# Patient Record
Sex: FEMALE | Race: WHITE | NOT HISPANIC OR LATINO | Employment: OTHER | URBAN - METROPOLITAN AREA
[De-identification: names, ages, dates, MRNs, and addresses within clinical notes are randomized per-mention and may not be internally consistent; named-entity substitution may affect disease eponyms.]

---

## 2017-06-20 ENCOUNTER — HOSPITAL ENCOUNTER (OUTPATIENT)
Dept: MRI IMAGING | Facility: HOSPITAL | Age: 82
Discharge: HOME | End: 2017-06-20
Attending: NEUROLOGICAL SURGERY
Payer: COMMERCIAL

## 2017-06-20 DIAGNOSIS — D49.7 NEOPLASM OF UNSPECIFIED BEHAVIOR OF ENDOCRINE GLANDS AND OTHER PARTS OF NERVOUS SYSTEM: ICD-10-CM

## 2017-06-20 DIAGNOSIS — D32.1 BENIGN NEOPLASM OF SPINAL MENINGES (HCC): ICD-10-CM

## 2017-06-20 PROCEDURE — 72141 MRI NECK SPINE W/O DYE: CPT

## 2017-06-28 ENCOUNTER — TRANSCRIBE ORDERS (OUTPATIENT)
Dept: ADMINISTRATIVE | Facility: HOSPITAL | Age: 82
End: 2017-06-28

## 2017-06-28 ENCOUNTER — ALLSCRIPTS OFFICE VISIT (OUTPATIENT)
Dept: OTHER | Facility: OTHER | Age: 82
End: 2017-06-28

## 2017-06-28 DIAGNOSIS — E24.8 HYPERCORTISOLISM DUE TO ADRENAL NEOPLASM (HCC): Primary | ICD-10-CM

## 2017-06-28 DIAGNOSIS — D32.1 BENIGN NEOPLASM OF SPINAL MENINGES (HCC): ICD-10-CM

## 2017-06-28 DIAGNOSIS — D49.7 HYPERCORTISOLISM DUE TO ADRENAL NEOPLASM (HCC): Primary | ICD-10-CM

## 2017-11-27 ENCOUNTER — GENERIC CONVERSION - ENCOUNTER (OUTPATIENT)
Dept: OTHER | Facility: OTHER | Age: 82
End: 2017-11-27

## 2017-11-27 DIAGNOSIS — Z12.31 ENCOUNTER FOR SCREENING MAMMOGRAM FOR MALIGNANT NEOPLASM OF BREAST: ICD-10-CM

## 2018-01-12 NOTE — PROGRESS NOTES
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)    Plan    · (1) BUN; Status:Active; Requested VVV:[id number];    Perform:[facility]; IST:[id number]; Ordered; For:Intradural extramedullary spinal tumor, Spinal meningioma; Ordered By:Billy Patel;   · (1) CREATININE; Status:Active; Requested YUM:[id number];    Perform:[facility]; JAKE:[id number]; Ordered; For:Intradural extramedullary spinal tumor, Spinal meningioma; Ordered By:Billy Patel;   · * MRI CERVICAL SPINE W WO CONTRAST; Status:Voided;    Perform:[facility]; Order Comments:include down to T5, s/p meningioma resection; in 1 year; Due:28Jun2018; Ordered; For:Intradural extramedullary spinal tumor, Spinal meningioma; Ordered By:Billy Patel;   · Follow-up visit in 1 year Evaluation and Treatment  Follow-up  - at [facility] or as [facility] at  [facility]  Status: Hold For - Scheduling  Requested for: [id number]   Ordered; For: Intradural extramedullary spinal tumor, Spinal meningioma; Ordered By: Vani Denson Performed:  Due: [id number]   · * MRI CERVICAL SPINE WO CONTRAST; Status:Need Information - Financial  Authorization; Requested UVD:[id number];    Perform:[facility]; Order Comments:in 1 year; f/u for meningioma, include down to T5  Patient prefers non-contrast study ; Due:28Jun2018; Ordered; For:Intradural extramedullary spinal tumor, Spinal meningioma; Ordered By:Billy Patel;    Discussion/Summary    Patient doing very well 2 years s/p resection of T1 IDEM WHO 1 meningioma and decompression C5-7 for stenosis, fixation fusion C4-T3  EQ5D5L today 1 000, VAS 98  Had some swallowing difficulty postop, was worked up with CT Chest (negative for PE), V/Q scan (negative for PE), and barium swallow (reflux and profound esophageal dysmotility)  Was started on protonix, and her swallowing issues resolved  Following with her PCP, Dr Paulina Quiros, to manage this issue       Last year had just been diagnosed with left lower extremity DVT  This was after 4 day train trip  Was prescribed his Xeralto by Dr Lucian Silvestre, her PCP  Has since discontinued this medication  As above, neurologically doing very well  6 week and 6 month and 1 year postop Xrays show stability  MRI of CSpine at 1 year and 2 years (both done without contrast) does not show any recurrence (the risk of which I feel is very low)  I have suggested a repeat study be done in one year, which I have ordered  the patient requests this be done without contrast  I feel this is reasonable, if there is any thickening, effacement of the thecal sac, suggestion of recurrence, this should be visible on a noncontrast study, and the patient can be referred for a full contrast study as needed  She weaned out of the collar successfully  She is taking no medications for pain  She will call with any questions or concerns in the meantime  History of Present Illness  The patient is being seen for a routine clinic follow-up of a tumor of the spinal column  The tumor is located in the intradural-extramedullary space, at T 1  The tumor is a meningioma, WHO I  Past evaluation has included spine MRI  Past treatment has included laminectomy and resection  (7/9/15 [facility] assist DR: LAURA C4-T3 decompression/fixation/fusion for both stenosis and T1 WHO 1 meningioma) Symptoms:  occasional tightness in the neck, but no neck pain, no back pain, no upper extremity pain, no lower extremity pain, no localized weakness, no urinary incontinence, no bowel incontinence and no gait disturbance    The patient presents with complaints of sensory changes (" tight" feeling in her toes )  The patient presents with complaints of no urinary retention (does have urgency; no incomplete voiding, no nocturia, no hesitancy) The patient is not currently being treated for this problem        Review of Systems    Constitutional: No fever, no chills, feels well, no tiredness, no recent weight gain or weight loss  Eyes: Hx: right eye blind, but no eye pain, no dryness of the eyes, eyes not red, no purulent discharge from the eyes and no itching of the eyes   The patient presents with complaints of constant episodes of left eye visual disturbances, described as blurry vision  ENT: no complaints of earache, no loss of hearing, no nose bleeds, no nasal discharge, no sore throat, no hoarseness  Cardiovascular: No complaints of slow heart rate, no fast heart rate, no chest pain, no palpitations, no leg claudication, no lower extremity edema  Respiratory: No complaints of shortness of breath, no wheezing, no cough, no SOB on exertion, no orthopnea, no PND  Gastrointestinal: fair appetite, elastic tightness feeling in abdomen resolved, but No complaints of abdominal pain, no constipation, no nausea or vomiting, no diarrhea, no bloody stools  Genitourinary: Urinary urgency resolved, but No complaints of dysuria, no incontinence, no pelvic pain, no dysmenorrhea, no vaginal discharge or bleeding  Musculoskeletal: hx of LLE DVT, but No complaints of arthralgias, no myalgias, no joint swelling or stiffness, no limb pain or swelling  Integumentary: incision healing well, but No complaints of skin rash or lesions, no itching, no skin wounds, no breast pain or lump  Neurological: "tightness feeling in the toes", but No complaints of headache, no confusion, no convulsions, no numbness, no dizziness or fainting, no tingling, no limb weakness, no difficulty walking  Psychiatric: sleep disturbances and depression, but not suicidal, no anxiety, no personality change and no emotional problems  Endocrine: thyroidectomy multi nodular goiter, but No complaints of proptosis, no hot flashes, no muscle weakness, no deepening of the voice, no feelings of weakness     Hematologic/Lymphatic: anemia s/p surger, but No complaints of swollen glands, no swollen glands in the neck, does not bleed easily, does not bruise easily  Active Problems    1  Encounter for screening mammogram for breast cancer (V76 12) (Z12 31)   2  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)   3  Spinal cord tumor (239 7) (D49 7)   4  Spinal meningioma (225 4) (D32 1)    Past Medical History    1  History of Abnormal MRI, thoracic spine (793 7) (R93 7)   2  History of Coordination problem (781 3) (R27 9)   3  History of Gait difficulty (781 2) (R26 9)   4  History of anemia (V12 3) (Z86 2)   5  History of cataract (V12 49) (Z86 69)   6  History of endometriosis (V13 29) (Z87 42)   7  History of hearing loss (V12 49) (Z86 69)   8  History of thyroid disease (V12 29) (Z86 39)   9  History of Lower extremity numbness (782 0) (R20 0)   10  History of Poor balance (781 99) (R26 89)   11  History of Postoperative visit (V58 49) (Z48 89)   12  History of Wears glasses (V49 89) (Z97 3)    Surgical History    1  History of Appendectomy   2  History of Back Surgery   3  History of Breast Surgery Puncture Aspiration Of Cyst   4  History of Enteroscopic Polypectomy   5  History of Gallbladder Surgery   6  History of Hernia Repair   7  History of Leg Repair    Family History  Mother    1  Family history of   Father    2  Family history of   Maternal Grandmother    3  Family history of   Paternal Grandmother    3  Family history of   Maternal Grandfather    5  Family history of   Paternal Grandfather    10  Family history of     Social History    · Denied: History of Alcohol use   · Currently sexually active   · Denied: History of Drug use   · Four children   · Never a smoker   · No known STD risk factors   · Retired   ·     Current Meds   1  Protonix 40 MG Oral Tablet Delayed Release; TAKE 1 TABLET DAILY; Therapy: (Dennis Plasencia) to Recorded   2  Vitamin B-12 SOLN; injections occasional;   Therapy: (Recorded:2016) to Recorded    Allergies    1  Betadine MISC   2  Penicillins    Vitals  Vital Signs    Recorded: 28Jun2017 01:10PM   Temperature 98 1 F, Oral   Heart Rate 80   Respiration 16   Systolic 124, RUE, Sitting   Diastolic 66, RUE, Sitting   Height 5 ft 1 in   Weight 135 lb    BMI Calculated 25 51   BSA Calculated 1 6   Pain Scale 0     Physical Exam     Constitutional Patient appears healthy and well developed  No signs of acute distress present  Head and Face Normal on inspection  Neck posterior cervical incision well healed, minimal atrophy  Respiratory Respiratory effort: Normal    Neurologic - Mental Status: Alert and Oriented x3  Mood and affect: Affect is normal   Attention is WNL  Santos Merle Speech is articulated and fluent  Grossly nonfocal     Cranial Nerve Exam:  7th cranial nerve: Face symmetrical at grimace and at rest   Motor System General Motor Strength: No pronator drift and no parietal drift  Motor Strength:  strength was normal in both upper extremities  strength was normal in both lower extremities  Strength examination:  Reflexes: Biceps reflexes are 2+ bilaterally  Triceps reflexes are 2+ bilaterally  Achilles reflexes are 2+ bilaterally  Babinski's reflex is 2+ down going bilaterally  Ankle clonus is absent bilaterally  Diaz sign was not present:   Coordination: Involuntary movements: None   Gait and Station: Pillow with a normal gait  no aids  Results/Data  * MRI CERVICAL SPINE WO CONTRAST 20Jun2017 12:28PM Asif Pugh Order Number: [id number]   Performing Comments: s/p T1 meningioma resection; perform in one year  Patient prefers/requests noncontrast study  over down to T5    - Patient Instructions: To schedule this appointment, please contact Central Scheduling at [phone]  SCHEDULED [address] SIERRA 6/20/17 @@ 1:00PM  PLEASE ARRIVE 15 MINUTES EARLY  SG10     Test Name Result Flag Reference   MRI CERVICAL SPINE WO CONTRAST (Report)     This is a summary report   The complete report is available in the patient's medical record  If you cannot access the medical record, please contact the sending organization for a detailed fax or copy  MRI CERVICAL SPINE WITHOUT CONTRAST     INDICATION: D32 1: Benign neoplasm of spinal meninges   D49 7: Neoplasm of unspecified behavior of endocrine glands and other parts of nervous system   D49 7: Neoplasm of unspecified behavior of endocrine glands and other parts of nervous system  History taken directly from the electronic ordering system  COMPARISON: MRI performed on 6/24/2015, 6/23/2016  TECHNIQUE: Sagittal T1, sagittal T2, sagittal inversion recovery, axial T2, axial 2D merge        IMAGE QUALITY: Diagnostic     FINDINGS:     ALIGNMENT: Reversal of the normal cervical lordosis  Changes of dorsal decompression extending from C6 to T2 noted  Changes of posterior fusion noted extending from C4 to the level of T3  There is stable soft tissue thickening within the operative bed but unchanged from prior examination  No evidence    for an intra-dural mass is similar characteristics of a meningioma  MARROW SIGNAL: Marrow signal is within normal limits without signs of an infiltrative process  No signs of acute fracture or significant marrow edema pattern  Vertebral body heights are maintained  CERVICAL AND VISUALIZED THORACIC CORD: Normal signal within the visualized cord  PREVERTEBRAL AND PARASPINAL SOFT TISSUES: Enlarged left thyroid gland in keeping with multinodular goiter  VISUALIZED POSTERIOR FOSSA: The visualized posterior fossa demonstrates no abnormal signal      CERVICAL DISC SPACES:        C2-C3: No significant spinal canal stenosis  No right and no left neural foraminal stenosis  C3-C4: Disc osteophyte complex, uncovertebral hypertrophy, and facet arthropathy  Mild spinal canal stenosis  No cord deformity  Mild right and moderate left neural foraminal stenosis  C4-C5: Disc osteophyte complex, uncovertebral hypertrophy, and facet arthropathy   No significant spinal canal stenosis  Mild right and no significant left neural foraminal stenosis  C5-C6: Changes dorsal decompression  Disc osteophyte complex, uncovertebral hypertrophy, and facet arthropathy  Mild to moderate right and moderate left neural foraminal stenosis  C6-C7: Changes dorsal decompression  Bilateral facet arthropathy  Mild right and no significant left neural foraminal stenosis  C7-T1: Changes of dorsal decompression to No significant spinal canal stenosis  No right and no left neural foraminal stenosis  UPPER THORACIC DISC SPACES: No evidence for osseous canal or foraminal stenosis       IMPRESSION:   Stable MRI when compared to 6/23/2016  Sequela of remote decompression and posterior fusion  No clear evidence for recurrence  No cord signal abnormality  Stable spondylosis when compared to 6/23/2016         Workstation performed: [id number]     Signed by:   Quincy Bautista MD   6/21/17     Signatures   Electronically signed by : Reina Zimmer MD PhD; Jun 28 2017  1:46PM EST                       (Author)

## 2018-01-13 VITALS
SYSTOLIC BLOOD PRESSURE: 140 MMHG | WEIGHT: 135 LBS | BODY MASS INDEX: 25.49 KG/M2 | DIASTOLIC BLOOD PRESSURE: 66 MMHG | TEMPERATURE: 98.1 F | HEIGHT: 61 IN | HEART RATE: 80 BPM | RESPIRATION RATE: 16 BRPM

## 2018-01-16 NOTE — MISCELLANEOUS
Message   Recorded as Task   Date: 01/08/2016 04:28 PM, Created By: Jessee Valadez   Task Name: Miscellaneous   Assigned To: Ame Monson   Regarding Patient: Mikayla Agosto, Status: Active   CommentDarnelle Riddhi - 08 Jan 2016 4:28 PM     TASK CREATED  Cinthya Gambino is 6 mo s/p C4-T3 fixation fusion  Last seen 12/30 for f/u  Are there any contraindications to her having a massage  Terry Veras June - 11 Jan 2016 9:36 AM     TASK REPLIED TO: Previously Assigned To Cristobal Patel  presumably of cervical area? I would hold off on that until at least 6 weeks postop   Costenbader,Rondel - 12 Jan 2016 11:55 AM     TASK EDITED  Pt had surgery on 7/9/2015  Pt is past 6 weeks postop  Pt is 6 months postop and was informed she may have massage was advised to start out gently    Pt stated an understanding        Signatures   Electronically signed by : Ariana Wheeler, ; Jan 12 2016 11:55AM EST                       (Author)

## 2018-01-22 VITALS
WEIGHT: 134 LBS | BODY MASS INDEX: 25.3 KG/M2 | DIASTOLIC BLOOD PRESSURE: 64 MMHG | SYSTOLIC BLOOD PRESSURE: 104 MMHG | HEIGHT: 61 IN

## 2018-01-26 ENCOUNTER — TELEPHONE (OUTPATIENT)
Dept: NEUROSURGERY | Facility: CLINIC | Age: 83
End: 2018-01-26

## 2018-01-26 NOTE — TELEPHONE ENCOUNTER
As we discussed, no need for patient to come in early unless she has new neck pain, arm or leg weakness  Otherwise can follow-up as planned with studies as planned    Thanks

## 2018-01-26 NOTE — TELEPHONE ENCOUNTER
Called patient back  Patient reported she had a recent fall where she passed out in the shower  +LOC  911 called and patient sent to ED at [facility]  CT scan negative  Patient received fluids for dehydration and then sent home  ED doctor recommended for patient to call neurosurgery to provide update  Patient also developed shingles  About two and a half years ago, she is s/p T1 IDEM WHO grade I meningioma and decompression C5-7 for stenosis, fixation and fusion C4-T3  Patient is seen at [facility]  Her next appointment is 6/27/18 for a f/u with MRI C-spine w/o contrast      Should I move her appointment up with the MRI at [facility]? Patient does not report any pain or back issues  Thank you

## 2018-01-30 NOTE — TELEPHONE ENCOUNTER
Called the patient back to inquire if she has any new neck pain or arm/leg weakness  Patient denies any new neck pain or arm/leg weakness  Instructed patient to follow-up as originally planned  Patient was satisfied with the call

## 2018-06-22 ENCOUNTER — HOSPITAL ENCOUNTER (OUTPATIENT)
Dept: MRI IMAGING | Facility: HOSPITAL | Age: 83
Discharge: HOME | End: 2018-06-22
Attending: NEUROLOGICAL SURGERY
Payer: COMMERCIAL

## 2018-06-22 DIAGNOSIS — E24.8 HYPERCORTISOLISM DUE TO ADRENAL NEOPLASM (HCC): ICD-10-CM

## 2018-06-22 DIAGNOSIS — D49.7 HYPERCORTISOLISM DUE TO ADRENAL NEOPLASM (HCC): ICD-10-CM

## 2018-06-22 DIAGNOSIS — D32.1 BENIGN NEOPLASM OF SPINAL MENINGES (HCC): ICD-10-CM

## 2018-06-22 PROCEDURE — 72141 MRI NECK SPINE W/O DYE: CPT

## 2018-06-27 ENCOUNTER — OFFICE VISIT (OUTPATIENT)
Dept: NEUROSURGERY | Facility: CLINIC | Age: 83
End: 2018-06-27
Payer: COMMERCIAL

## 2018-06-27 VITALS
RESPIRATION RATE: 16 BRPM | HEART RATE: 69 BPM | DIASTOLIC BLOOD PRESSURE: 66 MMHG | WEIGHT: 122.2 LBS | TEMPERATURE: 97.9 F | SYSTOLIC BLOOD PRESSURE: 139 MMHG | HEIGHT: 61 IN | BODY MASS INDEX: 23.07 KG/M2

## 2018-06-27 DIAGNOSIS — D32.1 SPINAL MENINGIOMA (HCC): Primary | ICD-10-CM

## 2018-06-27 PROCEDURE — 99214 OFFICE O/P EST MOD 30 MIN: CPT | Performed by: NEUROLOGICAL SURGERY

## 2018-06-27 RX ORDER — PANTOPRAZOLE SODIUM 40 MG/1
40 TABLET, DELAYED RELEASE ORAL EVERY MORNING
COMMUNITY
End: 2020-06-24

## 2018-06-27 NOTE — PROGRESS NOTES
Neurosurgery Office Note  Armando Ocampo 81 y o  female MRN: [id number]      Assessment/Plan      Diagnoses and all orders for this visit:    Spinal meningioma [facility])  -     MRI cervical spine without contrast; Future    Other orders  -     pantoprazole (PROTONIX) 40 mg tablet; Take 40 mg by mouth daily          Discussion: This 81year old woman is now 0 3 years status post her C7-T2 laminectomies for resection of her spinal meningioma  Lesion was who grade 1  Required instrumentation from C4-T3, and had decompressions C5-C7 for stenosis  Is doing very well  Quite active  Metrics:  EQ5D5L 1 000, VA S 95, ECOG 0,  , ambulates without aids  Neurologically quite remarkable  Still Able travel to see her son in [place]  In fact, has plans to travel to [place] soon  Follow-up MRI scan of the cervical spine shows no recurrence, and good residual decompression  Alignment remains good  Incision well healed, with no skin thinning, protrusion of bone or hardware etc     She did have recent health issues, returning from a trip to [place], contracted the flu, and then shingles, and then a UTI, landing her in the hospital 3 times and relatively short spell  She has recovered for these well  I have suggested continued surveillance of her spinal meningioma  I suggest repeat MRI scan in 12 months, adapting NCCN guidelines cranial meningiomas  She is in agreement  I have ordered study  I will see her back in 1 year  CHIEF COMPLAINT    Chief Complaint   Patient presents with    Follow-up     1 year f/u with new MRI C-spine       HISTORY    History of Present Illness     81y o  year old female     HPI    See Discussion    REVIEW OF SYSTEMS    Review of Systems   Constitutional: Negative  HENT: Negative  Eyes:        Right eye blindness   Respiratory: Negative  Cardiovascular: Negative  Gastrointestinal: Positive for constipation (not new)  Endocrine: Negative      Genitourinary: Negative  Musculoskeletal: Positive for neck stiffness  Skin: Negative  Allergic/Immunologic: Negative  Neurological: Negative  Hematological: Negative  Psychiatric/Behavioral: Positive for sleep disturbance  Becoming more forgetful at times         Meds/Allergies     Current Outpatient Prescriptions   Medication Sig Dispense Refill    pantoprazole (PROTONIX) 40 mg tablet Take 40 mg by mouth daily       No current facility-administered medications for this visit  Allergies   Allergen Reactions    Penicillins        PAST HISTORY    Past Medical History:   Diagnosis Date    Intradural extramedullary spinal tumor     Spinal meningioma ([address] )        Past Surgical History:   Procedure Laterality Date    APPENDECTOMY      BACK SURGERY      Posterior cervical thoracic surgery with fusion    BREAST SURGERY      GALLBLADDER SURGERY      HERNIA REPAIR      POSTERIOR CERVICAL LAMINECTOMY      THYROID SURGERY      VARICOSE VEIN SURGERY         Social History   Substance Use Topics    Smoking status: Never Smoker    Smokeless tobacco: Never Used    Alcohol use Yes      Comment: occasional       Family History   Problem Relation Age of Onset    Cancer Mother     Alzheimer's disease Mother     Heart disease Father     Heart disease Brother          Above history personally reviewed  EXAM    Vitals:Blood pressure 139/66, pulse 69, temperature 97 9 °F (36 6 °C), temperature source Tympanic, resp  rate 16, height 5' 1" (1 549 m), weight 55 4 kg (122 lb 3 2 oz)  ,Body mass index is 23 09 kg/m²  Physical Exam   Constitutional: She is oriented to person, place, and time  She appears well-developed and well-nourished  HENT:   Head: Normocephalic  Eyes: No scleral icterus  Neck: Neck supple  Cardiovascular: Normal rate  Pulmonary/Chest: Effort normal    Abdominal: Soft  Neurological: She is alert and oriented to person, place, and time  She has normal strength   GCS eye subscore is 4  GCS verbal subscore is 5  GCS motor subscore is 6  Skin: Skin is warm and dry  Psychiatric: She has a normal mood and affect  Her speech is normal and behavior is normal        Neurologic Exam     Mental Status   Oriented to person, place, and time  Attention: normal    Speech: speech is normal   Level of consciousness: alert    Cranial Nerves     CN VII   Facial expression full, symmetric  Motor Exam   Muscle bulk: normal  Overall muscle tone: normal    Strength   Strength 5/5 throughout  Moves all extremities, grossly normal     Gait, Coordination, and Reflexes     Tremor   Resting tremor: absent  Intention tremor: absent  Action tremor: absent  No aids  MEDICAL DECISION MAKING    Imaging Studies:     Mri Cervical Spine Wo Contrast    Result Date: 6/25/2018  Narrative: MRI CERVICAL SPINE WITHOUT CONTRAST INDICATION: D32 1: Benign neoplasm of spinal meninges  Follow-up tumor resection  COMPARISON:  6/20/2017 TECHNIQUE:  Sagittal T1, sagittal T2, sagittal inversion recovery, axial T2, axial  2D merge IMAGE QUALITY:  Diagnostic FINDINGS: ALIGNMENT:  Stable alignment of the cervical spine  No subluxation  No compression fracture or scoliosis  Patient has undergone previous posterior decompression and fusion  Posterior decompression in the lower cervical and upper thoracic spine with posterior fusion including hardware from C4 into the upper thoracic spine with articulating screws in the cervical spine and pedicle screws in the thoracic spine  Bilateral spinal rods  MARROW SIGNAL:  Normal marrow signal is identified within the visualized bony structures  No discrete marrow lesion  CERVICAL AND VISUALIZED THORACIC CORD:  Normal signal within the visualized cord  PREVERTEBRAL AND PARASPINAL SOFT TISSUES:  The left lobe of the thyroid gland is diffusely heterogeneous and enlarged, similar to the prior examination    The largest nodule is posteriorly located measuring greater than 2 cm similar to the prior exam   These changes result in mild mass effect upon the trachea  VISUALIZED POSTERIOR FOSSA:  The visualized posterior fossa demonstrates no abnormal signal  CERVICAL DISC SPACES: C2-C3:  Normal  C3-C4:  Mild annular bulging  Mild posterior element hypertrophic change, left greater than right  Mild canal stenosis without cord compression  Only slight foraminal narrowing  C4-C5:  Loss of disc height  Slight endplate hypertrophic change  No canal stenosis or foraminal narrowing  C5-C6:  Loss of disc height  Minimal endplate hypertrophic change without canal stenosis or significant foraminal narrowing  C6-C7:  Normal disc height and signal   Adequate posterior decompression  No canal stenosis or foraminal narrowing  C7-T1:  Normal disc height and signal with adequate posterior decompression and no canal stenosis or foraminal narrowing  UPPER THORACIC DISC SPACES:  Adequate posterior decompression within the upper thoracic spine  No disc herniation, canal stenosis or foraminal narrowing  Impression: Stable examination of the cervical spine  Mild canal stenosis at the C3-4 level secondary to mild annular bulging and slight posterior element hypertrophic change  No cord compression in only slight foraminal narrowing  Within the lower cervical and upper thoracic spine there is adequate posterior decompression without canal stenosis or foraminal narrowing  No evidence of residual or recurrent mass on this noncontrast examination  Incidental thyroid nodule(s) for which nonemergent thyroid ultrasound is recommended  Workstation performed: [id number]       I have personally reviewed pertinent reports     and I have personally reviewed pertinent films in PACS

## 2019-04-30 ENCOUNTER — OFFICE VISIT (OUTPATIENT)
Dept: OBGYN CLINIC | Facility: CLINIC | Age: 84
End: 2019-04-30
Payer: COMMERCIAL

## 2019-04-30 VITALS
WEIGHT: 135 LBS | HEIGHT: 61 IN | SYSTOLIC BLOOD PRESSURE: 128 MMHG | BODY MASS INDEX: 25.49 KG/M2 | DIASTOLIC BLOOD PRESSURE: 66 MMHG

## 2019-04-30 DIAGNOSIS — R14.0 BLOATING: Primary | ICD-10-CM

## 2019-04-30 DIAGNOSIS — Z12.39 SCREENING FOR MALIGNANT NEOPLASM OF BREAST: ICD-10-CM

## 2019-04-30 PROCEDURE — 99213 OFFICE O/P EST LOW 20 MIN: CPT | Performed by: OBSTETRICS & GYNECOLOGY

## 2019-05-09 ENCOUNTER — TRANSCRIBE ORDERS (OUTPATIENT)
Dept: LAB | Facility: CLINIC | Age: 84
End: 2019-05-09

## 2019-05-09 ENCOUNTER — APPOINTMENT (OUTPATIENT)
Dept: LAB | Facility: CLINIC | Age: 84
End: 2019-05-09
Payer: COMMERCIAL

## 2019-05-09 ENCOUNTER — HOSPITAL ENCOUNTER (OUTPATIENT)
Dept: ULTRASOUND IMAGING | Facility: HOSPITAL | Age: 84
Discharge: HOME | End: 2019-05-09
Attending: OBSTETRICS & GYNECOLOGY
Payer: COMMERCIAL

## 2019-05-09 DIAGNOSIS — R14.0 BLOATING: ICD-10-CM

## 2019-05-09 DIAGNOSIS — E03.9 HYPOTHYROIDISM, ACQUIRED: ICD-10-CM

## 2019-05-09 DIAGNOSIS — N39.0 URINARY TRACT INFECTION WITHOUT HEMATURIA, SITE UNSPECIFIED: ICD-10-CM

## 2019-05-09 DIAGNOSIS — E78.00 PURE HYPERCHOLESTEROLEMIA: ICD-10-CM

## 2019-05-09 DIAGNOSIS — D64.9 ANEMIA, UNSPECIFIED TYPE: ICD-10-CM

## 2019-05-09 DIAGNOSIS — N39.0 URINARY TRACT INFECTION WITHOUT HEMATURIA, SITE UNSPECIFIED: Primary | ICD-10-CM

## 2019-05-09 LAB
ALBUMIN SERPL BCP-MCNC: 3.6 G/DL (ref 3.5–5)
ALP SERPL-CCNC: 106 U/L (ref 46–116)
ALT SERPL W P-5'-P-CCNC: 21 U/L (ref 12–78)
ANION GAP SERPL CALCULATED.3IONS-SCNC: 11 MMOL/L (ref 4–13)
AST SERPL W P-5'-P-CCNC: 25 U/L (ref 5–45)
BACTERIA UR QL AUTO: ABNORMAL /HPF
BILIRUB SERPL-MCNC: 0.7 MG/DL (ref 0.2–1)
BILIRUB UR QL STRIP: NEGATIVE
BUN SERPL-MCNC: 20 MG/DL (ref 5–25)
CALCIUM SERPL-MCNC: 8.9 MG/DL (ref 8.3–10.1)
CHLORIDE SERPL-SCNC: 103 MMOL/L (ref 100–108)
CHOLEST SERPL-MCNC: 246 MG/DL (ref 50–200)
CLARITY UR: CLEAR
CO2 SERPL-SCNC: 26 MMOL/L (ref 21–32)
COLOR UR: YELLOW
CREAT SERPL-MCNC: 0.99 MG/DL (ref 0.6–1.3)
ERYTHROCYTE [DISTWIDTH] IN BLOOD BY AUTOMATED COUNT: 13.4 % (ref 11.6–15.1)
GFR SERPL CREATININE-BSD FRML MDRD: 53 ML/MIN/1.73SQ M
GLUCOSE P FAST SERPL-MCNC: 96 MG/DL (ref 65–99)
GLUCOSE UR STRIP-MCNC: NEGATIVE MG/DL
HCT VFR BLD AUTO: 41.1 % (ref 34.8–46.1)
HDLC SERPL-MCNC: 85 MG/DL (ref 40–60)
HGB BLD-MCNC: 13.7 G/DL (ref 11.5–15.4)
HGB UR QL STRIP.AUTO: ABNORMAL
KETONES UR STRIP-MCNC: NEGATIVE MG/DL
LDLC SERPL CALC-MCNC: 147 MG/DL (ref 0–100)
LDLC SERPL DIRECT ASSAY-MCNC: 151 MG/DL (ref 0–100)
LEUKOCYTE ESTERASE UR QL STRIP: NEGATIVE
MCH RBC QN AUTO: 29.5 PG (ref 26.8–34.3)
MCHC RBC AUTO-ENTMCNC: 33.3 G/DL (ref 31.4–37.4)
MCV RBC AUTO: 89 FL (ref 82–98)
NITRITE UR QL STRIP: NEGATIVE
NON-SQ EPI CELLS URNS QL MICRO: ABNORMAL /HPF
NONHDLC SERPL-MCNC: 161 MG/DL
PH UR STRIP.AUTO: 6.5 [PH]
PLATELET # BLD AUTO: 252 THOUSANDS/UL (ref 149–390)
PMV BLD AUTO: 10.9 FL (ref 8.9–12.7)
POTASSIUM SERPL-SCNC: 4.6 MMOL/L (ref 3.5–5.3)
PROT SERPL-MCNC: 7.5 G/DL (ref 6.4–8.2)
PROT UR STRIP-MCNC: NEGATIVE MG/DL
RBC # BLD AUTO: 4.64 MILLION/UL (ref 3.81–5.12)
RBC #/AREA URNS AUTO: ABNORMAL /HPF
SODIUM SERPL-SCNC: 140 MMOL/L (ref 136–145)
SP GR UR STRIP.AUTO: 1.01 (ref 1–1.03)
TRIGL SERPL-MCNC: 68 MG/DL
TSH SERPL DL<=0.05 MIU/L-ACNC: 1.78 UIU/ML (ref 0.36–3.74)
UROBILINOGEN UR QL STRIP.AUTO: 1 E.U./DL
WBC # BLD AUTO: 5.19 THOUSAND/UL (ref 4.31–10.16)
WBC #/AREA URNS AUTO: ABNORMAL /HPF

## 2019-05-09 PROCEDURE — 76830 TRANSVAGINAL US NON-OB: CPT

## 2019-05-09 PROCEDURE — 83721 ASSAY OF BLOOD LIPOPROTEIN: CPT

## 2019-05-09 PROCEDURE — 76700 US EXAM ABDOM COMPLETE: CPT

## 2019-05-09 PROCEDURE — 80061 LIPID PANEL: CPT

## 2019-05-09 PROCEDURE — 81001 URINALYSIS AUTO W/SCOPE: CPT | Performed by: INTERNAL MEDICINE

## 2019-05-09 PROCEDURE — 80053 COMPREHEN METABOLIC PANEL: CPT

## 2019-05-09 PROCEDURE — 36415 COLL VENOUS BLD VENIPUNCTURE: CPT

## 2019-05-09 PROCEDURE — 85027 COMPLETE CBC AUTOMATED: CPT

## 2019-05-09 PROCEDURE — 84443 ASSAY THYROID STIM HORMONE: CPT

## 2019-05-09 PROCEDURE — 76856 US EXAM PELVIC COMPLETE: CPT

## 2019-06-21 ENCOUNTER — HOSPITAL ENCOUNTER (OUTPATIENT)
Dept: MRI IMAGING | Facility: HOSPITAL | Age: 84
Discharge: HOME | End: 2019-06-21
Attending: NEUROLOGICAL SURGERY
Payer: COMMERCIAL

## 2019-06-21 DIAGNOSIS — D32.1 SPINAL MENINGIOMA (HCC): ICD-10-CM

## 2019-06-21 PROCEDURE — 72141 MRI NECK SPINE W/O DYE: CPT

## 2019-06-23 ENCOUNTER — ANESTHESIA EVENT (OUTPATIENT)
Dept: PERIOP | Facility: AMBULARY SURGERY CENTER | Age: 84
End: 2019-06-23
Payer: COMMERCIAL

## 2019-06-24 ENCOUNTER — ANESTHESIA (OUTPATIENT)
Dept: PERIOP | Facility: AMBULARY SURGERY CENTER | Age: 84
End: 2019-06-24
Payer: COMMERCIAL

## 2019-06-24 ENCOUNTER — HOSPITAL ENCOUNTER (OUTPATIENT)
Facility: AMBULARY SURGERY CENTER | Age: 84
Setting detail: OUTPATIENT SURGERY
Discharge: HOME | End: 2019-06-24
Attending: OPHTHALMOLOGY | Admitting: OPHTHALMOLOGY
Payer: COMMERCIAL

## 2019-06-24 VITALS
SYSTOLIC BLOOD PRESSURE: 175 MMHG | OXYGEN SATURATION: 97 % | RESPIRATION RATE: 16 BRPM | TEMPERATURE: 98.5 F | DIASTOLIC BLOOD PRESSURE: 75 MMHG | BODY MASS INDEX: 25.3 KG/M2 | HEART RATE: 87 BPM | WEIGHT: 134 LBS | HEIGHT: 61 IN

## 2019-06-24 DIAGNOSIS — H25.12 AGE-RELATED NUCLEAR CATARACT OF LEFT EYE: Primary | ICD-10-CM

## 2019-06-24 PROCEDURE — V2632 POST CHMBR INTRAOCULAR LENS: HCPCS | Performed by: OPHTHALMOLOGY

## 2019-06-24 DEVICE — IOL SN60WF 14.5: Type: IMPLANTABLE DEVICE | Site: EYE | Status: FUNCTIONAL

## 2019-06-24 RX ORDER — TETRACAINE HYDROCHLORIDE 5 MG/ML
1 SOLUTION OPHTHALMIC ONCE
Status: COMPLETED | OUTPATIENT
Start: 2019-06-24 | End: 2019-06-24

## 2019-06-24 RX ORDER — CYCLOPENTOLATE HYDROCHLORIDE 10 MG/ML
1 SOLUTION/ DROPS OPHTHALMIC
Status: COMPLETED | OUTPATIENT
Start: 2019-06-24 | End: 2019-06-24

## 2019-06-24 RX ORDER — GATIFLOXACIN 5 MG/ML
SOLUTION/ DROPS OPHTHALMIC AS NEEDED
Status: DISCONTINUED | OUTPATIENT
Start: 2019-06-24 | End: 2019-06-24 | Stop reason: HOSPADM

## 2019-06-24 RX ORDER — LIDOCAINE HYDROCHLORIDE 10 MG/ML
INJECTION, SOLUTION EPIDURAL; INFILTRATION; INTRACAUDAL; PERINEURAL AS NEEDED
Status: DISCONTINUED | OUTPATIENT
Start: 2019-06-24 | End: 2019-06-24 | Stop reason: HOSPADM

## 2019-06-24 RX ORDER — BALANCED SALT SOLUTION 6.4; .75; .48; .3; 3.9; 1.7 MG/ML; MG/ML; MG/ML; MG/ML; MG/ML; MG/ML
SOLUTION OPHTHALMIC AS NEEDED
Status: DISCONTINUED | OUTPATIENT
Start: 2019-06-24 | End: 2019-06-24 | Stop reason: HOSPADM

## 2019-06-24 RX ORDER — LIDOCAINE HYDROCHLORIDE 20 MG/ML
1 JELLY TOPICAL
Status: COMPLETED | OUTPATIENT
Start: 2019-06-24 | End: 2019-06-24

## 2019-06-24 RX ORDER — MIDAZOLAM HYDROCHLORIDE 1 MG/ML
INJECTION INTRAMUSCULAR; INTRAVENOUS AS NEEDED
Status: DISCONTINUED | OUTPATIENT
Start: 2019-06-24 | End: 2019-06-24 | Stop reason: SURG

## 2019-06-24 RX ORDER — KETOROLAC TROMETHAMINE 5 MG/ML
1 SOLUTION OPHTHALMIC
Status: COMPLETED | OUTPATIENT
Start: 2019-06-24 | End: 2019-06-24

## 2019-06-24 RX ORDER — PHENYLEPHRINE HCL 2.5 %
1 DROPS OPHTHALMIC (EYE)
Status: COMPLETED | OUTPATIENT
Start: 2019-06-24 | End: 2019-06-24

## 2019-06-24 RX ORDER — GATIFLOXACIN 5 MG/ML
1 SOLUTION/ DROPS OPHTHALMIC 2 TIMES DAILY
Qty: 3 ML | Refills: 0
Start: 2019-06-24 | End: 2020-06-24

## 2019-06-24 RX ORDER — TETRACAINE HYDROCHLORIDE 5 MG/ML
SOLUTION OPHTHALMIC AS NEEDED
Status: DISCONTINUED | OUTPATIENT
Start: 2019-06-24 | End: 2019-06-24 | Stop reason: HOSPADM

## 2019-06-24 RX ADMIN — KETOROLAC TROMETHAMINE 1 DROP: 5 SOLUTION/ DROPS OPHTHALMIC at 09:56

## 2019-06-24 RX ADMIN — PHENYLEPHRINE HYDROCHLORIDE 1 DROP: 25 SOLUTION/ DROPS OPHTHALMIC at 09:10

## 2019-06-24 RX ADMIN — PHENYLEPHRINE HYDROCHLORIDE 1 DROP: 25 SOLUTION/ DROPS OPHTHALMIC at 09:25

## 2019-06-24 RX ADMIN — CYCLOPENTOLATE HYDROCHLORIDE 1 DROP: 10 SOLUTION OPHTHALMIC at 09:56

## 2019-06-24 RX ADMIN — CYCLOPENTOLATE HYDROCHLORIDE 1 DROP: 10 SOLUTION OPHTHALMIC at 09:25

## 2019-06-24 RX ADMIN — KETOROLAC TROMETHAMINE 1 DROP: 5 SOLUTION/ DROPS OPHTHALMIC at 09:10

## 2019-06-24 RX ADMIN — LIDOCAINE HYDROCHLORIDE 5 APPLICATION: 20 JELLY TOPICAL at 09:56

## 2019-06-24 RX ADMIN — TETRACAINE HYDROCHLORIDE 1 DROP: 5 SOLUTION OPHTHALMIC at 09:10

## 2019-06-24 RX ADMIN — PHENYLEPHRINE HYDROCHLORIDE 1 DROP: 25 SOLUTION/ DROPS OPHTHALMIC at 09:56

## 2019-06-24 RX ADMIN — LIDOCAINE HYDROCHLORIDE 5 APPLICATION: 20 JELLY TOPICAL at 09:41

## 2019-06-24 RX ADMIN — LIDOCAINE HYDROCHLORIDE 5 APPLICATION: 20 JELLY TOPICAL at 09:10

## 2019-06-24 RX ADMIN — PHENYLEPHRINE HYDROCHLORIDE 1 DROP: 25 SOLUTION/ DROPS OPHTHALMIC at 09:41

## 2019-06-24 RX ADMIN — KETOROLAC TROMETHAMINE 1 DROP: 5 SOLUTION/ DROPS OPHTHALMIC at 09:40

## 2019-06-24 RX ADMIN — MIDAZOLAM HYDROCHLORIDE 2 MG: 1 INJECTION, SOLUTION INTRAMUSCULAR; INTRAVENOUS at 10:08

## 2019-06-24 RX ADMIN — CYCLOPENTOLATE HYDROCHLORIDE 1 DROP: 10 SOLUTION OPHTHALMIC at 09:10

## 2019-06-24 RX ADMIN — LIDOCAINE HYDROCHLORIDE 5 APPLICATION: 20 JELLY TOPICAL at 09:25

## 2019-06-24 RX ADMIN — CYCLOPENTOLATE HYDROCHLORIDE 1 DROP: 10 SOLUTION OPHTHALMIC at 09:40

## 2019-06-24 RX ADMIN — KETOROLAC TROMETHAMINE 1 DROP: 5 SOLUTION/ DROPS OPHTHALMIC at 09:25

## 2019-06-26 ENCOUNTER — OFFICE VISIT (OUTPATIENT)
Dept: NEUROSURGERY | Facility: CLINIC | Age: 84
End: 2019-06-26
Payer: COMMERCIAL

## 2019-06-26 VITALS
BODY MASS INDEX: 25.37 KG/M2 | RESPIRATION RATE: 16 BRPM | TEMPERATURE: 98.6 F | HEIGHT: 61 IN | HEART RATE: 84 BPM | SYSTOLIC BLOOD PRESSURE: 133 MMHG | DIASTOLIC BLOOD PRESSURE: 95 MMHG | WEIGHT: 134.4 LBS

## 2019-06-26 DIAGNOSIS — D32.1 SPINAL MENINGIOMA (HCC): Primary | ICD-10-CM

## 2019-06-26 DIAGNOSIS — Z98.1 S/P SPINAL FUSION: ICD-10-CM

## 2019-06-26 DIAGNOSIS — IMO0002 ADJACENT SEGMENT DISEASE WITH SPINAL STENOSIS: ICD-10-CM

## 2019-06-26 PROCEDURE — 99214 OFFICE O/P EST MOD 30 MIN: CPT | Performed by: NEUROLOGICAL SURGERY

## 2019-06-26 RX ORDER — PREDNISOLONE ACETATE 10 MG/ML
1 SUSPENSION/ DROPS OPHTHALMIC 4 TIMES DAILY
COMMUNITY
Start: 2019-06-16 | End: 2020-06-24

## 2019-10-07 ENCOUNTER — HOSPITAL ENCOUNTER (OUTPATIENT)
Dept: MAMMOGRAPHY | Facility: HOSPITAL | Age: 84
Discharge: HOME | End: 2019-10-07
Attending: OBSTETRICS & GYNECOLOGY
Payer: COMMERCIAL

## 2019-10-07 VITALS — HEIGHT: 61 IN | WEIGHT: 134 LBS | BODY MASS INDEX: 25.3 KG/M2

## 2019-10-07 DIAGNOSIS — Z12.39 SCREENING FOR MALIGNANT NEOPLASM OF BREAST: ICD-10-CM

## 2019-10-07 PROCEDURE — 77067 SCR MAMMO BI INCL CAD: CPT

## 2019-11-14 ENCOUNTER — APPOINTMENT (OUTPATIENT)
Dept: LAB | Facility: CLINIC | Age: 84
End: 2019-11-14
Payer: COMMERCIAL

## 2019-11-14 ENCOUNTER — TRANSCRIBE ORDERS (OUTPATIENT)
Dept: LAB | Facility: CLINIC | Age: 84
End: 2019-11-14

## 2019-11-14 DIAGNOSIS — E78.2 MIXED HYPERLIPIDEMIA: Primary | ICD-10-CM

## 2019-11-14 DIAGNOSIS — E78.2 MIXED HYPERLIPIDEMIA: ICD-10-CM

## 2019-11-14 DIAGNOSIS — E13.10 TYPE II OR UNSPECIFIED TYPE DIABETES MELLITUS WITH KETOACIDOSIS, UNCONTROLLED(250.12) (HCC): Primary | ICD-10-CM

## 2019-11-14 DIAGNOSIS — E13.10 TYPE II OR UNSPECIFIED TYPE DIABETES MELLITUS WITH KETOACIDOSIS, UNCONTROLLED(250.12) (HCC): ICD-10-CM

## 2019-11-14 LAB
ALBUMIN SERPL BCP-MCNC: 3.6 G/DL (ref 3.5–5)
ALP SERPL-CCNC: 107 U/L (ref 46–116)
ALT SERPL W P-5'-P-CCNC: 21 U/L (ref 12–78)
ANION GAP SERPL CALCULATED.3IONS-SCNC: 10 MMOL/L (ref 4–13)
AST SERPL W P-5'-P-CCNC: 20 U/L (ref 5–45)
BILIRUB SERPL-MCNC: 0.7 MG/DL (ref 0.2–1)
BUN SERPL-MCNC: 15 MG/DL (ref 5–25)
CALCIUM SERPL-MCNC: 8.7 MG/DL (ref 8.3–10.1)
CHLORIDE SERPL-SCNC: 105 MMOL/L (ref 100–108)
CHOLEST SERPL-MCNC: 239 MG/DL (ref 50–200)
CO2 SERPL-SCNC: 27 MMOL/L (ref 21–32)
CREAT SERPL-MCNC: 0.88 MG/DL (ref 0.6–1.3)
ERYTHROCYTE [DISTWIDTH] IN BLOOD BY AUTOMATED COUNT: 13.9 % (ref 11.6–15.1)
GFR SERPL CREATININE-BSD FRML MDRD: 61 ML/MIN/1.73SQ M
GLUCOSE P FAST SERPL-MCNC: 103 MG/DL (ref 65–99)
HCT VFR BLD AUTO: 41.1 % (ref 34.8–46.1)
HDLC SERPL-MCNC: 77 MG/DL
HGB BLD-MCNC: 13.3 G/DL (ref 11.5–15.4)
LDLC SERPL CALC-MCNC: 149 MG/DL (ref 0–100)
MCH RBC QN AUTO: 29 PG (ref 26.8–34.3)
MCHC RBC AUTO-ENTMCNC: 32.4 G/DL (ref 31.4–37.4)
MCV RBC AUTO: 90 FL (ref 82–98)
NONHDLC SERPL-MCNC: 162 MG/DL
PLATELET # BLD AUTO: 243 THOUSANDS/UL (ref 149–390)
PMV BLD AUTO: 10.9 FL (ref 8.9–12.7)
POTASSIUM SERPL-SCNC: 3.8 MMOL/L (ref 3.5–5.3)
PROT SERPL-MCNC: 7.6 G/DL (ref 6.4–8.2)
RBC # BLD AUTO: 4.59 MILLION/UL (ref 3.81–5.12)
SODIUM SERPL-SCNC: 142 MMOL/L (ref 136–145)
TRIGL SERPL-MCNC: 64 MG/DL
WBC # BLD AUTO: 5.26 THOUSAND/UL (ref 4.31–10.16)

## 2019-11-14 PROCEDURE — 85027 COMPLETE CBC AUTOMATED: CPT

## 2019-11-14 PROCEDURE — 36415 COLL VENOUS BLD VENIPUNCTURE: CPT

## 2019-11-14 PROCEDURE — 80053 COMPREHEN METABOLIC PANEL: CPT

## 2019-11-14 PROCEDURE — 80061 LIPID PANEL: CPT

## 2020-06-17 ENCOUNTER — HOSPITAL ENCOUNTER (OUTPATIENT)
Dept: MRI IMAGING | Facility: HOSPITAL | Age: 85
Discharge: HOME | End: 2020-06-17
Attending: NEUROLOGICAL SURGERY
Payer: COMMERCIAL

## 2020-06-17 DIAGNOSIS — D32.1 SPINAL MENINGIOMA (HCC): ICD-10-CM

## 2020-06-17 DIAGNOSIS — Z98.1 S/P SPINAL FUSION: ICD-10-CM

## 2020-06-17 DIAGNOSIS — IMO0002 ADJACENT SEGMENT DISEASE WITH SPINAL STENOSIS: ICD-10-CM

## 2020-06-17 PROCEDURE — 72141 MRI NECK SPINE W/O DYE: CPT

## 2020-06-23 ENCOUNTER — TELEPHONE (OUTPATIENT)
Dept: NEUROSURGERY | Facility: CLINIC | Age: 85
End: 2020-06-23

## 2020-06-24 ENCOUNTER — OFFICE VISIT (OUTPATIENT)
Dept: NEUROSURGERY | Facility: CLINIC | Age: 85
End: 2020-06-24
Payer: COMMERCIAL

## 2020-06-24 VITALS
HEART RATE: 66 BPM | RESPIRATION RATE: 16 BRPM | WEIGHT: 137.8 LBS | DIASTOLIC BLOOD PRESSURE: 85 MMHG | BODY MASS INDEX: 26.01 KG/M2 | SYSTOLIC BLOOD PRESSURE: 151 MMHG | TEMPERATURE: 97.4 F | HEIGHT: 61 IN

## 2020-06-24 DIAGNOSIS — D32.1 SPINAL MENINGIOMA (HCC): Primary | ICD-10-CM

## 2020-06-24 DIAGNOSIS — Z98.1 S/P SPINAL FUSION: ICD-10-CM

## 2020-06-24 DIAGNOSIS — IMO0002 ADJACENT SEGMENT DISEASE WITH SPINAL STENOSIS: ICD-10-CM

## 2020-06-24 PROCEDURE — 99214 OFFICE O/P EST MOD 30 MIN: CPT | Performed by: NEUROLOGICAL SURGERY

## 2020-06-24 RX ORDER — PANTOPRAZOLE SODIUM 40 MG/1
1 TABLET, DELAYED RELEASE ORAL DAILY
COMMUNITY
Start: 2020-05-10

## 2021-02-09 DIAGNOSIS — Z23 ENCOUNTER FOR IMMUNIZATION: ICD-10-CM

## 2021-02-11 ENCOUNTER — IMMUNIZATIONS (OUTPATIENT)
Dept: FAMILY MEDICINE CLINIC | Facility: HOSPITAL | Age: 86
End: 2021-02-11

## 2021-02-11 DIAGNOSIS — Z23 ENCOUNTER FOR IMMUNIZATION: Primary | ICD-10-CM

## 2021-02-11 PROCEDURE — 0001A SARS-COV-2 / COVID-19 MRNA VACCINE (PFIZER-BIONTECH) 30 MCG: CPT

## 2021-02-11 PROCEDURE — 91300 SARS-COV-2 / COVID-19 MRNA VACCINE (PFIZER-BIONTECH) 30 MCG: CPT

## 2021-03-02 ENCOUNTER — IMMUNIZATIONS (OUTPATIENT)
Dept: FAMILY MEDICINE CLINIC | Facility: HOSPITAL | Age: 86
End: 2021-03-02

## 2021-03-02 DIAGNOSIS — Z23 ENCOUNTER FOR IMMUNIZATION: Primary | ICD-10-CM

## 2021-03-02 PROCEDURE — 91300 SARS-COV-2 / COVID-19 MRNA VACCINE (PFIZER-BIONTECH) 30 MCG: CPT

## 2021-03-02 PROCEDURE — 0002A SARS-COV-2 / COVID-19 MRNA VACCINE (PFIZER-BIONTECH) 30 MCG: CPT

## 2021-03-21 ENCOUNTER — APPOINTMENT (EMERGENCY)
Dept: RADIOLOGY | Facility: HOSPITAL | Age: 86
DRG: 069 | End: 2021-03-21
Payer: COMMERCIAL

## 2021-03-21 ENCOUNTER — APPOINTMENT (EMERGENCY)
Dept: CT IMAGING | Facility: HOSPITAL | Age: 86
DRG: 069 | End: 2021-03-21
Payer: COMMERCIAL

## 2021-03-21 ENCOUNTER — HOSPITAL ENCOUNTER (INPATIENT)
Facility: HOSPITAL | Age: 86
LOS: 1 days | Discharge: HOME | DRG: 069 | End: 2021-03-23
Attending: EMERGENCY MEDICINE | Admitting: INTERNAL MEDICINE
Payer: COMMERCIAL

## 2021-03-21 DIAGNOSIS — G45.9 TIA (TRANSIENT ISCHEMIC ATTACK): Primary | ICD-10-CM

## 2021-03-21 PROBLEM — H54.40 BLIND, UNILATERAL: Status: ACTIVE | Noted: 2021-03-21

## 2021-03-21 PROBLEM — E53.8 VITAMIN B12 DEFICIENCY: Status: ACTIVE | Noted: 2021-03-21

## 2021-03-21 PROBLEM — K21.9 GERD (GASTROESOPHAGEAL REFLUX DISEASE): Status: ACTIVE | Noted: 2021-03-21

## 2021-03-21 LAB
ALBUMIN SERPL BCP-MCNC: 4 G/DL (ref 3.4–4.8)
ALP SERPL-CCNC: 84 U/L (ref 35–140)
ALT SERPL W P-5'-P-CCNC: 11 U/L (ref 5–54)
ANION GAP SERPL CALCULATED.3IONS-SCNC: 6 MMOL/L (ref 4–13)
APTT PPP: 26 SECONDS (ref 23–31)
AST SERPL W P-5'-P-CCNC: 21 U/L (ref 15–41)
ATRIAL RATE: 71 BPM
BACTERIA UR QL AUTO: ABNORMAL /HPF
BILIRUB DIRECT SERPL-MCNC: 0.14 MG/DL (ref 0–0.3)
BILIRUB SERPL-MCNC: 0.66 MG/DL (ref 0.3–1.2)
BILIRUB UR QL STRIP: NEGATIVE
BUN SERPL-MCNC: 18 MG/DL (ref 6–20)
CALCIUM SERPL-MCNC: 9 MG/DL (ref 8.4–10.2)
CHLORIDE SERPL-SCNC: 104 MMOL/L (ref 96–108)
CLARITY UR: CLEAR
CO2 SERPL-SCNC: 28 MMOL/L (ref 22–33)
COLOR UR: YELLOW
CREAT SERPL-MCNC: 0.9 MG/DL (ref 0.4–1.1)
ERYTHROCYTE [DISTWIDTH] IN BLOOD BY AUTOMATED COUNT: 13.8 % (ref 11.6–15.1)
FLUAV RNA RESP QL NAA+PROBE: NEGATIVE
FLUBV RNA RESP QL NAA+PROBE: NEGATIVE
GFR SERPL CREATININE-BSD FRML MDRD: 58 ML/MIN/1.73SQ M
GLUCOSE SERPL-MCNC: 118 MG/DL (ref 65–140)
GLUCOSE UR STRIP-MCNC: NEGATIVE MG/DL
HCT VFR BLD AUTO: 39.5 % (ref 34.8–46.1)
HGB BLD-MCNC: 12.9 G/DL (ref 11.5–15.4)
HGB UR QL STRIP.AUTO: ABNORMAL
INR PPP: 0.94 (ref 0.9–1.1)
KETONES UR STRIP-MCNC: NEGATIVE MG/DL
LEUKOCYTE ESTERASE UR QL STRIP: NEGATIVE
MCH RBC QN AUTO: 28.9 PG (ref 26.8–34.3)
MCHC RBC AUTO-ENTMCNC: 32.7 G/DL (ref 31.4–37.4)
MCV RBC AUTO: 88 FL (ref 82–98)
NITRITE UR QL STRIP: NEGATIVE
NON-SQ EPI CELLS URNS QL MICRO: ABNORMAL /HPF
P AXIS: 45 DEGREES
PH UR STRIP.AUTO: 6 [PH]
PLATELET # BLD AUTO: 228 THOUSANDS/UL (ref 149–390)
PMV BLD AUTO: 11.4 FL (ref 8.9–12.7)
POTASSIUM SERPL-SCNC: 4 MMOL/L (ref 3.5–5)
PR INTERVAL: 115 MS
PROT SERPL-MCNC: 7 G/DL (ref 6.4–8.3)
PROT UR STRIP-MCNC: NEGATIVE MG/DL
PROTHROMBIN TIME: 10.7 SECONDS (ref 9.5–12.1)
QRS AXIS: 10 DEGREES
QRSD INTERVAL: 93 MS
QT INTERVAL: 402 MS
QTC INTERVAL: 437 MS
RBC # BLD AUTO: 4.47 MILLION/UL (ref 3.81–5.12)
RBC #/AREA URNS AUTO: ABNORMAL /HPF
RSV RNA RESP QL NAA+PROBE: NEGATIVE
SARS-COV-2 RNA RESP QL NAA+PROBE: NEGATIVE
SODIUM SERPL-SCNC: 138 MMOL/L (ref 133–145)
SP GR UR STRIP.AUTO: <=1.005 (ref 1–1.03)
T WAVE AXIS: 32 DEGREES
TROPONIN I SERPL-MCNC: <0.03 NG/ML (ref 0–0.07)
UROBILINOGEN UR QL STRIP.AUTO: 1 E.U./DL
VENTRICULAR RATE: 71 BPM
WBC # BLD AUTO: 4.34 THOUSAND/UL (ref 4.31–10.16)
WBC #/AREA URNS AUTO: ABNORMAL /HPF

## 2021-03-21 PROCEDURE — 80048 BASIC METABOLIC PNL TOTAL CA: CPT | Performed by: EMERGENCY MEDICINE

## 2021-03-21 PROCEDURE — 99285 EMERGENCY DEPT VISIT HI MDM: CPT | Performed by: PHYSICIAN ASSISTANT

## 2021-03-21 PROCEDURE — 99220 PR INITIAL OBSERVATION CARE/DAY 70 MINUTES: CPT | Performed by: INTERNAL MEDICINE

## 2021-03-21 PROCEDURE — 85610 PROTHROMBIN TIME: CPT | Performed by: EMERGENCY MEDICINE

## 2021-03-21 PROCEDURE — 84484 ASSAY OF TROPONIN QUANT: CPT | Performed by: EMERGENCY MEDICINE

## 2021-03-21 PROCEDURE — 85027 COMPLETE CBC AUTOMATED: CPT | Performed by: EMERGENCY MEDICINE

## 2021-03-21 PROCEDURE — 71045 X-RAY EXAM CHEST 1 VIEW: CPT

## 2021-03-21 PROCEDURE — 81001 URINALYSIS AUTO W/SCOPE: CPT | Performed by: INTERNAL MEDICINE

## 2021-03-21 PROCEDURE — 93005 ELECTROCARDIOGRAM TRACING: CPT

## 2021-03-21 PROCEDURE — NC001 PR NO CHARGE: Performed by: PSYCHIATRY & NEUROLOGY

## 2021-03-21 PROCEDURE — 85730 THROMBOPLASTIN TIME PARTIAL: CPT | Performed by: EMERGENCY MEDICINE

## 2021-03-21 PROCEDURE — 93010 ELECTROCARDIOGRAM REPORT: CPT | Performed by: INTERNAL MEDICINE

## 2021-03-21 PROCEDURE — 99285 EMERGENCY DEPT VISIT HI MDM: CPT

## 2021-03-21 PROCEDURE — 36415 COLL VENOUS BLD VENIPUNCTURE: CPT | Performed by: EMERGENCY MEDICINE

## 2021-03-21 PROCEDURE — 0241U HB NFCT DS VIR RESP RNA 4 TRGT: CPT | Performed by: INTERNAL MEDICINE

## 2021-03-21 PROCEDURE — 80076 HEPATIC FUNCTION PANEL: CPT | Performed by: INTERNAL MEDICINE

## 2021-03-21 RX ORDER — ASPIRIN 81 MG/1
81 TABLET, CHEWABLE ORAL DAILY
Status: DISCONTINUED | OUTPATIENT
Start: 2021-03-22 | End: 2021-03-23 | Stop reason: HOSPADM

## 2021-03-21 RX ORDER — ATORVASTATIN CALCIUM 40 MG/1
40 TABLET, FILM COATED ORAL EVERY EVENING
Status: DISCONTINUED | OUTPATIENT
Start: 2021-03-21 | End: 2021-03-23 | Stop reason: HOSPADM

## 2021-03-21 RX ORDER — ASPIRIN 325 MG
325 TABLET ORAL ONCE
Status: COMPLETED | OUTPATIENT
Start: 2021-03-21 | End: 2021-03-21

## 2021-03-21 RX ADMIN — ATORVASTATIN CALCIUM 40 MG: 40 TABLET, FILM COATED ORAL at 18:00

## 2021-03-21 RX ADMIN — ASPIRIN 325 MG: 325 TABLET ORAL at 13:54

## 2021-03-21 RX ADMIN — ENOXAPARIN SODIUM 40 MG: 40 INJECTION SUBCUTANEOUS at 14:38

## 2021-03-21 NOTE — H&P
[place]  67   H&P- Stephanie Rocha 1935, 81 y o  female MRN: [id number]  Unit/Bed#: ED 14 Encounter: [id number]  Primary Care Provider: Kris Soto MD   Date and time admitted to hospital: 3/21/2021 12:16 PM    * TIA (transient ischemic attack)  Assessment & Plan  Patient presents with dizziness since yesterday and left side numbness since this morning 9:00 a m     Currently the symptoms has resolved  No motor weakness noted  NIH stroke score of 0    neurology was contacted by the ER and recommended to admit here  Will do 2D echocardiogram and vascular carotid Doppler  CT of the head showed no mass effect no acute intracranial hemorrhage  Patient however refused CTA head and neck as she stated that she could not tolerate the diet  Neuro will consult Neurology  EKG shows sinus rhythm no history arrhythmia in the past, will place on cardiac monitor  Will give aspirin 81 mg daily and statin  And check lipid panel and A1c  GERD (gastroesophageal reflux disease)  Assessment & Plan  Continue PPI    Vitamin B12 deficiency  Assessment & Plan  Continue vitamin B12 injections Q monthly  S/P spinal fusion  Assessment & Plan  Continue supportive care and PT/OT  VTE Prophylaxis: Enoxaparin (Lovenox)  / sequential compression device   Code Status: full code  POLST: There is no POLST form on file for this patient (pre-hospital)  Discussion with family: yes with daughter    Anticipated Length of Stay:  Patient will be admitted on an Observation basis with an anticipated length of stay of  2 midnights  Justification for Hospital Stay: tia    Total Time for Visit, including Counseling / Coordination of Care: 45 minutes  Greater than 50% of this total time spent on direct patient counseling and coordination of care      Chief Complaint:   Numbness and dizziness    History of Present Illness:    Stephanie Rocha is a 81 y o  female who presents with history of TIA many years ago, spinal cord tumor status post surgery 6 years ago presents with complaints of dizziness and left-sided numbness  Patient stated that she was feeling had dizzy lightheaded yesterday and today morning she woke up around 9:00 a m  She felt left-sided numbness both upper and lower extremity  Patient however had no weakness and she was able to get up and go to the bathroom and however breakfast   Patient stated that the numbness persisted and she came into the ER to be checked out  Patient denies of any headache or blurry vision  She has right eye blind  Patient denies of any chest pain or shortness of breath or palpitations  Patient denies of nausea vomiting or abdominal pain  Patient denies of any bladder or bowel incontinence  Patient denies of any fall or trauma  Patient denies of fever or chills  In the ED she had a CT of the head which showed no acute  changes and she refused to get CTA head and neck and Neurology was consulted by the ED staff and recommended to be observed here  Review of Systems:    Review of Systems   Constitutional: Negative  HENT: Negative  Eyes: Negative  Respiratory: Negative  Cardiovascular: Negative  Gastrointestinal: Negative  Endocrine: Negative  Genitourinary: Negative  Musculoskeletal: Negative  Skin: Negative  Allergic/Immunologic: Negative  Neurological: Positive for dizziness, light-headedness and numbness  Hematological: Negative  Psychiatric/Behavioral: Negative          Past Medical and Surgical History:     Past Medical History:   Diagnosis Date    Breast disorder     GERD (gastroesophageal reflux disease)     Intradural extramedullary spinal tumor     Legally blind in right eye, 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 [address]     Thyroid disorder        Past Surgical History:   Procedure Laterality Date    APPENDECTOMY      BACK SURGERY      Posterior cervical thoracic surgery with fusion for resection of T1-2 meningioma (IDEM), WHO 1    BREAST BIOPSY Right     BREAST SURGERY      GALLBLADDER SURGERY      HERNIA REPAIR      POSTERIOR CERVICAL LAMINECTOMY      CT XCAPSL CTRC RMVL INSJ IO LENS PROSTH W/O ECP Left 6/24/2019    Procedure: EXTRACTION EXTRACAPSULAR CATARACT PHACO INTRAOCULAR LENS (IOL); Surgeon: Dejah Luna MD;  [facility] MAIN OR;  Service: Ophthalmology    THYROID SURGERY      VARICOSE VEIN SURGERY         Meds/Allergies:    Prior to Admission medications    Medication Sig Start Date End Date Taking? Authorizing Provider   Cyanocobalamin (B-12) 1000 MCG/ML KIT Inject as directed every 30 (thirty) days     Historical Provider, MD   pantoprazole (PROTONIX) 40 mg tablet Take 1 tablet by mouth daily 5/10/20   Historical Provider, MD     I have reviewed home medications with patient personally  Allergies: Allergies   Allergen Reactions    Povidone Iodine Hives    Penicillins Rash       Social History:     Marital Status:     Occupation: retired  Patient Pre-hospital Living Situation: independent  Patient Pre-hospital Level of Mobility: independent   Patient Pre-hospital Diet Restrictions: regular  Substance Use History:   Social History     Substance and Sexual Activity   Alcohol Use Yes    Frequency: Monthly or less    Comment: rare     Social History     Tobacco Use   Smoking Status Never Smoker   Smokeless Tobacco Never Used     Social History     Substance and Sexual Activity   Drug Use No       Family History:    non-contributory    Physical Exam:     Vitals:   Blood Pressure: (!) 177/68 (03/21/21 1217)  Pulse: 76 (03/21/21 1217)  Temperature: 97 8 °F (36 6 °C) (03/21/21 1219)  Temp Source: Oral (03/21/21 1219)  Respirations: 18 (03/21/21 1217)  Weight - Scale: 60 3 kg (133 lb) (03/21/21 1251)  SpO2: 99 % (03/21/21 1217)    Physical Exam      HEENT-PERRLA, moist oral mucosa  Neck-supple, no JVD elevation   Respiratory-equal air entry bilaterally, no rales or rhonchi  Cardiovascular system-S1, S2 heard, no murmur or gallops or rubs  Abdomen-soft, nontender, no guarding or rigidity, bowel sounds heard  Extremities-no pedal edema  Peripheral pulses palpable  Musculoskeletal-no contractures  Central nervous system-no acute focal neurological deficit ,no sensory or motor deficit noted  Skin-no rash noted        Additional Data:     Lab Results: I have personally reviewed pertinent reports  Results from last 7 days   Lab Units 03/21/21  1252   WBC Thousand/uL 4 34   HEMOGLOBIN g/dL 12 9   HEMATOCRIT % 39 5   PLATELETS Thousands/uL 228     Results from last 7 days   Lab Units 03/21/21  1252   SODIUM mmol/L 138   POTASSIUM mmol/L 4 0   CHLORIDE mmol/L 104   CO2 mmol/L 28   BUN mg/dL 18   CREATININE mg/dL 0 90   ANION GAP mmol/L 6   CALCIUM mg/dL 9 0   GLUCOSE RANDOM mg/dL 118     Results from last 7 days   Lab Units 03/21/21  1252   INR  0 94                   Imaging: I have personally reviewed pertinent reports  CT stroke alert brain   Final Result by Whit Lacey MD (03/21 1256)      No mass effect, acute intracranial hemorrhage or CT evidence for a large acute vascular distribution infarct  Mild chronic microvascular ischemic change  Findings were directly discussed with Gayle Contreras on 3/21/2021 12:49 PM       Workstation performed: [id number]         XR chest portable    (Results Pending)   VAS carotid complete study (*Order only if CTA has not been completed*)    (Results Pending)       EKG, Pathology, and Other Studies Reviewed on Admission:   · EKG:  Sinus rhythm with no acute ST-T changes noted  Allscripts / Epic Records Reviewed: Yes     ** Please Note: This note has been constructed using a voice recognition system   **

## 2021-03-21 NOTE — ASSESSMENT & PLAN NOTE
Patient presents with dizziness since yesterday and left side numbness since this morning 9:00 a m     Currently the symptoms has resolved  No motor weakness noted  NIH stroke score of 0    neurology was contacted by the ER and recommended to admit here  Will do 2D echocardiogram and vascular carotid Doppler  CT of the head showed no mass effect no acute intracranial hemorrhage  Patient however refused CTA head and neck as she stated that she could not tolerate the diet  Neuro will consult Neurology  EKG shows sinus rhythm no history arrhythmia in the past, will place on cardiac monitor  Will give aspirin 81 mg daily and statin  And check lipid panel and A1c

## 2021-03-21 NOTE — TELEMEDICINE
TeleConsultation - Stroke   Sarika Melgar 81 y o  female MRN: [id number]   Encounter: [id number]      REQUIRED DOCUMENTATION:     1  This service was provided via Telemedicine  2  Provider located at home  3  TeleMed provider: Luis Vazquez MD   4  Identify all parties in room with patient during tele consult:  RN  5  Unfortunately it could not be completed due to technical difficulties with establishing a connection       Assessment/Plan   Assessment:   Abnormal sensation on left side:      TPA Decision: Patient not a TPA candidate  Symptoms resolved/clearly non disabling  Plan:   Admit for stroke pathway  We will obtain MRI brain w/o contrast, TTE, LDL, HgbA1c and TSH  PT/OT  Aspirin 325 followed by 81 mg daily, and lipitor 40 mg daily    History of Present Illness     Reason for Consult / Principal Problem: abnormal sensation  Patient last known well: early morning  Neurology time of arrival: 12:33 pm  HPI: Sarika Melgar is a 81 y o  female who presents with right sided "weird sensation" involving face, arm and leg that started suddenly this morning  She has never had that before  She reported that she was feeling dizzy last night  No other neurological symptoms  She has history of spinal meningioma s/p resection with fixation C4-T2    Consult to Neurology  Consult performed by:  Luis Vazquez MD  Consult ordered by: Genesis Denson DO          Review of Systems  Complete ROS was done and is negative other than what is mentioned in HPI    Historical Information   Past Medical History:   Diagnosis Date    Breast disorder     GERD (gastroesophageal reflux disease)     Intradural extramedullary spinal tumor     Legally blind in right eye, 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     Sp[facility] ([facility] [address] )     Thyroid disorder      Past Surgical History:   Procedure Laterality Date    APPENDECTOMY      BACK SURGERY      Posterior cervical thoracic surgery with fusion for resection of T1-2 meningioma (IDEM), WHO 1    BREAST BIOPSY Right     BREAST SURGERY      GALLBLADDER SURGERY      HERNIA REPAIR      POSTERIOR CERVICAL LAMINECTOMY      ND XCAPSL CTRC RMVL INSJ IO LENS PROSTH W/O ECP Left 6/24/2019    Procedure: EXTRACTION EXTRACAPSULAR CATARACT PHACO INTRAOCULAR LENS (IOL); Surgeon: Brandy Mart MD;  Location[facility] MAIN OR;  Service: Ophthalmology    THYROID SURGERY      VARICOSE VEIN SURGERY       Social History   Social History     Substance and Sexual Activity   Alcohol Use Yes    Frequency: Monthly or less    Comment: rare     Social History     Substance and Sexual Activity   Drug Use No     Social History     Tobacco Use   Smoking Status Never Smoker   Smokeless Tobacco Never Used     Family History: non-contributory    Review of previous medical records was completed  Meds/Allergies   PTA meds:   Prior to Admission Medications   Prescriptions Last Dose Informant Patient Reported? Taking? Cyanocobalamin (B-12) 1000 MCG/ML KIT  Self Yes No   Sig: Inject as directed every 30 (thirty) days    pantoprazole (PROTONIX) 40 mg tablet  Self Yes No   Sig: Take 1 tablet by mouth daily      Facility-Administered Medications: None       Allergies   Allergen Reactions    Povidone Iodine Hives    Penicillins Rash       Objective   Vitals:Blood pressure (!) 177/68, pulse 76, temperature 97 8 °F (36 6 °C), temperature source Oral, resp  rate 18, SpO2 99 %  ,There is no height or weight on file to calculate BMI  No intake or output data in the 24 hours ending 03/21/21 1247    Invasive Devices: Invasive Devices     None                 Imaging Studies: I have personally reviewed pertinent films in PACS  EKG, Pathology, and Other Studies: I have personally reviewed pertinent reports      VTE Prophylaxis: Sequential compression device Kacey Filler)     Code Status: No Order  Advance Directive and Living Will:      Power of :    POLST:

## 2021-03-22 ENCOUNTER — HOSPITAL ENCOUNTER (OUTPATIENT)
Dept: MRI IMAGING | Facility: HOSPITAL | Age: 86
Discharge: HOME | End: 2021-03-22
Payer: COMMERCIAL

## 2021-03-22 ENCOUNTER — APPOINTMENT (OUTPATIENT)
Dept: VASCULAR ULTRASOUND | Facility: HOSPITAL | Age: 86
DRG: 069 | End: 2021-03-22
Payer: COMMERCIAL

## 2021-03-22 DIAGNOSIS — G45.9 TIA (TRANSIENT ISCHEMIC ATTACK): ICD-10-CM

## 2021-03-22 LAB
ANION GAP SERPL CALCULATED.3IONS-SCNC: 7 MMOL/L (ref 4–13)
BUN SERPL-MCNC: 14 MG/DL (ref 6–20)
CALCIUM SERPL-MCNC: 8.6 MG/DL (ref 8.4–10.2)
CHLORIDE SERPL-SCNC: 106 MMOL/L (ref 96–108)
CHOLEST SERPL-MCNC: 208 MG/DL
CO2 SERPL-SCNC: 28 MMOL/L (ref 22–33)
CREAT SERPL-MCNC: 0.82 MG/DL (ref 0.4–1.1)
ERYTHROCYTE [DISTWIDTH] IN BLOOD BY AUTOMATED COUNT: 13.6 % (ref 11.6–15.1)
EST. AVERAGE GLUCOSE BLD GHB EST-MCNC: 114 MG/DL
GFR SERPL CREATININE-BSD FRML MDRD: 65 ML/MIN/1.73SQ M
GLUCOSE SERPL-MCNC: 87 MG/DL (ref 65–140)
HBA1C MFR BLD: 5.6 %
HCT VFR BLD AUTO: 36.4 % (ref 34.8–46.1)
HDLC SERPL-MCNC: 60 MG/DL
HGB BLD-MCNC: 12.2 G/DL (ref 11.5–15.4)
LDLC SERPL CALC-MCNC: 136 MG/DL (ref 0–100)
MCH RBC QN AUTO: 29.4 PG (ref 26.8–34.3)
MCHC RBC AUTO-ENTMCNC: 33.5 G/DL (ref 31.4–37.4)
MCV RBC AUTO: 88 FL (ref 82–98)
PLATELET # BLD AUTO: 211 THOUSANDS/UL (ref 149–390)
PMV BLD AUTO: 11.4 FL (ref 8.9–12.7)
POTASSIUM SERPL-SCNC: 4 MMOL/L (ref 3.5–5)
RBC # BLD AUTO: 4.15 MILLION/UL (ref 3.81–5.12)
SODIUM SERPL-SCNC: 141 MMOL/L (ref 133–145)
TRIGL SERPL-MCNC: 61.2 MG/DL
TSH SERPL DL<=0.05 MIU/L-ACNC: 1.43 UIU/ML (ref 0.34–5.6)
WBC # BLD AUTO: 4.27 THOUSAND/UL (ref 4.31–10.16)

## 2021-03-22 PROCEDURE — 80061 LIPID PANEL: CPT | Performed by: INTERNAL MEDICINE

## 2021-03-22 PROCEDURE — 80048 BASIC METABOLIC PNL TOTAL CA: CPT | Performed by: INTERNAL MEDICINE

## 2021-03-22 PROCEDURE — 85027 COMPLETE CBC AUTOMATED: CPT | Performed by: INTERNAL MEDICINE

## 2021-03-22 PROCEDURE — 97162 PT EVAL MOD COMPLEX 30 MIN: CPT

## 2021-03-22 PROCEDURE — 83036 HEMOGLOBIN GLYCOSYLATED A1C: CPT | Performed by: INTERNAL MEDICINE

## 2021-03-22 PROCEDURE — 93880 EXTRACRANIAL BILAT STUDY: CPT | Performed by: SURGERY

## 2021-03-22 PROCEDURE — 97165 OT EVAL LOW COMPLEX 30 MIN: CPT

## 2021-03-22 PROCEDURE — 70551 MRI BRAIN STEM W/O DYE: CPT

## 2021-03-22 PROCEDURE — G1004 CDSM NDSC: HCPCS

## 2021-03-22 PROCEDURE — 93880 EXTRACRANIAL BILAT STUDY: CPT

## 2021-03-22 PROCEDURE — 84443 ASSAY THYROID STIM HORMONE: CPT | Performed by: INTERNAL MEDICINE

## 2021-03-22 PROCEDURE — 99232 SBSQ HOSP IP/OBS MODERATE 35: CPT | Performed by: INTERNAL MEDICINE

## 2021-03-22 RX ADMIN — ENOXAPARIN SODIUM 40 MG: 40 INJECTION SUBCUTANEOUS at 09:03

## 2021-03-22 RX ADMIN — ASPIRIN 81 MG: 81 TABLET, CHEWABLE ORAL at 09:03

## 2021-03-22 RX ADMIN — ATORVASTATIN CALCIUM 40 MG: 40 TABLET, FILM COATED ORAL at 17:23

## 2021-03-22 NOTE — UTILIZATION REVIEW
Initial Clinical Review    OBS 3/21 @ 1333 UPGRADED TO INPATIENT 3/22 @ 1550 FOR CONTINUED TX TIA WITH NEED FOR MRI AND CONTINUED NEUROLOGY W/U    03/22/21 1550  Inpatient Admission Once     Question Answer Comment   Level of Care Med Surg    Estimated length of stay More than 2 Midnights    Certification I certify that inpatient services are medically necessary for this patient for a duration of greater than two midnights  See H&P and MD Progress Notes for additional information about the patient's course of treatment  03/22/21 1550     ED Arrival Information     Expected Arrival Acuity Means of Arrival Escorted By Service Admission Type    - 3/21/2021 12:11 Urgent Ambulance [facility] EMS General Medicine Urgent    Arrival Complaint    dizziness        Chief Complaint   Patient presents with    Dizziness     pt c/o dizziness starting yesterday, states its hard to describe the feeling "feels like something is off on the left side only", pt also complain of decreased sensation on left arm  Assessment/Plan: 82 y/o female with PMhx of remote TIA, GERD, Vit B12 def, spinal fusion who presents to ED via EMS with c/o dizziness and L-sided numbness  Pt states she was feeling had dizzy and lightheaded yesterday and upon awakening this AM as well as L-sided numbness both upper and lower extremities this AM   However had no weakness and she was able to get up and go to the bathroom & eat breakfast   States that the numbness persisted and she came into the ED  In ED pt sxs have resolved  NIH stroke score 0  Admit observation to M/S/Tele unit with TIA -- telm monitoring  Neuro checks q4h  Asa and statin  Continue home po meds  SCD's  Cardiac diet  Neuro consult  Neuro tele-med consult 3/21 --   Patient not a TPA candidate  Admit for stroke pathway  Obtain MRI brain w/o contrast, TTE, LDL, HgbA1c and TSH   PT/OT  Aspirin 325 followed by 81 mg daily, and lipitor 40 mg daily    3/22 -- states she still feels funny, but dizziness and left-sided numbness has resolved  2D echocardiogram and carotid Doppler were done and pending  MRI to be done tonight  UPGRADED TO INPATIENT ADMISSION  Continue neuro checks q4h  Cardiac diet  SCD's  Up with assist  PT/OT       ED Triage Vitals   Temperature Pulse Respirations Blood Pressure SpO2   03/21/21 1219 03/21/21 1217 03/21/21 1217 03/21/21 1217 03/21/21 1217   97 8 °F (36 6 °C) 76 18 (!) 177/68 99 %      Temp Source Heart Rate Source Patient Position - Orthostatic VS BP Location FiO2 (%)   03/21/21 1219 03/21/21 1217 03/21/21 1217 03/21/21 1217 --   Oral Monitor Lying Left arm       Pain Score       03/21/21 1436       No Pain          Wt Readings from Last 1 Encounters:   03/21/21 60 kg (132 lb 4 4 oz)     Additional Vital Signs:   Date/Time  Temp  Pulse  Resp  BP  MAP (mmHg)  SpO2  O2 Device   03/22/21 0700  97 7 °F (36 5 °C)  68  16  130/88  --  97 %  None (Room air)   03/22/21 0501  --  --  --  --  --  97 %  --   03/22/21 0400  97 6 °F (36 4 °C)  66  18  137/67  --  96 %  None (Room air)   03/22/21 0200  98 °F (36 7 °C)  57  16  118/62  --  95 %  None (Room air)   03/22/21 0000  97 9 °F (36 6 °C)  72  18  114/50  --  95 %  None (Room air)   03/21/21 2200  97 7 °F (36 5 °C)  69  18  138/63  --  94 %  None (Room air)   03/21/21 2000  97 7 °F (36 5 °C)  67  18  134/69  --  --  --   03/21/21 1819  --  63  18  129/78  --  96 %  None (Room air)   03/21/21 1537  97 2 °F (36 2 °C)Abnormal   74  16  162/72  111  95 %  None (Room air)   03/21/21 1437  --  82  17  153/78  --  99 %  None (Room air)   03/21/21 1219  97 8 °F (36 6 °C)  --  --  --  --  --  --   03/21/21 1217  --  76  18  177/68Abnormal   --  99 %  None (Room air)       Pertinent Labs/Diagnostic Test Results:   CT head 3/21 -- No mass effect, acute intracranial hemorrhage or CT evidence for a large acute vascular distribution infarct  Mild chronic microvascular ischemic change  CXR 3/22 -- No acute cardiopulmonary disease  Biapical pleural-parenchymal scarring       EKG:  Sinus rhythm with no acute ST-T changes noted      Results from last 7 days   Lab Units 03/21/21  1728   SARS-COV-2  Negative     Results from last 7 days   Lab Units 03/22/21  0515 03/21/21  1252   WBC Thousand/uL 4 27* 4 34   HEMOGLOBIN g/dL 12 2 12 9   HEMATOCRIT % 36 4 39 5   PLATELETS Thousands/uL 211 228     Results from last 7 days   Lab Units 03/22/21  0515 03/21/21  1252   SODIUM mmol/L 141 138   POTASSIUM mmol/L 4 0 4 0   CHLORIDE mmol/L 106 104   CO2 mmol/L 28 28   ANION GAP mmol/L 7 6   BUN mg/dL 14 18   CREATININE mg/dL 0 82 0 90   EGFR ml/min/1 73sq m 65 58   CALCIUM mg/dL 8 6 9 0     Results from last 7 days   Lab Units 03/21/21  1252   AST U/L 21   ALT U/L 11   ALK PHOS U/L 84 0   TOTAL PROTEIN g/dL 7 0   ALBUMIN g/dL 4 0   TOTAL BILIRUBIN mg/dL 0 66   BILIRUBIN DIRECT mg/dL 0 14     Results from last 7 days   Lab Units 03/22/21  0515 03/21/21  1252   GLUCOSE RANDOM mg/dL 87 118     Results from last 7 days   Lab Units 03/21/21  1252   TROPONIN I ng/mL <0 03     Results from last 7 days   Lab Units 03/21/21  1252   PROTIME seconds 10 7   INR  0 94   PTT seconds 26     Results from last 7 days   Lab Units 03/21/21  1942   CLARITY UA  Clear   COLOR UA  Yellow   SPEC GRAV UA  <=1 005   PH UA  6 0   GLUCOSE UA mg/dl Negative   KETONES UA mg/dl Negative   BLOOD UA  Trace-Intact*   PROTEIN UA mg/dl Negative   NITRITE UA  Negative   BILIRUBIN UA  Negative   UROBILINOGEN UA E U /dl 1 0   LEUKOCYTES UA  Negative   WBC UA /hpf 1-2   RBC UA /hpf 1-2   BACTERIA UA /hpf Occasional   EPITHELIAL CELLS WET PREP /hpf Occasional     Results from last 7 days   Lab Units 03/21/21  1728   INFLUENZA A PCR  Negative   INFLUENZA B PCR  Negative   RSV PCR  Negative     ED Treatment:   Medication Administration from 03/21/2021 1211 to 03/21/2021 1517       Date/Time Order Dose Route Action     03/21/2021 1354 aspirin tablet 325 mg 325 mg Oral Given     03/21/2021 1438 enoxaparin (LOVENOX) subcutaneous injection 40 mg 40 mg Subcutaneous Given     Past Medical History:   Diagnosis Date    Breast disorder     GERD (gastroesophageal reflux disease)     Intradural extramedullary spinal tumor     Legally blind in right eye, 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     [facility] ([facility] [address] )     Thyroid disorder        Admitting Diagnosis: TIA (transient ischemic attack) [G45 9]  Dizziness [R42]  Age/Sex: 81 y o  female  Admission Orders:  Scheduled Medications:  aspirin, 81 mg, Oral, Daily  atorvastatin, 40 mg, Oral, QPM  enoxaparin, 40 mg, Subcutaneous, Daily           IP CONSULT TO NEUROLOGY  IP CONSULT TO NEUROLOGY  IP CONSULT TO CASE MANAGEMENT  IP CONSULT TO NUTRITION SERVICES    Network Utilization Review Department  ATTENTION: Please call with any questions or concerns to [phone] and carefully listen to the prompts so that you are directed to the right person  All voicemails are confidential   Katie Resendez all requests for admission clinical reviews, approved or denied determinations and any other requests to dedicated fax number below belonging to the campus where the patient is receiving treatment   List of dedicated fax numbers for the Facilities:  [address] DENIALS (Administrative/Medical Necessity) [phone]   [address] (Maternity/NICU/Pediatrics) [phone]   [address] Robe Aaron 9666 (Brady Garner "Lucille" 256) [address] [place] [address] Marielle Zelaya 1481 [address] [place]) [phone]  8974 [facility] [phone]

## 2021-03-22 NOTE — OCCUPATIONAL THERAPY NOTE
Occupational Therapy Evaluation       03/22/21 0938   Note Type   Note type Evaluation   Pain Assessment   Pain Assessment Tool 0-10   Pain Score No Pain   Home Living   Type of Home Apartment  (Senior Apartment Complex)   Home Layout One level;Elevator  (1st floor apartment, uses stairs to 2nd floor to get mail)   Bathroom Shower/Tub Tub/shower unit   [employer] Grab bars in shower;Grab bars around toilet   [address] Other (Comment)  (None)   Additional Comments Patient presented to hospital for TIA, c/o dizziness and L sided decreased sensation   Prior Function   Level of [place] Independent with ADLs and functional mobility   Lives With Alone   Receives Help From Family;Friend(s)   ADL Assistance Independent   IADLs Independent   Falls in the last 6 months 0   Comments Patient reports PTA totally independent in ADLs and mobility without AD; mostly independent iADLs, has friend drive her to grocery store weekly, other times patient reports her children order her groceries online to her apartment   ADL   Eating Assistance 7  [facility] 7  Independent   Grooming Deficit   (Combing hair standing to sink)   UB Bathing Assistance 7  Independent   LB Bathing Assistance 7  Independent   UB Dressing Assistance 7  Independent   LB Dressing Assistance 7  [address]   Supine to Sit 7  Independent   Sit to Supine 7  Independent   Transfers   Sit to Stand 7  Independent   Stand to [address] transfer 7  Independent   Additional items Standard toilet  (No AD)   Functional Mobility   Functional Mobility 7  Independent   Additional Comments Patient ambulated short household distance in room to/from bathroom then long household distance in hallway without AD   Balance   Static Sitting Normal   Dynamic Sitting Good   Static Standing Good   Dynamic Standing Good   Activity Tolerance   Activity Tolerance Patient tolerated treatment well   RUE Assessment   RUE Assessment WFL   LUE Assessment   LUE Assessment WFL   Hand Function   Gross Motor Coordination Functional   Fine Motor Coordination Functional   Sensation   Light Touch No apparent deficits   Sharp/Dull No apparent deficits   Additional Comments Patient reports even sensation BUEs and BLEs, reports L side of face feels 'off' but WFL to testing   Vision-Basic Assessment   Current Vision Wears glasses for distance only   Vision - Complex Assessment   Ocular Range of Motion WFL   Acuity Able to read employee name badge without difficulty   Cognition   Overall Cognitive Status [facility]   Arousal/Participation Alert; Cooperative   Attention Within functional limits   Orientation Level Oriented X4   Following Commands Follows all commands and directions without difficulty   Assessment   Prognosis Good   Assessment Patient evaluated by Occupational Therapy  Patient admitted with TIA (transient ischemic attack)  The patients occupational profile, medical and therapy history includes a brief history with review of medical and/or therapy records related to the presenting problem  Comorbidities affecting functional mobility and ADLS include: spinal meningioma, thyroid disorder, GERD, 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  Prior to admission, patient was independent with functional mobility without assistive device, independent with ADLS and independent with IADLS  The evaluation identifies the following performance deficits: no deficits, that result in activity limitations and/or participation restrictions  This evaluation requires clinical decision making of low complexity, because the patients presents with no comorbidities that affect occupational performance and required no modification of tasks or assistance with consideration of a limited number of treatment options    The Barthel Index was used as a functional outcome tool presenting with a score of 100, indicating no limitations of functional mobility and ADLS  The patient's raw score on the AM-PAC Daily Activity inpatient short form is 24, standardized score is 57 54, greater than 39 4  Patients at this level are likely to benefit from DC to home  Please refer to the recommendation of the Occupational Therapist for safe DC planning  Patient currently presents at baseline functional status, is independent in all ADLs and mobility without AD, no neurological deficits noted on eval  No further skilled inpatient OT services indicated at this time     Goals   Patient Goals To go home   Plan   OT Frequency Eval only   Recommendation   OT Discharge Recommendation Return to previous environment with no needs   AM-PAC Daily Activity Inpatient   Lower Body Dressing 4   Bathing 4   Toileting 4   Upper Body Dressing 4   Grooming 4   Eating 4   Daily Activity Raw Score 24   Daily Activity Standardized Score (Calc for Raw Score >=11) 57 54   AM-PAC Applied Cognition Inpatient   Following a Speech/Presentation 4   Understanding Ordinary Conversation 4   Taking Medications 4   Remembering Where Things Are Placed or Put Away 4   Remembering List of 4-5 Errands 4   Taking Care of Complicated Tasks 4   Applied Cognition Raw Score 24   Applied Cognition Standardized Score 62 21   Barthel Index   Feeding 10   Bathing 5   Grooming Score 5   Dressing Score 10   Bladder Score 10   Bowels Score 10   Toilet Use Score 10   Transfers (Bed/Chair) Score 15   Mobility (Level Surface) Score 15   Stairs Score 10   Barthel Index Score 100   Modified [place] Scale   Modified [place] Scale 0       Tor Elms, OTR/L

## 2021-03-22 NOTE — ASSESSMENT & PLAN NOTE
· Presented with dizziness since the day before admission and left sided numbness at the day of admission around 9 am    · She also reports of left ear fullness  · NIHSS 0  · No motor weakness, neurological exam is grossly benign  · Neurology was contacted by the ER  · CT of the head showed no mass effect, no acute intracranial hemorrhage  · Patient refused CTA head and neck as she stated that she could not tolerate the dye    · EKG shows sinus rhythm, no history of arrhythmia in the past   · Lipid panel showed total cholesterol 208, HDL 60, , TG 61 2  · TSH 1 4  · She was given aspirin 325 mg once followed by aspirin 81 mg      Plan:  · Continue aspirin 81 mg daily and statin 40 mg  · Will follow up with HbA1c  · MRI brain pending  · Neuro consult, appreciate recommendation

## 2021-03-22 NOTE — PHYSICAL THERAPY NOTE
PHYSICAL THERAPY EVALUATION    NAME:  Chas Varela  DATE: 03/22/21    AGE:   81 y o  Mrn:   [id number]  Length Of Stay: 0    ADMIT DX:  TIA (transient ischemic attack) [G45 9]  Dizziness [R42]    Past Medical History:   Diagnosis Date    Breast disorder     GERD (gastroesophageal reflux disease)     Intradural extramedullary spinal tumor     Legally blind in right eye, 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     Spinal meningioma ([address] )     Thyroid disorder      Past Surgical History:   Procedure Laterality Date    APPENDECTOMY      BACK SURGERY      Posterior cervical thoracic surgery with fusion for resection of T1-2 meningioma (IDEM), WHO 1    BREAST BIOPSY Right     BREAST SURGERY      GALLBLADDER SURGERY      HERNIA REPAIR      POSTERIOR CERVICAL LAMINECTOMY      ND XCAPSL CTRC RMVL INSJ IO LENS PROSTH W/O ECP Left 6/24/2019    Procedure: EXTRACTION EXTRACAPSULAR CATARACT PHACO INTRAOCULAR LENS (IOL);   Surgeon: Dejah Luna MD;  Location: [facility] MAIN OR;  Service: Ophthalmology    THYROID SURGERY      VARICOSE VEIN SURGERY         Performed at least 2 patient identifiers during session: Name, Anna Picking and ID bracelet        03/22/21 0939   PT Last Visit   PT Visit Date 03/22/21   Note Type   Note type Evaluation   Pain Assessment   Pain Assessment Tool 0-10   Pain Score No Pain   Effect of Pain on Daily Activities n/a   Hospital Pain Intervention(s) Ambulation/increased activity;Repositioned   Multiple Pain Sites No   Home Living   Type of Home Apartment   Home Layout One level;Elevator  (1st floor apt, 0 NICANOR, elevator to other floors )   Bathroom Shower/Tub Tub/shower unit   Bathroom Toilet Standard   Bathroom Equipment Grab bars in shower;Grab bars around toilet   [address] Other (Comment)  (none)   Prior Function   Level of [place] Independent with ADLs and functional mobility   Lives With Alone   Receives Help From Family;Friend(s)   ADL Assistance Independent   IADLs Independent   Falls in the last 6 months 0   Comments Pt reports living at home alone in a first floor apartment of a senior living complex  No NICANOR her apartment; mail and friends/common areas on the second floor - elevator access but pt choses to take the stairs to 2nd floor (8+8 Steps with HR)  Pt reports being completely independent with mobility and ADLs  Family completes online grocery shopping for delivery to her home or her friend takes her  Pt does not drive  Pt denies h/o falls  Good family/social support  Restrictions/Precautions   Weight Bearing Precautions Per Order No   Other Precautions Telemetry; Fall Risk;Visual impairment   General   Additional Pertinent History Pt admitted 3/21/2021 with c/o dizziness and decreased sensation of L side of face and L UE  CT (-)  Dx: TIA      Family/Caregiver Present No   Cognition   Overall Cognitive Status WFL   Arousal/Participation Alert   Orientation Level Oriented X4   Memory Within functional limits   Following Commands Follows all commands and directions without difficulty   RUE Assessment   RUE Assessment WFL   LUE Assessment   LUE Assessment WFL   RLE Assessment   RLE Assessment WFL   LLE Assessment   LLE Assessment WFL   Coordination   Movements are Fluid and Coordinated 1   Sensation WFL   Light Touch   RLE Light Touch Grossly intact   LLE Light Touch Grossly intact   Proprioception   RLE Proprioception Grossly intact   LLE Proprioception Grossly Intact   Bed Mobility   Supine to Sit 7  Independent   Sit to Supine 7  Independent   Transfers   Sit to Stand 7  Independent   Stand to Sit 7  Independent   Stand pivot 7  Independent   Ambulation/Elevation   Gait pattern WNL  (gait speed WNL for age )   Gait Assistance 7  Independent   Assistive Device None   Distance 180ft x1 including change in direction    Stair Management Assistance Not tested   Balance   Static Sitting Normal   Dynamic Sitting Normal   Static Standing Good Dynamic Standing Good   Ambulatory Good   Endurance Deficit   Endurance Deficit No   Activity Tolerance   Activity Tolerance Patient tolerated treatment well   Medical Staff Made Aware Spoke with Clair during pt rounds   Nurse Made Aware Spoke with RN [facility] pre/post session    Assessment   Prognosis Excellent   Problem List Impaired vision  (baseline impaired vision )   Assessment Pt seen for PT evaluation, cleared by RN [facility], pt on stroke pathway  Pt admitted 3/21/2021 with c/o dizziness and L UE & facial numbness, dx TIA (transient ischemic attack)  Comorbidities affecting pt's fnxl performance include: GERD, hypothyroid, R eye blindness, posterior cervicothoracic surgery with fusion due to meningioma  Personal factors affecting pt at time of PT IE include: visual impairments  PTA, pt was independent with functional mobility without assistive device, independent with ADLS, independent with IADLS, living alone in a single story home with 0 steps to enter, ambulating household distance and ambulating community distances  Pt demonstrates fnxl impairments identified in objective findings from Elderly Mobility Scale assessment, scoring 20/20, indicating independence with all aspects of ADLs & mobility  Pt scores 24/24 on AM-PAC objective tool, indicating pt demonstrates functional capacity for return to home self care vs with increased supports upon d/c  The Barthel Index outcome tool was used & pt scores 100 indicating no limitations of fnxl mobility/ADLS  Pt is at risk of falls d/t advanced age, acuity of medical illness and ongoing medical treatment of primary diagnosis  Pt's clinical presentation is currently evolving seen in pt's presentation of vital sign response, changing degree of symptoms, changing level of pain   This PT IE requires moderate complexity clinical decision making, based on multiple medical/physiological/fnxl/social factors which affect pt's performance w/ evaluation & therapist judgement for disposition recommendations  Based on pt presentation & limited impairments, pt appears to be at her baseline level of functional mobility and would most appropriately benefit from return to home self care/no needs upon d/c  No skilled PT services indicated in the acute care setting  Barriers to Discharge None   Goals   Patient Goals "to go home today"    PT Treatment Day 0   Recommendation   PT Discharge Recommendation Return to previous environment with no needs   PT - OK to Discharge Yes  (once medically cleared )   AM-PAC Basic Mobility Inpatient   Turning in Bed Without Bedrails 4   Lying on Back to Sitting on Edge of Flat Bed 4   Moving Bed to Chair 4   Standing Up From Chair 4   Walk in Room 4   Climb 3-5 Stairs 4   Basic Mobility Inpatient Raw Score 24   Basic Mobility Standardized Score 57 68   Modified [place] Scale   Modified Darline Scale 1   Barthel Index   Feeding 10   Bathing 5   Grooming Score 5   Dressing Score 10   Bladder Score 10   Bowels Score 10   Toilet Use Score 10   Transfers (Bed/Chair) Score 15   Mobility (Level Surface) Score 15   Stairs Score 10   Barthel Index Score 100       The patient's AM-PAC Basic Mobility Inpatient Short Form Raw Score is 24, Standardized Score is 57 68  A standardized score greater than 42 9 suggests the patient may benefit from discharge to home  Please also refer to the recommendation of the physical therapist for safe discharge planning      Suzette Malcolm, PT, DPT   Available via [employer]  NPI # [id number]  [place] License - [id number]  [place] License - [id number]  3/22/2021

## 2021-03-22 NOTE — CASE MANAGEMENT
Per [facility], neurology is requesting MRI be done inpatient at this time due to needing to complete stroke pathway inpatient  CM s/w Sharon at [address]  Patient can be seen at [facility] for [facility]  CM sent request to [facility] for transport for that time  CMN completed and in patient's chart for transport  CM s/w Fransico España at [facility] to cancel appointment for 4/11/2021

## 2021-03-22 NOTE — PLAN OF CARE
Problem: Neurological Deficit  Goal: Neurological status is stable or improving  Description: Interventions:  - Monitor and assess patient's level of consciousness, motor function, sensory function, and level of assistance needed for ADLs  - Monitor and report changes from baseline  Collaborate with interdisciplinary team to initiate plan and implement interventions as ordered  - Provide and maintain a safe environment  - Consider seizure precautions  - Consider fall precautions  - Consider aspiration precautions  - Consider bleeding precautions  Outcome: Progressing   NC Q 4  Problem: Activity Intolerance/Impaired Mobility  Goal: Mobility/activity is maintained at optimum level for patient  Description: Interventions:  - Assess and monitor patient  barriers to mobility and need for assistive/adaptive devices  - Assess patient's emotional response to limitations  - Collaborate with interdisciplinary team and initiate plans and interventions as ordered  - Encourage independent activity per ability   - Maintain proper body alignment  - Perform active/passive rom as tolerated/ordered    - Plan activities to conserve energy   - Turn patient as appropriate  Outcome: Progressing

## 2021-03-22 NOTE — PLAN OF CARE
Problem: Communication Impairment  Goal: Ability to express needs and understand communication  Description: Assess patient's communication skills and ability to understand information  Patient will demonstrate use of effective communication techniques, alternative methods of communication and understanding even if not able to speak  - Encourage communication and provide alternate methods of communication as needed  - Collaborate with case management/ for discharge needs  - Include patient/family/caregiver in decisions related to communication    Outcome: Progressing

## 2021-03-22 NOTE — PROGRESS NOTES
[id number]  Progress Note - Chas Varela 1935, 81 y o  female MRN: [id number]  Unit/Bed#: -Demi Encounter: [id number]  Primary Care Provider: Audrey Velez MD   Date and time admitted to hospital: 3/21/2021 12:16 PM    * TIA (transient ischemic attack)  Assessment & Plan  · Presented with dizziness since the day before admission and left sided numbness at the day of admission around 9 am    · She also reports of left ear fullness  · NIHSS 0  · No motor weakness, neurological exam is grossly benign  · Neurology was contacted by the ER  · CT of the head showed no mass effect, no acute intracranial hemorrhage  · Patient refused CTA head and neck as she stated that she could not tolerate the dye  · EKG shows sinus rhythm, no history of arrhythmia in the past   · Lipid panel showed total cholesterol 208, HDL 60, , TG 61 2  · TSH 1 4  · She was given aspirin 325 mg once followed by aspirin 81 mg      Plan:  · Continue aspirin 81 mg daily and statin 40 mg  · Will follow up with HbA1c  · MRI brain pending  · Neuro consult, appreciate recommendation     GERD (gastroesophageal reflux disease)  Assessment & Plan  · Stable    Vitamin B12 deficiency  Assessment & Plan  · Continue Vitamin B12 injections q monthly  S/P spinal fusion  Assessment & Plan  · Continue supportive care  · PT OT eval, recommend return to previous environment  VTE Pharmacologic Prophylaxis:   Pharmacologic: Enoxaparin (Lovenox)  Mechanical VTE Prophylaxis in Place: Yes    Discussions with Specialists or Other Care Team Provider: Yes    Education and Discussions with Family / Patient: Yes    Current Length of Stay: 0 day(s)    Current Patient Status: Observation     Discharge Plan / Estimated Discharge Date:  Probably tomorrow    Code Status: Level 1 - Full Code      Subjective: The patient was seen and examined at the bedside  She reports her numbness is improved    She is also reports of funny sensation in the left side of face and fullness in left ear  No difficulty with speaking, swallowing, walking  Her gait is stable  No acute event overnight  Objective:     Vitals:   Temp (24hrs), Av 8 °F (36 6 °C), Min:97 2 °F (36 2 °C), Max:98 3 °F (36 8 °C)    Temp:  [97 2 °F (36 2 °C)-98 3 °F (36 8 °C)] 98 2 °F (36 8 °C)  HR:  [57-74] 68  Resp:  [16-20] 18  BP: (114-162)/(50-88) 128/67  SpO2:  [94 %-97 %] 96 %  Body mass index is 24 99 kg/m²  Input and Output Summary (last 24 hours): Intake/Output Summary (Last 24 hours) at 3/22/2021 1533  Last data filed at 3/21/2021 1901  Gross per 24 hour   Intake --   Output 200 ml   Net -200 ml       Physical Exam:     Physical Exam  Vitals signs and nursing note reviewed  Constitutional:       General: She is not in acute distress  Appearance: Normal appearance  She is not ill-appearing  HENT:      Head: Normocephalic and atraumatic  Ears:      Comments: Right eye- Legally blind  Horizontal nystagmus in left eye     Mouth/Throat:      Mouth: Mucous membranes are moist       Pharynx: Oropharynx is clear  Comments: Uvular and tongue- middle  Eyes:      General: No scleral icterus  Conjunctiva/sclera: Conjunctivae normal    Neck:      Vascular: No carotid bruit  Comments: Surgical scar behind the neck  Cardiovascular:      Rate and Rhythm: Normal rate and regular rhythm  Pulses: Normal pulses  Heart sounds: Normal heart sounds  No murmur  Pulmonary:      Effort: Pulmonary effort is normal  No respiratory distress  Breath sounds: Normal breath sounds  No wheezing  Abdominal:      General: Abdomen is flat  Bowel sounds are normal  There is no distension  Palpations: Abdomen is soft  Tenderness: There is no abdominal tenderness  Musculoskeletal:      Right lower leg: No edema  Left lower leg: No edema  Skin:     General: Skin is warm and dry     Neurological:      General: No focal deficit present  Mental Status: She is alert and oriented to person, place, and time  Sensory: No sensory deficit  Motor: No weakness  Coordination: Coordination normal       Gait: Gait normal    Psychiatric:         Mood and Affect: Mood normal          Behavior: Behavior normal            Additional Data:     Labs:    Results from last 7 days   Lab Units 03/22/21  0515   WBC Thousand/uL 4 27*   HEMOGLOBIN g/dL 12 2   HEMATOCRIT % 36 4   PLATELETS Thousands/uL 211     Results from last 7 days   Lab Units 03/22/21  0515 03/21/21  1252   POTASSIUM mmol/L 4 0 4 0   CHLORIDE mmol/L 106 104   CO2 mmol/L 28 28   BUN mg/dL 14 18   CREATININE mg/dL 0 82 0 90   CALCIUM mg/dL 8 6 9 0   ALK PHOS U/L  --  84 0   ALT U/L  --  11   AST U/L  --  21     Results from last 7 days   Lab Units 03/21/21  1252   INR  0 94       * I Have Reviewed All Lab Data Listed Above  * Additional Pertinent Lab Tests Reviewed: [facility]    Recent Cultures (last 7 days):           Last 24 Hours Medication List:   Current Facility-Administered Medications   Medication Dose Route Frequency Provider Last Rate    aspirin  81 mg Oral Daily Randy Llamas MD      atorvastatin  40 mg Oral QPM Randy Llamas MD      enoxaparin  40 mg Subcutaneous Daily Randy Llamas MD          Today, Patient Was Seen By: Elvira Choi MD    ** Please Note: This note has been constructed using a voice recognition system   **

## 2021-03-22 NOTE — SPEECH THERAPY NOTE
Speech Language/Pathology  Speech/Language Pathology  Assessment    Patient Name: Ray Fields  ICMPV'Z Date: 3/22/2021     Problem List  Principal Problem:    TIA (transient ischemic attack)  Active Problems:    S/P spinal fusion    Blind, unilateral    Vitamin B12 deficiency    GERD (gastroesophageal reflux disease)    Past Medical History  Past Medical History:   Diagnosis Date    Breast disorder     GERD (gastroesophageal reflux disease)     Intradural extramedullary spinal tumor     Legally blind in right eye, 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     Spinal [address] [address] )     Thyroid disorder      Ray Fields is a 81 y o  female who presents with history of TIA many years ago, spinal cord tumor status post surgery 6 years ago presents with complaints of dizziness and left-sided numbness  Patient stated that she was feeling had dizzy lightheaded yesterday and today morning she woke up around 9:00 a m  She felt left-sided numbness both upper and lower extremity  Patient however had no weakness and she was able to get up and go to the bathroom and however breakfast   Patient stated that the numbness persisted and she came into the ER to be checked out  Patient denies of any headache or blurry vision  She has right eye blind  Patient denies of any chest pain or shortness of breath or palpitations  Patient denies of nausea vomiting or abdominal pain  Patient denies of any bladder or bowel incontinence  Patient denies of any fall or trauma  Patient denies of fever or chills  In the ED she had a CT of the head which showed no acute  changes and she refused to get CTA head and neck and Neurology was consulted by the ED staff and recommended to be observed here  Consult received for speech/swallow eval on stroke pathway  Pt passed nsg swallow screen; tolerating regular diet w/o s/s dysphagia or aspiration  No speech/language deficits reported  NIH score is 0      CT-head: 3/21/21 No mass effect, acute intracranial hemorrhage or CT evidence for a large acute vascular distribution infarct  Mild chronic microvascular ischemic change  No need for formal speech/swallow eval at this time  Reconsult if needed      Willard Rucker CCC-SLP  Speech Pathologist  Available via  [employer]

## 2021-03-22 NOTE — CASE MANAGEMENT
LOS 1 DAY  RISK OF UNPLANNED READMISSION SCORE N/A  30 DAY READMISSION: NO  BUNDLE: NO    Per rounding, patient needs MRI  Per SLIM, patient is able to go for MRI OP as long as MRI can be scheduled prior to discharge and as long as transportation can be secured  CM s/w patient bedside to assess  Patient states she lives alone in a senior living complex  Patient lives on 1st floor with 0 NICANOR and denies any use of DME at baseline  Patient states that she was IPTA with all ADLs  No Hx VNA, STR, MH, or SA identified  PCP: Veronique Nguyen    No POA identified at this time  Patient declined information from this CM  Preferred [facility]acy: [facility], no barriers identified to obtaining Rx from that location  Per rounding with SLIM, patient may be able to go for MRI OP  CM s/w patient bedside who reports that her neighbor or brother will provide transportation as long as MRI can be scheduled at [facility]  CM s/w Graeme at [address]  No MRI availability until late this evening  Jenlucy Maria stating central scheduling must be called if MRI is to be arranged OP  CM s/w Radha Maldonado at [facility] to schedule  Earliest MRI appointment available is for 4/11/2021 at 1630  CM confirmed appointment with Radha Maldonado and sent TT to [facility] regarding this date  SLIM to s/w Neurology to confirm that this is ok  Heather Craft reports that her neighbor will transport home for discharge if discharged today  CM will continue to follow

## 2021-03-23 ENCOUNTER — APPOINTMENT (INPATIENT)
Dept: NON INVASIVE DIAGNOSTICS | Facility: HOSPITAL | Age: 86
DRG: 069 | End: 2021-03-23
Payer: COMMERCIAL

## 2021-03-23 VITALS
HEIGHT: 61 IN | BODY MASS INDEX: 24.97 KG/M2 | SYSTOLIC BLOOD PRESSURE: 145 MMHG | TEMPERATURE: 97.9 F | RESPIRATION RATE: 16 BRPM | OXYGEN SATURATION: 97 % | HEART RATE: 63 BPM | WEIGHT: 132.28 LBS | DIASTOLIC BLOOD PRESSURE: 59 MMHG

## 2021-03-23 PROCEDURE — 93306 TTE W/DOPPLER COMPLETE: CPT

## 2021-03-23 PROCEDURE — 99239 HOSP IP/OBS DSCHRG MGMT >30: CPT | Performed by: INTERNAL MEDICINE

## 2021-03-23 PROCEDURE — 93306 TTE W/DOPPLER COMPLETE: CPT | Performed by: INTERNAL MEDICINE

## 2021-03-23 RX ORDER — ATORVASTATIN CALCIUM 20 MG/1
20 TABLET, FILM COATED ORAL DAILY
Qty: 30 TABLET | Refills: 0 | Status: SHIPPED | OUTPATIENT
Start: 2021-03-23

## 2021-03-23 RX ORDER — ASPIRIN 81 MG/1
81 TABLET, CHEWABLE ORAL DAILY
Qty: 30 TABLET | Refills: 2 | Status: SHIPPED | OUTPATIENT
Start: 2021-03-24 | End: 2022-07-01

## 2021-03-23 RX ADMIN — ENOXAPARIN SODIUM 40 MG: 40 INJECTION SUBCUTANEOUS at 09:57

## 2021-03-23 RX ADMIN — ASPIRIN 81 MG: 81 TABLET, CHEWABLE ORAL at 09:57

## 2021-03-23 NOTE — DISCHARGE SUMMARY
Vera U  67   Discharge- Honorlucy Moratayax 1935, 81 y o  female MRN: [id number]  Unit/Bed#: -01 Encounter: [id number]  Primary Care Provider: Vivi Paula MD   Date and time admitted to hospital: 3/21/2021 12:16 PM    * TIA (transient ischemic attack)  Assessment & Plan  · Suspected TIA  · Presented with dizziness since the day before admission and left sided numbness at the day of admission around 9 am    · She also reports of left ear fullness  · NIHSS 0  · No motor weakness, neurological exam is grossly benign except horizontal nystagmus  · Neurology was contacted by the ER  · CT of the head showed no mass effect, no acute intracranial hemorrhage  · Patient refused CTA head and neck as she stated that she could not tolerate the dye  · EKG shows sinus rhythm, no history of arrhythmia in the past   · Lipid panel showed total cholesterol 208, HDL 60, , TG 61 2  · TSH 1 4  HbA1C 5 6%  · She was given aspirin 325 mg once followed by aspirin 81 mg    · MRI brain: White matter changes suggestive of chronic microangiopathy  No acute intracranial pathology  · MRA brain: No intracranial stenosis, vessel occlusion or aneurysm  Plan:  · Continue aspirin 81 mg daily and statin 20 mg  · Neuro consult, appreciate recommendation     GERD (gastroesophageal reflux disease)  Assessment & Plan  · Stable    Vitamin B12 deficiency  Assessment & Plan  · Continue Vitamin B12 injections q monthly  S/P spinal fusion  Assessment & Plan  · Continue supportive care  · PT OT eval, recommend return to previous environment         Discharging Physician / Practitioner: Antonietta Curry MD  PCP: Vivi Paula MD  Admission Date:   Admission Orders (From admission, onward)     Ordered        03/22/21 1550  Inpatient Admission  Once         03/21/21 1333  Place in Observation  Once                   Discharge Date: 03/23/21    Resolved Problems  Date Reviewed: 3/23/2021    None          Consultations During Hospital Stay:  · Neurology    Procedures Performed:   · None    Significant Findings / Test Results:   · 3/21/2021: CT stroke:  No mass effect, acute intracranial hemorrhage or CT evidence for a large acute vascular distribution infarct  Mild chronic microvascular ischemic change  · 3/21/2021: CXR No acute cardiopulmonary disease  Biapical pleural-parenchymal scarring  · 3/22/2021: VAS carotid doppler  Right :<50% stenosis noted in the internal carotid artery  Plaque is heterogenous and smooth  · 3/22/2021: MRI brain There is a right posterior frontal 1 0 cm extra-axial dural-based mass that is calcified on CT and most consistent with meningioma  White matter changes suggestive of chronic microangiopathy  No acute intracranial pathology  · 3/22/2021: MRA head and neck: White matter changes suggestive of chronic microangiopathy  No acute intracranial pathology  · 3/23/2021: TTE EF 55%, G1DD, mild MR, mild ME, mild TR    Incidental Findings:   · As above    Test Results Pending at Discharge (will require follow up): · None     Outpatient Tests Requested:  · None    Complications:  None    Reason for Admission: Dizziness and left sided numbness, Rule out CVA    Hospital Course:     Stephanie Rocha is a 81 y o  female patient with history of TIA many years ago, spinal cord tumor status post surgery 6 years ago who originally presented to the hospital on 3/21/2021 due to dizziness and left-sided numbness  She felt left-sided numbness both upper and lower extremity, also reported left ear fullness and some what neck stiffness  In the ED she had a CT of the head which showed no acute  changes and she refused to get CTA head and neck  Neurology was contacted by ED and recommended to observe here  She was given aspirin 325 mg and followed by aspirin 81 mg daily  Also initiated lipitor 40 mg daily  Initial evaluation revealed vital signs are stable  CBC, CMP are unremarkable    Lipid panel showed cholesterol 208, triglycerides 61 2, HDL 60,   Troponin negative, COVID-19 negative, UA unremarkable, TSH 1 4, HbA1c 5 6%  She reports her symptoms are improving spontaneously  MRI MRA of head and neck demonstrated no acute intracranial pathology  She was hemodynamically and clinically stable before discharge  She was advised to take aspirin 81 mg, Lipitor 20 mg daily  She was advised to follow-up with primary care physician in 1 week after hospital discharge  Please see above list of diagnoses and related plan for additional information  Condition at Discharge: good     Discharge Day Visit / Exam:     Subjective:  She was seen and examined at the bedside  She reports her symptoms are kind of resolved  She reports no new complaint  She reports she is ready to go home  No acute event overnight  Vitals: Blood Pressure: 145/59 (03/23/21 0707)  Pulse: 63 (03/23/21 0707)  Temperature: 97 9 °F (36 6 °C) (03/23/21 0707)  Temp Source: Tympanic (03/23/21 0707)  Respirations: 16 (03/23/21 0707)  Height: 5' 1" (154 9 cm) (03/21/21 1537)  Weight - Scale: 60 kg (132 lb 4 4 oz) (03/21/21 1537)  SpO2: 97 % (03/23/21 0707)  Exam:   Physical Exam  Vitals signs and nursing note reviewed  Constitutional:       General: She is not in acute distress  Appearance: Normal appearance  She is well-developed  She is not ill-appearing  Comments: Very pleasant elderly lady is lying on the bed comfortably  HENT:      Head: Normocephalic and atraumatic  Eyes:      Conjunctiva/sclera: Conjunctivae normal    Neck:      Musculoskeletal: Neck supple  Cardiovascular:      Rate and Rhythm: Normal rate and regular rhythm  Pulses: Normal pulses  Heart sounds: Normal heart sounds  No murmur  No gallop  Pulmonary:      Effort: Pulmonary effort is normal  No respiratory distress  Breath sounds: Normal breath sounds  Abdominal:      General: Bowel sounds are normal  There is no distension  Palpations: Abdomen is soft  Tenderness: There is no abdominal tenderness  Musculoskeletal:      Right lower leg: No edema  Left lower leg: No edema  Skin:     General: Skin is warm and dry  Capillary Refill: Capillary refill takes less than 2 seconds  Neurological:      General: No focal deficit present  Mental Status: She is alert and oriented to person, place, and time  Psychiatric:         Mood and Affect: Mood normal          Behavior: Behavior normal          Discussion with Family:  Discussed with the patient above plans  She is agreeable to it  Discharge instructions/Information to patient and family:   See after visit summary for information provided to patient and family  Provisions for Follow-Up Care:  See after visit summary for information related to follow-up care and any pertinent home health orders  Disposition:     Home    For Discharges to [facility] SNF:   · Not Applicable to this Patient - Not Applicable to this Patient    Planned Readmission: No     Discharge Statement:  I spent 35 minutes discharging the patient  This time was spent on the day of discharge  I had direct contact with the patient on the day of discharge  Greater than 50% of the total time was spent examining patient, answering all patient questions, arranging and discussing plan of care with patient as well as directly providing post-discharge instructions  Additional time then spent on discharge activities  Discharge Medications:  See after visit summary for reconciled discharge medications provided to patient and family        ** Please Note: This note has been constructed using a voice recognition system **

## 2021-03-23 NOTE — ASSESSMENT & PLAN NOTE
· Suspected TIA  · Presented with dizziness since the day before admission and left sided numbness at the day of admission around 9 am    · She also reports of left ear fullness  · NIHSS 0  · No motor weakness, neurological exam is grossly benign except horizontal nystagmus  · Neurology was contacted by the ER  · CT of the head showed no mass effect, no acute intracranial hemorrhage  · Patient refused CTA head and neck as she stated that she could not tolerate the dye  · EKG shows sinus rhythm, no history of arrhythmia in the past   · Lipid panel showed total cholesterol 208, HDL 60, , TG 61 2  · TSH 1 4  HbA1C 5 6%  · She was given aspirin 325 mg once followed by aspirin 81 mg    · MRI brain: White matter changes suggestive of chronic microangiopathy  No acute intracranial pathology  · MRA brain: No intracranial stenosis, vessel occlusion or aneurysm       Plan:  · Continue aspirin 81 mg daily and statin 20 mg  · Neuro consult, appreciate recommendation

## 2021-03-23 NOTE — NURSING NOTE
Patient educated about CVA, risks and signs and symptoms  Patient encouraged to return if symptoms return  Patient expressed understanding

## 2021-03-23 NOTE — DISCHARGE INSTR - AVS FIRST PAGE
DISCHARGE INSTRUCTIONS   1  Follow up with Dr Hermann Newberry MD in one week  in regards to recent hospitalization  2  Take medications regularly  New medication:-  Aspirin 81 mg daily  Lipitor 20 mg daily at dinner  3  Come back to the ER if symptoms recur or worsen

## 2021-03-24 NOTE — UTILIZATION REVIEW
Additional clinical as requested:         3/22 MRI BRAIN WITHOUT CONTRAST     INDICATION: G45 9: Transient cerebral ischemic attack, unspecified  Dizziness  Left-sided numbness      COMPARISON:   CT dated 3/21/2021     TECHNIQUE:  Sagittal T1, axial T2, axial FLAIR, axial T1, axial [place] and axial diffusion imaging      IMAGE QUALITY:  Diagnostic      FINDINGS:     BRAIN PARENCHYMA: There is a right posterior frontal 1 0 cm extra-axial dural-based mass that is calcified on CT and most consistent with meningioma  There is no intra-axial mass, mass effect or midline shift  There is no intracranial hemorrhage  There   is no evidence of acute infarction and diffusion imaging is unremarkable  Small scattered hyperintensities on T2/FLAIR imaging are noted in the periventricular and subcortical white matter demonstrating an appearance that is statistically most likely to   represent mild microangiopathic change      VENTRICLES:  Normal for the patient's age      SELLA AND PITUITARY GLAND:  Normal      ORBITS:  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      PARANASAL SINUSES:  Mild mucosal thickening in the right maxillary sinus      VASCULATURE:  Evaluation of the major intracranial vasculature demonstrates appropriate flow voids      CALVARIUM AND SKULL BASE:  Normal      EXTRACRANIAL SOFT TISSUES:  Normal      IMPRESSION:     White matter changes suggestive of chronic microangiopathy    No acute intracranial pathology       ==========================================================    3/22 MRA brain without contrast      INDICATION:  G45 9: Transient cerebral ischemic attack, unspecified     COMPARISON:  [facility]cordant MRI of the brain      TECHNIQUE:  Axial 3-D time-of-flight imaging with 3-D reconstructions performed without contrast         IV Contrast:  Not administered      FINDINGS:     IMAGE QUALITY:  Diagnostic      ANATOMY     INTERNAL CAROTID ARTERIES:  No stenosis of the distal cervical or intracranial internal carotid arteries  1 5 mm focal outpouching from the median segment of the left ICA represents infundibulum of hypoplastic P-comm  No aneurysm      ANTERIOR CIRCULATION:  Normal A1 segments  Normal anterior communicating artery  Normal flow-related signal of the anterior cerebral arteries      MIDDLE CEREBRAL ARTERY CIRCULATION:  The M1 segment and middle cerebral artery branches demonstrate normal flow-related signal      DISTAL VERTEBRAL ARTERIES:  Distal vertebral arteries are patient with a normal vertebrobasilar junction  Patent right posterior inferior cerebellar artery is incompletely imaged  Origin of the right vertebral artery is not included that arises more   proximally  The left posterior inferior cerebellar artery origin is normal       BASILAR ARTERY:  Normal      POSTERIOR CEREBRAL ARTERIES:  Both posterior cerebral arteries arises from the basilar tip  Both arteries demonstrate normal flow-related enhancement  Posterior communicating arteries are hypoplastic      IMPRESSION:     No intracranial stenosis, vessel occlusion or aneurysm     ==================================================================    3/22 Carotid study:      Impression  RIGHT:  There is <50% stenosis noted in the internal carotid artery  Plaque is  heterogenous and smooth  Vertebral artery flow is antegrade  There is no significant subclavian artery disease  LEFT:  There is no evidence of arterial disease throughout the extracranial carotid  system  Vertebral artery flow is antegrade    There is no significant subclavian artery disease     ========================================================================   Study date:  23-Mar-2021     Transthoracic Echocardiogram  2D, M-mode, Doppler, and Color Doppler       Patient: Anand Ramirez  MR number: [id number]  Account number: [de-identified]  : 1935  Age: 81 years  Gender: Female  Status: Inpatient  Location: [facility]  Height: 61 in  Weight: 132 lb  BP: 145/ 59 mmHg     Indications: TIA     Diagnoses: G45 9 - Transient cerebral ischemic attack, unspecified     Sonographer:  SABRINA Pratt  Referring Physician:  Wong Raymundo MD  Group:  [facility]  Interpreting Physician:  Jah Francisco DO     SUMMARY     LEFT VENTRICLE:  Systolic function was normal  Ejection fraction was estimated to be 55 %  There were no regional wall motion abnormalities  Wall thickness was at the upper limits of normal   Doppler parameters were consistent with abnormal left ventricular relaxation (grade 1 diastolic dysfunction)      RIGHT VENTRICLE:  The size was normal   Systolic function was normal      MITRAL VALVE:  There was mild annular calcification  There was mild regurgitation      AORTIC VALVE:  There was mild regurgitation      TRICUSPID VALVE:  There was mild regurgitation      PULMONIC VALVE:  There was mild regurgitation      HISTORY: PRIOR HISTORY: Patient has no history of cardiovascular disease      PROCEDURE: The study was performed in the [facility]  This was a routine study  The transthoracic approach was used  The study included complete 2D imaging, M-mode, complete spectral Doppler, and color Doppler  The heart rate was 66  bpm, at the start of the study  Images were obtained from the parasternal, apical, subcostal, and suprasternal notch acoustic windows  Image quality was adequate      LEFT VENTRICLE: Size was normal  Systolic function was normal  Ejection fraction was estimated to be 55 %  There were no regional wall motion abnormalities   Wall thickness was at the upper limits of normal  DOPPLER: Doppler parameters were  consistent with abnormal left ventricular relaxation (grade 1 diastolic dysfunction)      RIGHT VENTRICLE: The size was normal  Systolic function was normal  Wall thickness was normal      LEFT ATRIUM: Size was normal      RIGHT ATRIUM: Size was normal      MITRAL VALVE: There was mild annular calcification  There was normal leaflet separation  DOPPLER: The transmitral velocity was within the normal range  There was no evidence for stenosis  There was mild regurgitation      AORTIC VALVE: The valve was trileaflet  Leaflets exhibited normal thickness and normal cuspal separation  DOPPLER: Transaortic velocity was within the normal range  There was no evidence for stenosis  There was mild regurgitation      TRICUSPID VALVE: The valve structure was normal  There was normal leaflet separation  DOPPLER: The transtricuspid velocity was within the normal range  There was no evidence for stenosis  There was mild regurgitation  Pulmonary artery  systolic pressure was at the upper limits of normal      PULMONIC VALVE: Leaflets exhibited normal thickness, no calcification, and normal cuspal separation  DOPPLER: The transpulmonic velocity was within the normal range  There was mild regurgitation      PERICARDIUM: There was no pericardial effusion  The pericardium was normal in appearance      AORTA: The root exhibited normal size      SYSTEMIC VEINS: IVC: The inferior vena cava was mildly dilated   Respirophasic changes were normal

## 2021-03-26 ENCOUNTER — TELEPHONE (OUTPATIENT)
Dept: NEUROLOGY | Facility: CLINIC | Age: 86
End: 2021-03-26

## 2021-03-26 NOTE — TELEPHONE ENCOUNTER
1ST ATTEMPT LMOM for pt to call in to sched HFU appt    SLRA/Abnormal Sensaton/[employer]    NOTE FROM CHART:  Reason for Consult / Principal Problem: abnormal sensation

## 2021-03-29 NOTE — TELEPHONE ENCOUNTER
Spoke to pt, she states she is doing so much better and has a follow up with her primary dr next week  She states no need for Neurology HFU appt  I advised her if she changes her mind to give the office a call to sched HFU appt      SLRA/Abnormal Sensaton/[employer]

## 2021-10-08 ENCOUNTER — APPOINTMENT (EMERGENCY)
Dept: CT IMAGING | Facility: HOSPITAL | Age: 86
End: 2021-10-08
Payer: COMMERCIAL

## 2021-10-08 ENCOUNTER — APPOINTMENT (EMERGENCY)
Dept: RADIOLOGY | Facility: HOSPITAL | Age: 86
End: 2021-10-08
Payer: COMMERCIAL

## 2021-10-08 ENCOUNTER — HOSPITAL ENCOUNTER (EMERGENCY)
Facility: HOSPITAL | Age: 86
Discharge: HOME | End: 2021-10-08
Attending: EMERGENCY MEDICINE
Payer: COMMERCIAL

## 2021-10-08 VITALS
HEART RATE: 68 BPM | DIASTOLIC BLOOD PRESSURE: 74 MMHG | SYSTOLIC BLOOD PRESSURE: 142 MMHG | OXYGEN SATURATION: 98 % | WEIGHT: 147.71 LBS | HEIGHT: 61 IN | RESPIRATION RATE: 20 BRPM | TEMPERATURE: 98 F | BODY MASS INDEX: 27.89 KG/M2

## 2021-10-08 DIAGNOSIS — S80.02XA CONTUSION OF LEFT KNEE: ICD-10-CM

## 2021-10-08 DIAGNOSIS — S05.12XA: ICD-10-CM

## 2021-10-08 DIAGNOSIS — S80.01XA CONTUSION OF KNEE, RIGHT: Primary | ICD-10-CM

## 2021-10-08 PROCEDURE — 99284 EMERGENCY DEPT VISIT MOD MDM: CPT

## 2021-10-08 PROCEDURE — 70486 CT MAXILLOFACIAL W/O DYE: CPT

## 2021-10-08 PROCEDURE — 72125 CT NECK SPINE W/O DYE: CPT

## 2021-10-08 PROCEDURE — 73564 X-RAY EXAM KNEE 4 OR MORE: CPT

## 2021-10-08 PROCEDURE — 99284 EMERGENCY DEPT VISIT MOD MDM: CPT | Performed by: EMERGENCY MEDICINE

## 2021-10-08 PROCEDURE — 70450 CT HEAD/BRAIN W/O DYE: CPT

## 2021-11-15 ENCOUNTER — APPOINTMENT (OUTPATIENT)
Dept: LAB | Facility: HOSPITAL | Age: 86
End: 2021-11-15
Payer: COMMERCIAL

## 2021-11-15 DIAGNOSIS — I10 HYPERTENSION, ESSENTIAL: ICD-10-CM

## 2021-11-15 DIAGNOSIS — R74.01 NONSPECIFIC ELEVATION OF LEVELS OF TRANSAMINASE OR LACTIC ACID DEHYDROGENASE (LDH): ICD-10-CM

## 2021-11-15 DIAGNOSIS — E78.5 HYPERLIPIDEMIA, UNSPECIFIED HYPERLIPIDEMIA TYPE: ICD-10-CM

## 2021-11-15 DIAGNOSIS — E03.9 HYPOTHYROIDISM, UNSPECIFIED TYPE: ICD-10-CM

## 2021-11-15 DIAGNOSIS — R74.02 NONSPECIFIC ELEVATION OF LEVELS OF TRANSAMINASE OR LACTIC ACID DEHYDROGENASE (LDH): ICD-10-CM

## 2021-11-15 LAB
ALT SERPL W P-5'-P-CCNC: 14 U/L (ref 5–54)
ANION GAP SERPL CALCULATED.3IONS-SCNC: 6 MMOL/L (ref 4–13)
AST SERPL W P-5'-P-CCNC: 19 U/L (ref 15–41)
BUN SERPL-MCNC: 14 MG/DL (ref 6–20)
CALCIUM SERPL-MCNC: 8.9 MG/DL (ref 8.4–10.2)
CHLORIDE SERPL-SCNC: 104 MMOL/L (ref 96–108)
CHOLEST SERPL-MCNC: 162 MG/DL
CO2 SERPL-SCNC: 29 MMOL/L (ref 22–33)
CREAT SERPL-MCNC: 0.97 MG/DL (ref 0.4–1.1)
ERYTHROCYTE [DISTWIDTH] IN BLOOD BY AUTOMATED COUNT: 13.3 % (ref 11.6–15.1)
GFR SERPL CREATININE-BSD FRML MDRD: 53 ML/MIN/1.73SQ M
GLUCOSE P FAST SERPL-MCNC: 99 MG/DL (ref 70–105)
HCT VFR BLD AUTO: 38.2 % (ref 34.8–46.1)
HDLC SERPL-MCNC: 63 MG/DL
HGB BLD-MCNC: 12.7 G/DL (ref 11.5–15.4)
LDLC SERPL CALC-MCNC: 84 MG/DL (ref 0–100)
MCH RBC QN AUTO: 29.3 PG (ref 26.8–34.3)
MCHC RBC AUTO-ENTMCNC: 33.2 G/DL (ref 31.4–37.4)
MCV RBC AUTO: 88 FL (ref 82–98)
PLATELET # BLD AUTO: 227 THOUSANDS/UL (ref 149–390)
PMV BLD AUTO: 10.9 FL (ref 8.9–12.7)
POTASSIUM SERPL-SCNC: 4.6 MMOL/L (ref 3.5–5)
RBC # BLD AUTO: 4.33 MILLION/UL (ref 3.81–5.12)
SODIUM SERPL-SCNC: 139 MMOL/L (ref 133–145)
TRIGL SERPL-MCNC: 75.9 MG/DL
TSH SERPL DL<=0.05 MIU/L-ACNC: 1.49 UIU/ML (ref 0.34–5.6)
WBC # BLD AUTO: 4.98 THOUSAND/UL (ref 4.31–10.16)

## 2021-11-15 PROCEDURE — 80048 BASIC METABOLIC PNL TOTAL CA: CPT

## 2021-11-15 PROCEDURE — 84460 ALANINE AMINO (ALT) (SGPT): CPT

## 2021-11-15 PROCEDURE — 84450 TRANSFERASE (AST) (SGOT): CPT

## 2021-11-15 PROCEDURE — 85027 COMPLETE CBC AUTOMATED: CPT

## 2021-11-15 PROCEDURE — 80061 LIPID PANEL: CPT

## 2021-11-15 PROCEDURE — 36415 COLL VENOUS BLD VENIPUNCTURE: CPT

## 2021-11-15 PROCEDURE — 84443 ASSAY THYROID STIM HORMONE: CPT

## 2021-12-18 ENCOUNTER — HOSPITAL ENCOUNTER (EMERGENCY)
Facility: HOSPITAL | Age: 86
Discharge: HOME | End: 2021-12-18
Attending: EMERGENCY MEDICINE | Admitting: EMERGENCY MEDICINE
Payer: COMMERCIAL

## 2021-12-18 VITALS
HEART RATE: 74 BPM | HEIGHT: 61 IN | OXYGEN SATURATION: 97 % | SYSTOLIC BLOOD PRESSURE: 128 MMHG | TEMPERATURE: 98.5 F | WEIGHT: 123.9 LBS | DIASTOLIC BLOOD PRESSURE: 59 MMHG | RESPIRATION RATE: 16 BRPM | BODY MASS INDEX: 23.39 KG/M2

## 2021-12-18 DIAGNOSIS — R11.0 NAUSEA: Primary | ICD-10-CM

## 2021-12-18 DIAGNOSIS — B34.9 ACUTE VIRAL SYNDROME: ICD-10-CM

## 2021-12-18 DIAGNOSIS — R53.83 FATIGUE: ICD-10-CM

## 2021-12-18 LAB
ALBUMIN SERPL BCP-MCNC: 3.7 G/DL (ref 3.5–5)
ALP SERPL-CCNC: 107 U/L (ref 46–116)
ALT SERPL W P-5'-P-CCNC: 19 U/L (ref 12–78)
ANION GAP SERPL CALCULATED.3IONS-SCNC: 11 MMOL/L (ref 4–13)
AST SERPL W P-5'-P-CCNC: 26 U/L (ref 5–45)
BASOPHILS # BLD AUTO: 0.02 THOUSANDS/ΜL (ref 0–0.1)
BASOPHILS NFR BLD AUTO: 0 % (ref 0–1)
BILIRUB SERPL-MCNC: 1.11 MG/DL (ref 0.2–1)
BUN SERPL-MCNC: 13 MG/DL (ref 5–25)
CALCIUM SERPL-MCNC: 8.9 MG/DL (ref 8.3–10.1)
CARDIAC TROPONIN I PNL SERPL HS: 2 NG/L
CHLORIDE SERPL-SCNC: 103 MMOL/L (ref 100–108)
CO2 SERPL-SCNC: 26 MMOL/L (ref 21–32)
CREAT SERPL-MCNC: 1.11 MG/DL (ref 0.6–1.3)
EOSINOPHIL # BLD AUTO: 0.1 THOUSAND/ΜL (ref 0–0.61)
EOSINOPHIL NFR BLD AUTO: 2 % (ref 0–6)
ERYTHROCYTE [DISTWIDTH] IN BLOOD BY AUTOMATED COUNT: 13.2 % (ref 11.6–15.1)
FLUAV RNA RESP QL NAA+PROBE: NEGATIVE
FLUBV RNA RESP QL NAA+PROBE: NEGATIVE
GFR SERPL CREATININE-BSD FRML MDRD: 45 ML/MIN/1.73SQ M
GLUCOSE SERPL-MCNC: 111 MG/DL (ref 65–140)
GLUCOSE SERPL-MCNC: 95 MG/DL (ref 65–140)
HCT VFR BLD AUTO: 38.5 % (ref 34.8–46.1)
HGB BLD-MCNC: 12.8 G/DL (ref 11.5–15.4)
IMM GRANULOCYTES # BLD AUTO: 0.01 THOUSAND/UL (ref 0–0.2)
IMM GRANULOCYTES NFR BLD AUTO: 0 % (ref 0–2)
LIPASE SERPL-CCNC: 42 U/L (ref 73–393)
LYMPHOCYTES # BLD AUTO: 2.06 THOUSANDS/ΜL (ref 0.6–4.47)
LYMPHOCYTES NFR BLD AUTO: 33 % (ref 14–44)
MCH RBC QN AUTO: 29.6 PG (ref 26.8–34.3)
MCHC RBC AUTO-ENTMCNC: 33.2 G/DL (ref 31.4–37.4)
MCV RBC AUTO: 89 FL (ref 82–98)
MONOCYTES # BLD AUTO: 0.48 THOUSAND/ΜL (ref 0.17–1.22)
MONOCYTES NFR BLD AUTO: 8 % (ref 4–12)
NEUTROPHILS # BLD AUTO: 3.49 THOUSANDS/ΜL (ref 1.85–7.62)
NEUTS SEG NFR BLD AUTO: 57 % (ref 43–75)
NRBC BLD AUTO-RTO: 0 /100 WBCS
PLATELET # BLD AUTO: 221 THOUSANDS/UL (ref 149–390)
PMV BLD AUTO: 11.1 FL (ref 8.9–12.7)
POTASSIUM SERPL-SCNC: 3.8 MMOL/L (ref 3.5–5.3)
PROT SERPL-MCNC: 7.2 G/DL (ref 6.4–8.2)
RBC # BLD AUTO: 4.33 MILLION/UL (ref 3.81–5.12)
RSV RNA RESP QL NAA+PROBE: NEGATIVE
SARS-COV-2 RNA RESP QL NAA+PROBE: NEGATIVE
SODIUM SERPL-SCNC: 140 MMOL/L (ref 136–145)
WBC # BLD AUTO: 6.16 THOUSAND/UL (ref 4.31–10.16)

## 2021-12-18 PROCEDURE — 83690 ASSAY OF LIPASE: CPT | Performed by: EMERGENCY MEDICINE

## 2021-12-18 PROCEDURE — 99284 EMERGENCY DEPT VISIT MOD MDM: CPT | Performed by: EMERGENCY MEDICINE

## 2021-12-18 PROCEDURE — 84484 ASSAY OF TROPONIN QUANT: CPT | Performed by: EMERGENCY MEDICINE

## 2021-12-18 PROCEDURE — 85025 COMPLETE CBC W/AUTO DIFF WBC: CPT | Performed by: EMERGENCY MEDICINE

## 2021-12-18 PROCEDURE — 82948 REAGENT STRIP/BLOOD GLUCOSE: CPT

## 2021-12-18 PROCEDURE — 0241U HB NFCT DS VIR RESP RNA 4 TRGT: CPT | Performed by: EMERGENCY MEDICINE

## 2021-12-18 PROCEDURE — 80053 COMPREHEN METABOLIC PANEL: CPT | Performed by: EMERGENCY MEDICINE

## 2021-12-18 PROCEDURE — 36415 COLL VENOUS BLD VENIPUNCTURE: CPT

## 2021-12-18 PROCEDURE — 93005 ELECTROCARDIOGRAM TRACING: CPT

## 2021-12-18 PROCEDURE — 99283 EMERGENCY DEPT VISIT LOW MDM: CPT

## 2021-12-19 LAB
ATRIAL RATE: 73 BPM
P AXIS: 48 DEGREES
PR INTERVAL: 110 MS
QRS AXIS: 43 DEGREES
QRSD INTERVAL: 72 MS
QT INTERVAL: 380 MS
QTC INTERVAL: 410 MS
T WAVE AXIS: 18 DEGREES
VENTRICULAR RATE: 70 BPM

## 2021-12-19 PROCEDURE — 93010 ELECTROCARDIOGRAM REPORT: CPT | Performed by: INTERNAL MEDICINE

## 2022-02-05 ENCOUNTER — APPOINTMENT (OUTPATIENT)
Dept: LAB | Facility: HOSPITAL | Age: 87
End: 2022-02-05
Payer: COMMERCIAL

## 2022-02-05 ENCOUNTER — HOSPITAL ENCOUNTER (OUTPATIENT)
Dept: MAMMOGRAPHY | Facility: HOSPITAL | Age: 87
Discharge: HOME | End: 2022-02-05
Payer: COMMERCIAL

## 2022-02-05 DIAGNOSIS — E78.5 HYPERLIPOPROTEINEMIA: ICD-10-CM

## 2022-02-05 DIAGNOSIS — Z12.31 OTHER SCREENING MAMMOGRAM: ICD-10-CM

## 2022-02-05 DIAGNOSIS — Z12.31 ENCOUNTER FOR SCREENING MAMMOGRAM FOR MALIGNANT NEOPLASM OF BREAST: ICD-10-CM

## 2022-02-05 LAB
CHOLEST SERPL-MCNC: 151 MG/DL
HDLC SERPL-MCNC: 58 MG/DL
LDLC SERPL CALC-MCNC: 79 MG/DL (ref 0–100)
NONHDLC SERPL-MCNC: 93 MG/DL
TRIGL SERPL-MCNC: 68.1 MG/DL

## 2022-02-05 PROCEDURE — 77063 BREAST TOMOSYNTHESIS BI: CPT

## 2022-02-05 PROCEDURE — 77067 SCR MAMMO BI INCL CAD: CPT

## 2022-02-05 PROCEDURE — 80061 LIPID PANEL: CPT

## 2022-06-30 ENCOUNTER — APPOINTMENT (EMERGENCY)
Dept: CT IMAGING | Facility: HOSPITAL | Age: 87
End: 2022-06-30
Payer: COMMERCIAL

## 2022-06-30 ENCOUNTER — HOSPITAL ENCOUNTER (OUTPATIENT)
Facility: HOSPITAL | Age: 87
Setting detail: OBSERVATION
Discharge: HOME | End: 2022-07-01
Attending: EMERGENCY MEDICINE | Admitting: INTERNAL MEDICINE
Payer: COMMERCIAL

## 2022-06-30 DIAGNOSIS — E04.1 THYROID NODULE: ICD-10-CM

## 2022-06-30 DIAGNOSIS — G45.9 TIA (TRANSIENT ISCHEMIC ATTACK): ICD-10-CM

## 2022-06-30 DIAGNOSIS — I63.9 STROKE (CEREBRUM) (HCC): Primary | ICD-10-CM

## 2022-06-30 PROBLEM — D32.9 MENINGIOMA (HCC): Status: ACTIVE | Noted: 2022-06-30

## 2022-06-30 LAB
ALBUMIN SERPL BCP-MCNC: 4 G/DL (ref 3.5–5)
ALP SERPL-CCNC: 87 U/L (ref 34–104)
ALT SERPL W P-5'-P-CCNC: 11 U/L (ref 7–52)
ANION GAP SERPL CALCULATED.3IONS-SCNC: 8 MMOL/L (ref 4–13)
APTT PPP: 26 SECONDS (ref 23–37)
AST SERPL W P-5'-P-CCNC: 19 U/L (ref 13–39)
BILIRUB SERPL-MCNC: 0.75 MG/DL (ref 0.2–1)
BUN SERPL-MCNC: 18 MG/DL (ref 5–25)
CALCIUM SERPL-MCNC: 8.8 MG/DL (ref 8.4–10.2)
CARDIAC TROPONIN I PNL SERPL HS: 3 NG/L
CHLORIDE SERPL-SCNC: 101 MMOL/L (ref 96–108)
CO2 SERPL-SCNC: 29 MMOL/L (ref 21–32)
CREAT SERPL-MCNC: 0.96 MG/DL (ref 0.6–1.3)
ERYTHROCYTE [DISTWIDTH] IN BLOOD BY AUTOMATED COUNT: 13.1 % (ref 11.6–15.1)
FLUAV RNA RESP QL NAA+PROBE: NEGATIVE
FLUBV RNA RESP QL NAA+PROBE: NEGATIVE
GFR SERPL CREATININE-BSD FRML MDRD: 53 ML/MIN/1.73SQ M
GLUCOSE SERPL-MCNC: 103 MG/DL (ref 65–140)
GLUCOSE SERPL-MCNC: 93 MG/DL (ref 65–140)
HCT VFR BLD AUTO: 39.4 % (ref 34.8–46.1)
HGB BLD-MCNC: 12.9 G/DL (ref 11.5–15.4)
INR PPP: 1 (ref 0.84–1.19)
MCH RBC QN AUTO: 29.2 PG (ref 26.8–34.3)
MCHC RBC AUTO-ENTMCNC: 32.7 G/DL (ref 31.4–37.4)
MCV RBC AUTO: 89 FL (ref 82–98)
PLATELET # BLD AUTO: 245 THOUSANDS/UL (ref 149–390)
PMV BLD AUTO: 11.4 FL (ref 8.9–12.7)
POTASSIUM SERPL-SCNC: 4.3 MMOL/L (ref 3.5–5.3)
PROT SERPL-MCNC: 7 G/DL (ref 6.4–8.4)
PROTHROMBIN TIME: 13.1 SECONDS (ref 11.6–14.5)
RBC # BLD AUTO: 4.42 MILLION/UL (ref 3.81–5.12)
RSV RNA RESP QL NAA+PROBE: NEGATIVE
SARS-COV-2 RNA RESP QL NAA+PROBE: NEGATIVE
SODIUM SERPL-SCNC: 138 MMOL/L (ref 135–147)
WBC # BLD AUTO: 5.88 THOUSAND/UL (ref 4.31–10.16)

## 2022-06-30 PROCEDURE — 99285 EMERGENCY DEPT VISIT HI MDM: CPT | Performed by: EMERGENCY MEDICINE

## 2022-06-30 PROCEDURE — 70496 CT ANGIOGRAPHY HEAD: CPT

## 2022-06-30 PROCEDURE — 36415 COLL VENOUS BLD VENIPUNCTURE: CPT | Performed by: EMERGENCY MEDICINE

## 2022-06-30 PROCEDURE — 0241U HB NFCT DS VIR RESP RNA 4 TRGT: CPT | Performed by: EMERGENCY MEDICINE

## 2022-06-30 PROCEDURE — 70498 CT ANGIOGRAPHY NECK: CPT

## 2022-06-30 PROCEDURE — 84484 ASSAY OF TROPONIN QUANT: CPT | Performed by: EMERGENCY MEDICINE

## 2022-06-30 PROCEDURE — 82948 REAGENT STRIP/BLOOD GLUCOSE: CPT

## 2022-06-30 PROCEDURE — 99285 EMERGENCY DEPT VISIT HI MDM: CPT

## 2022-06-30 PROCEDURE — 93005 ELECTROCARDIOGRAM TRACING: CPT

## 2022-06-30 PROCEDURE — 85027 COMPLETE CBC AUTOMATED: CPT | Performed by: EMERGENCY MEDICINE

## 2022-06-30 PROCEDURE — 85730 THROMBOPLASTIN TIME PARTIAL: CPT | Performed by: EMERGENCY MEDICINE

## 2022-06-30 PROCEDURE — 80053 COMPREHEN METABOLIC PANEL: CPT | Performed by: EMERGENCY MEDICINE

## 2022-06-30 PROCEDURE — 99447 NTRPROF PH1/NTRNET/EHR 11-20: CPT | Performed by: PSYCHIATRY & NEUROLOGY

## 2022-06-30 PROCEDURE — 99220 PR INITIAL OBSERVATION CARE/DAY 70 MINUTES: CPT | Performed by: INTERNAL MEDICINE

## 2022-06-30 PROCEDURE — 85610 PROTHROMBIN TIME: CPT | Performed by: EMERGENCY MEDICINE

## 2022-06-30 RX ORDER — CLOPIDOGREL BISULFATE 75 MG/1
75 TABLET ORAL DAILY
Status: DISCONTINUED | OUTPATIENT
Start: 2022-07-01 | End: 2022-07-01 | Stop reason: HOSPADM

## 2022-06-30 RX ORDER — ENOXAPARIN SODIUM 100 MG/ML
40 INJECTION SUBCUTANEOUS DAILY
Status: DISCONTINUED | OUTPATIENT
Start: 2022-06-30 | End: 2022-07-01 | Stop reason: HOSPADM

## 2022-06-30 RX ORDER — CLOPIDOGREL BISULFATE 75 MG/1
300 TABLET ORAL ONCE
Status: COMPLETED | OUTPATIENT
Start: 2022-06-30 | End: 2022-06-30

## 2022-06-30 RX ORDER — PANTOPRAZOLE SODIUM 40 MG/1
40 TABLET, DELAYED RELEASE ORAL
Status: DISCONTINUED | OUTPATIENT
Start: 2022-07-01 | End: 2022-07-01 | Stop reason: HOSPADM

## 2022-06-30 RX ORDER — ATORVASTATIN CALCIUM 40 MG/1
40 TABLET, FILM COATED ORAL EVERY EVENING
Status: DISCONTINUED | OUTPATIENT
Start: 2022-06-30 | End: 2022-07-01 | Stop reason: HOSPADM

## 2022-06-30 RX ORDER — DIAPER,BRIEF,INFANT-TODD,DISP
EACH MISCELLANEOUS 4 TIMES DAILY PRN
Status: DISCONTINUED | OUTPATIENT
Start: 2022-06-30 | End: 2022-07-01 | Stop reason: HOSPADM

## 2022-06-30 RX ADMIN — CLOPIDOGREL BISULFATE 300 MG: 75 TABLET ORAL at 14:23

## 2022-06-30 RX ADMIN — ATORVASTATIN CALCIUM 40 MG: 40 TABLET, FILM COATED ORAL at 17:23

## 2022-06-30 RX ADMIN — ENOXAPARIN SODIUM 40 MG: 40 INJECTION SUBCUTANEOUS at 17:23

## 2022-06-30 NOTE — ED NOTES
Dr Placido Gaston at bedside for eval     Alejandrina King, [place]  06/30/22 [facility]  06/30/22 0745

## 2022-06-30 NOTE — ED PROVIDER NOTES
History  Chief Complaint   Patient presents with    Dizziness     Pt reports feeling intermittent dizziness since last Friday  States she felt tingling on left arm last night  Denies any c/p       6 days ago onset of lightheadedness and nausea related to posture - worse standing up, also worse when she lies down completely flat  No spinning  No cp/sob/palps  Until last night no focal neuro complaints  Then at 1900 last night developed left sided numbness and weakness  She feels the weakness has completely resolved and the numbness has almost completely resolved  No speech changes  No new visual changes  No headache/f/c/abdo pain/urinary/bowel symp  Prior to Admission Medications   Prescriptions Last Dose Informant Patient Reported? Taking? 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     Spinal [facility]ioma ([facility] )     Thyroid disorder        Past Surgical History:   Procedure Laterality Date    APPENDECTOMY      BACK SURGERY      Posterior cervical thoracic surgery with fusion for resection of T1-2 meningioma (IDEM), WHO 1    BREAST BIOPSY Right     benign    BREAST SURGERY      GALLBLADDER SURGERY      HERNIA REPAIR      POSTERIOR CERVICAL LAMINECTOMY      AL XCAPSL CTRC RMVL INSJ IO LENS PROSTH W/O ECP Left 6/24/2019    Procedure: EXTRACTION EXTRACAPSULAR CATARACT PHACO INTRAOCULAR LENS (IOL);   Surgeon: Lluvia Low Omar Richmond MD;  Location[facility] MAIN OR;  Service: Ophthalmology   [facility]  1/27/2017    VARICOSE VEIN SURGERY         Family History   Problem Relation Age of Onset   Catherine Ashraf Cancer Mother     Alzheimer's disease Mother     Heart disease Father     Heart disease Brother     No Known Problems Daughter     No Known Problems Son     No Known Problems Daughter     No Known Problems Son      I have reviewed and agree with the history as documented  E-Cigarette/Vaping     E-Cigarette/Vaping Substances     Social History     Tobacco Use    Smoking status: Never Smoker    Smokeless tobacco: Never Used   Substance Use Topics    Alcohol use: Yes     Comment: rare    Drug use: No       Review of Systems   Constitutional: Negative for fever  HENT: Negative for rhinorrhea  Eyes: Negative for visual disturbance  Respiratory: Negative for shortness of breath  Cardiovascular: Negative for chest pain  Gastrointestinal: Positive for nausea  Negative for abdominal pain, diarrhea and vomiting  Endocrine: Negative for polydipsia  Genitourinary: Negative for dysuria, frequency and hematuria  Musculoskeletal: Negative for neck stiffness  Skin: Negative for rash  Allergic/Immunologic: Negative for immunocompromised state  Neurological: Positive for weakness and numbness  Negative for speech difficulty  Psychiatric/Behavioral: Negative for suicidal ideas  Physical Exam  Physical Exam  Constitutional:       General: She is not in acute distress  HENT:      Head: Normocephalic and atraumatic  Right Ear: External ear normal       Left Ear: External ear normal       Nose: Nose normal       Mouth/Throat:      Pharynx: Oropharynx is clear  Eyes:      Conjunctiva/sclera: Conjunctivae normal       Comments: Blind right eye with clouded cornea  Left reactive  EOMI  Cardiovascular:      Rate and Rhythm: Normal rate and regular rhythm        Heart sounds: Normal heart sounds  No murmur heard  Pulmonary:      Effort: Pulmonary effort is normal       Breath sounds: Normal breath sounds  Abdominal:      General: Bowel sounds are normal       Palpations: Abdomen is soft  There is no mass  Tenderness: There is no abdominal tenderness  There is no guarding  Musculoskeletal:         General: No swelling or tenderness  Cervical back: Normal range of motion and neck supple  Right lower leg: No edema  Left lower leg: No edema  Skin:     General: Skin is warm and dry  Capillary Refill: Capillary refill takes less than 2 seconds  Neurological:      Mental Status: She is alert and oriented to person, place, and time  Cranial Nerves: Cranial nerve deficit present  Sensory: No sensory deficit  Motor: No weakness  Coordination: Coordination normal       Comments: On neuro exam, the only pos finding is very subtle facial assymetry with slight left droop, pt and daughter are unsure if this is new or not  No sensory/neglect  No motor assymetry  Normal cerebellar testing     Psychiatric:         Mood and Affect: Mood normal          Vital Signs  ED Triage Vitals   Temperature Pulse Respirations Blood Pressure SpO2   06/30/22 1058 06/30/22 1058 06/30/22 1058 06/30/22 1058 06/30/22 1058   98 3 °F (36 8 °C) 72 16 133/97 99 %      Temp Source Heart Rate Source Patient Position - Orthostatic VS BP Location FiO2 (%)   06/30/22 1058 06/30/22 1058 06/30/22 1345 06/30/22 1345 --   Oral Monitor Lying Right arm       Pain Score       06/30/22 1058       No Pain           Vitals:    06/30/22 1921 07/01/22 0100 07/01/22 0820 07/01/22 1130   BP: 140/71 124/80 131/71 146/66   Pulse: 73 87 78 68   Patient Position - Orthostatic VS: Lying Lying  Lying         Visual Acuity  Visual Acuity    Flowsheet Row Most Recent Value   L Pupil Size (mm) 2          ED Medications  Medications   clopidogrel (PLAVIX) tablet 300 mg (300 mg Oral Given 6/30/22 1423) Diagnostic Studies  Results Reviewed     Procedure Component Value Units Date/Time    COVID/FLU/RSV - 2 hour TAT [[id number]]  (Normal) Collected: 06/30/22 1253    Lab Status: Final result Specimen: Nares from Nose Updated: 06/30/22 1094     SARS-CoV-2 Negative     INFLUENZA A PCR Negative     INFLUENZA B PCR Negative     RSV PCR Negative    Narrative:      FOR PEDIATRIC PATIENTS - copy/paste COVID Guidelines URL to browser: [email or web address]    SARS-CoV-2 assay is a Nucleic Acid Amplification assay intended for the  qualitative detection of nucleic acid from SARS-CoV-2 in nasopharyngeal  swabs  Results are for the presumptive identification of SARS-CoV-2 RNA  Positive results are indicative of infection with SARS-CoV-2, the virus  causing COVID-19, but do not rule out bacterial infection or co-infection  with other viruses  Laboratories within the [place] and its  territories are required to report all positive results to the appropriate  public health authorities  Negative results do not preclude SARS-CoV-2  infection and should not be used as the sole basis for treatment or other  patient management decisions  Negative results must be combined with  clinical observations, patient history, and epidemiological information  This test has not been FDA cleared or approved  This test has been authorized by FDA under an Emergency Use Authorization  (EUA)  This test is only authorized for the duration of time the  declaration that circumstances exist justifying the authorization of the  emergency use of an in vitro diagnostic tests for detection of SARS-CoV-2  virus and/or diagnosis of COVID-19 infection under section 564(b)(1) of  the Act, 21 U  S C  342WYP-4(E)(3), unless the authorization is terminated  or revoked sooner  The test has been validated but independent review by FDA  and CLIA is pending      Test performed using [employer]: This RT-PCR assay targets N2,  a region unique to SARS-CoV-2  A conserved region in the E-gene was chosen  for pan-Sarbecovirus detection which includes SARS-CoV-2      HS Troponin 0hr (reflex protocol) [[id number]]  (Normal) Collected: 06/30/22 1138    Lab Status: Final result Specimen: Blood Updated: 06/30/22 1205     hs TnI 0hr 3 ng/L     Comprehensive metabolic panel [[id number]] Collected: 06/30/22 1138    Lab Status: Final result Specimen: Blood Updated: 06/30/22 1157     Sodium 138 mmol/L      Potassium 4 3 mmol/L      Chloride 101 mmol/L      CO2 29 mmol/L      ANION GAP 8 mmol/L      BUN 18 mg/dL      Creatinine 0 96 mg/dL      Glucose 93 mg/dL      Calcium 8 8 mg/dL      AST 19 U/L      ALT 11 U/L      Alkaline Phosphatase 87 U/L      Total Protein 7 0 g/dL      Albumin 4 0 g/dL      Total Bilirubin 0 75 mg/dL      eGFR 53 ml/min/1 73sq m     Narrative:      [place] guidelines for Chronic Kidney Disease (CKD):     Stage 1 with normal or high GFR (GFR > 90 mL/min/1 73 square meters)    Stage 2 Mild CKD (GFR = 60-89 mL/min/1 73 square meters)    Stage 3A Moderate CKD (GFR = 45-59 mL/min/1 73 square meters)    Stage 3B Moderate CKD (GFR = 30-44 mL/min/1 73 square meters)    Stage 4 Severe CKD (GFR = 15-29 mL/min/1 73 square meters)    Stage 5 End Stage CKD (GFR <15 mL/min/1 73 square meters)  Note: GFR calculation is accurate only with a steady state creatinine    Protime-INR [[id number]]  (Normal) Collected: 06/30/22 1139    Lab Status: Final result Specimen: Blood Updated: 06/30/22 1153     Protime 13 1 seconds      INR 1 00    APTT [[id number]]  (Normal) Collected: 06/30/22 1139    Lab Status: Final result Specimen: Blood Updated: 06/30/22 1153     PTT 26 seconds     CBC and Platelet [[id number]]  (Normal) Collected: 06/30/22 1139    Lab Status: Final result Specimen: Blood Updated: 06/30/22 1141     WBC 5 88 Thousand/uL      RBC 4 42 Million/uL      Hemoglobin 12 9 g/dL Hematocrit 39 4 %      MCV 89 fL      MCH 29 2 pg      MCHC 32 7 g/dL      RDW 13 1 %      Platelets 401 Thousands/uL      MPV 11 4 fL     Fingerstick Glucose (POCT) [[id number]]  (Normal) Collected: 06/30/22 1114    Lab Status: Final result Updated: 06/30/22 1115     POC Glucose 103 mg/dl                  MRI brain wo contrast   Final Result by Sheryl Swann MD (07/01 1112)      No acute intracranial abnormality  Unchanged 1 0 cm partially calcified meningioma in right parasagittal posterior frontal vertex  Mild chronic microangiopathy  Workstation performed: [id number]         CTA stroke alert (head/neck)   Final Result by Sheryl Swann MD (06/30 1200)      Negative CTA head and neck for large vessel occlusion, dissection, aneurysm, or high-grade stenosis  Incidental thyroid nodule(s) for which nonemergent thyroid ultrasound is recommended  Additional chronic/incidental findings as detailed above  Findings were directly discussed with James Salazar at 11:44 AM                         Workstation performed: [id number]         CT stroke alert brain   Final Result by Sheryl Swann MD (06/30 1146)      No acute intracranial abnormality  Microangiopathic changes  Unchanged 1 0 cm partially calcified meningioma in right posterior frontal vertex  Findings were directly discussed with James Salazar at 11:42 AM       Workstation performed: [id number]         US thyroid    (Results Pending)              Procedures  Procedures         ED Course  ED Course as of 07/02/22 0948   Thu Jun 30, 2022   1131 Patient was refusing contrast which delayed scan, but now agrees to proceed  She cited that she doesn't take medications                    Stroke Assessment     Row Name 06/30/22 1121             NIH Stroke Scale    Interval Baseline      Level of Consciousness (1a ) 0      LOC Questions (1b ) 0      LOC Commands (1c ) 0      Best Gaze (2 ) 0      Visual (3 ) 0      Facial Palsy (4 ) 1      Motor Arm, Left (5a ) 0      Motor Arm, Right (5b ) 0      Motor Leg, Left (6a ) 0      Motor Leg, Right (6b ) 0      Limb Ataxia (7 ) 0      Sensory (8 ) 0      Best Language (9 ) 0      Dysarthria (10 ) 0      Extinction and Inattention (11 ) (Formerly Neglect) 0      Total 1              Flowsheet Row Most Recent Value   TPA Decision Options    TPA Decision Patient not a TPA candidate  Patient is not a candidate options Unclear time of onset outside appropriate time window  MDM  Number of Diagnoses or Management Options  Stroke (cerebrum) ([facility] )  Diagnosis management comments: Stroke with dizziness, onset within 24 hr, not within tpa window and only finding on exam facial droop (possible)  Consulted neuro  Admitted medicine        Disposition  Final diagnoses:   Stroke (cerebrum) ([facility] )     Time reflects when diagnosis was documented in both MDM as applicable and the Disposition within this note     Time User Action Codes Description Comment    6/30/2022 11:20 AM Mora Avila Add [I63 9] Stroke (cerebrum) ([facility] )     6/30/2022  3:46 PM Janeth Vega Add [G45 9] TIA (transient ischemic attack)     7/1/2022  1:12 PM Kristie Ruffin [E04 1] Thyroid nodule       ED Disposition     ED Disposition   Admit    Condition   Stable    Date/Time   Thu Jun 30, 2022 12:46 PM    Comment   Case was discussed with victor manuel and the patient's admission status was agreed to be Admission Status: observation status to the service of Dr Maninder Boateng     Follow up With Specialties Details Why Hiral Valenzuela MD Internal Medicine Follow up in 1 week(s)  [address] [address]  [phone]            Discharge Medication List as of 7/1/2022  2:56 PM      START taking these medications    Details   clopidogrel (PLAVIX) 75 mg tablet Take 1 tablet (75 mg total) by mouth daily, Starting Sat 7/2/2022, Until Mon 8/1/2022, Normal         CONTINUE these medications which have NOT CHANGED    Details   atorvastatin (LIPITOR) 20 mg tablet Take 1 tablet (20 mg total) by mouth daily, Starting Tue 3/23/2021, Normal      Cyanocobalamin (B-12) 1000 MCG/ML KIT Inject as directed every 30 (thirty) days , Historical Med      pantoprazole (PROTONIX) 40 mg tablet Take 1 tablet by mouth daily, Starting Sun 5/10/2020, Historical Med         STOP taking these medications       aspirin 81 mg chewable tablet Comments:   Reason for Stopping:               Outpatient Discharge Orders   US thyroid   Standing Status: Future Standing Exp   Date: 07/01/26     Discharge Diet     Activity as tolerated       PDMP Review     None          ED Provider  Electronically Signed by           Sadia Godinez MD  07/02/22 9637

## 2022-06-30 NOTE — ASSESSMENT & PLAN NOTE
Patient noticed to have left upper extremity weakness and numbness around 1900 yesterday  Symptoms have resolved significantly  NIH stroke scale 1  CTA head and neck-no acute findings  Appreciate neurology input, will discontinue aspirin and start the patient on Plavix  MRI of the brain will be obtained  PT and OT evaluation  Continue statin, increased dose up to 40 mg daily    Check lipid panel, TSH  Permissive hypertension not necessary per Neurology

## 2022-06-30 NOTE — H&P
[facility]  H&P- Chel Yañez 1935, 81 y o  female MRN: [id number]  Unit/Bed#: POLINA Encounter: [id number]  Primary Care Provider: Jj Gross MD   Date and time admitted to hospital: 6/30/2022 11:01 AM    * TIA (transient ischemic attack)  Assessment & Plan  Patient noticed to have left upper extremity weakness and numbness around 1900 yesterday  Symptoms have resolved significantly  NIH stroke scale 1  CTA head and neck-no acute findings  Appreciate neurology input, will discontinue aspirin and start the patient on Plavix  MRI of the brain will be obtained  PT and OT evaluation  Continue statin, increased dose up to 40 mg daily  Check lipid panel, TSH  Permissive hypertension not necessary per Neurology    Meningioma [facility])  Assessment & Plan  This is chronic and stable    Thyroid nodule  Assessment & Plan  Outpatient follow-up with primary care physician for ultrasound  GERD (gastroesophageal reflux disease)  Assessment & Plan  Continue on pantoprazole    VTE Prophylaxis: Enoxaparin (Lovenox)  / sequential compression device   Code Status:  DNR/DNI  POLST: There is no POLST form on file for this patient (pre-hospital)  Discussion with family: Patient daughter- left voice message    Anticipated Length of Stay:  Patient will be admitted on an Observation basis with an anticipated length of stay of  less than 2 midnights  Justification for Hospital Stay:  TIA    Total Time for Visit, including Counseling / Coordination of Care: 70 minutes  Greater than 50% of this total time spent on direct patient counseling and coordination of care  Chief Complaint:   Left upper extremity weakness and numbness    History of Present Illness:    Chel Yañez is a 81 y o  female who presents with a past medical history of meningioma, GERD, hyperlipidemia presents with complaints of left upper extremity weakness, numbness started around 1900 yesterday    Patient states she was sitting with her friends at [employer] and then she started to notice that left side of her body was feeling funny  She also states her head feels funny  Denies any dizziness, chest pain, shortness of breath  Denies any nausea vomiting or abdominal pain  Patient states most of her weakness of the left upper extremity has recovered  Reports prior history of TIA long time back    Review of Systems:    More than 10 system review of systems was performed, pertinent positive and negative findings mentioned as per history present illness  Past Medical and Surgical History:     Past Medical History:   Diagnosis Date    Breast disorder     GERD (gastroesophageal reflux disease)     Intradural extramedullary spinal tumor     Legally blind in right eye, 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     Spinal meningioma ([address] )     Thyroid disorder        Past Surgical History:   Procedure Laterality Date    APPENDECTOMY      BACK SURGERY      Posterior cervical thoracic surgery with fusion for resection of T1-2 meningioma (IDEM), WHO 1    BREAST BIOPSY Right     benign    BREAST SURGERY      GALLBLADDER SURGERY      HERNIA REPAIR      POSTERIOR CERVICAL LAMINECTOMY      [facility] XCAPSL CTRC RMVL INSJ IO LENS PROSTH W/O ECP Left 6/24/2019    Procedure: EXTRACTION EXTRACAPSULAR CATARACT PHACO INTRAOCULAR LENS (IOL); Surgeon: Janna Vergara MD;  [facility] MAIN OR;  Service: Ophthalmology    THYROID [facility]  1/27/2017    VARICOSE VEIN SURGERY         Meds/Allergies:    Prior to Admission medications    Medication Sig Start Date End Date Taking?  Authorizing Provider   aspirin 81 mg chewable tablet Chew 1 tablet (81 mg total) daily 3/24/21   Molly Chawla MD   atorvastatin (LIPITOR) 20 mg tablet Take 1 tablet (20 mg total) by mouth daily 3/23/21   Molly Chawla MD   Cyanocobalamin (B-12) 1000 MCG/ML KIT Inject as directed every 30 (thirty) days     Historical Provider, MD   pantoprazole (PROTONIX) 40 mg tablet Take 1 tablet by mouth daily 5/10/20   Historical Provider, MD     I have reviewed home medications using allscripts  Allergies: Allergies   Allergen Reactions    Povidone Iodine Hives    Penicillins Rash       Social History:     Marital Status:       Social History     Substance and Sexual Activity   Alcohol Use Yes    Comment: rare     Social History     Tobacco Use   Smoking Status Never Smoker   Smokeless Tobacco Never Used     Social History     Substance and Sexual Activity   Drug Use No       Family History:    non-contributory    Physical Exam:     Vitals:   Blood Pressure: 160/72 (06/30/22 1230)  Pulse: 77 (06/30/22 1230)  Temperature: 98 3 °F (36 8 °C) (06/30/22 1058)  Temp Source: Oral (06/30/22 1058)  Respirations: 18 (06/30/22 1230)  SpO2: 100 % (06/30/22 1230)    Physical Exam    General appearance:  Patient not in acute distress  Eyes:  Pupils reacting to light on the left  ENT:  Moist oral mucous membranes  CVS:  S1-S2 heard, regular rate and rhythm, no pedal edema  Chest:  Bilateral air entry present, clear to auscultation  Abdomen:  Soft, nontender, bowel sounds present  CNS:  No focal neurological deficits, awake alert and oriented, moving all extremities, strength 5/5 in all extremities, sensations intact  Genitourinary: deferred  Skin:  No acute rash   psychiatric:  No psychosis  Musculoskeletal:  No joint deformities       Additional Data:     Lab Results: I have personally reviewed pertinent reports        Results from last 7 days   Lab Units 06/30/22  1139   WBC Thousand/uL 5 88   HEMOGLOBIN g/dL 12 9   HEMATOCRIT % 39 4   PLATELETS Thousands/uL 245     Results from last 7 days   Lab Units 06/30/22  1138   SODIUM mmol/L 138   POTASSIUM mmol/L 4 3   CHLORIDE mmol/L 101   CO2 mmol/L 29   BUN mg/dL 18   CREATININE mg/dL 0 96   ANION GAP mmol/L 8   CALCIUM mg/dL 8 8   ALBUMIN g/dL 4 0   TOTAL BILIRUBIN mg/dL 0 75   ALK PHOS U/L 87   ALT U/L 11   AST U/L 19 GLUCOSE RANDOM mg/dL 93     Results from last 7 days   Lab Units 06/30/22  1139   INR  1 00     Results from last 7 days   Lab Units 06/30/22  1114   POC GLUCOSE mg/dl 103               Imaging: I have personally reviewed pertinent reports  CTA stroke alert (head/neck)   Final Result by Óscar Thomas MD (06/30 1200)      Negative CTA head and neck for large vessel occlusion, dissection, aneurysm, or high-grade stenosis  Incidental thyroid nodule(s) for which nonemergent thyroid ultrasound is recommended  Additional chronic/incidental findings as detailed above  Findings were directly discussed with Edward Walker at 11:44 AM                         Workstation performed: [id number]         CT stroke alert brain   Final Result by Óscar Thomas MD (06/30 1146)      No acute intracranial abnormality  Microangiopathic changes  Unchanged 1 0 cm partially calcified meningioma in right posterior frontal vertex  Findings were directly discussed with Edward Walker at 11:42 AM       Workstation performed: [id number]             EKG, Pathology, and Other Studies Reviewed on Admission:   · EKG:  Reviewed personally by me shows sinus rhythm with occasional premature atrial complexes  Allscripts / Epic Records Reviewed: Yes     ** Please Note: This note has been constructed using a voice recognition system   **

## 2022-06-30 NOTE — ED NOTES
Pt back from CT with [facility]  Pt on the monitor        Radha Sen RN  06/30/22 0876

## 2022-06-30 NOTE — TELEMEDICINE
Neurology e-Consult ([facility])   Michell Mckeon 81 y o  female MRN: [id number]  Unit/Bed#: ED OVR 25 Encounter: [id number]      Reason for Consult / Principal Problem: stroke alert  Consult to Neurology  Consult performed by: Helene Moritz, DO  Consult ordered by: Zeus Harp MD        06/30/22  Stroke Alert    Called 11:22am    ASSESSMENT:  82 y/o F with HLD, recent TIA for transient L sided numbness in March 2021, and spinal meningioma s/p resection that presents with onset of L sided weakness and numbness  LKN was last night around 7pm when symptoms began and was preceeded by a week of nausea and lightheadedness  She came this morning to the ED when symptoms did not resolve though by the time of her arrival she had near resolution of her weakness and numbness  Only deficit noted on exam was L facial droop, which was subtle  NIHSS 1  No extremity weakness/numbness at this point  HCT and CTA were reviewed - no evidence of acute ischemia/hemorrhage; no significant vascular abnormality  Not a tPA candidate as pt is outside of the window and mild, non-disabling deficit  Concern for possible minor stroke/TIA though pt had very similar presentation a few months ago, which may raise suspicion for other etiologies causing stereotyped yet transient deficits such as possible seizure      RECOMMENDATIONS:  -continue neurochecks; notify with changes  -HCT reviewed - no evidence of acute ischemia/hemorrhage  -CTA reviewed - unremarkable  -obtain MRI brain with and without contrast  -may obtain outpatient EEG unless symptoms recur/worsen  -no indication for AED at this time  -no need to repeat TTE  -telemetry  -continue on ASA/statin  -load with 300mg Plavix - may switch to Plavix monotherapy assuming pt has been compliant with ASA at home  -no need for permissive HTN: goal normotension  -will follow results; call with questions    Total time spent in review of data, discussion with requesting provider and rendering advice was 20 minutes      Edward Walker DO

## 2022-07-01 ENCOUNTER — APPOINTMENT (OUTPATIENT)
Dept: MRI IMAGING | Facility: HOSPITAL | Age: 87
End: 2022-07-01
Payer: COMMERCIAL

## 2022-07-01 VITALS
BODY MASS INDEX: 24.22 KG/M2 | WEIGHT: 131.61 LBS | RESPIRATION RATE: 16 BRPM | HEIGHT: 62 IN | OXYGEN SATURATION: 98 % | HEART RATE: 68 BPM | DIASTOLIC BLOOD PRESSURE: 66 MMHG | SYSTOLIC BLOOD PRESSURE: 146 MMHG | TEMPERATURE: 96.9 F

## 2022-07-01 LAB
ANION GAP SERPL CALCULATED.3IONS-SCNC: 7 MMOL/L (ref 4–13)
ATRIAL RATE: 74 BPM
BASOPHILS # BLD AUTO: 0.02 THOUSANDS/ΜL (ref 0–0.1)
BASOPHILS NFR BLD AUTO: 0 % (ref 0–1)
BUN SERPL-MCNC: 14 MG/DL (ref 5–25)
CALCIUM SERPL-MCNC: 8.4 MG/DL (ref 8.4–10.2)
CHLORIDE SERPL-SCNC: 105 MMOL/L (ref 96–108)
CHOLEST SERPL-MCNC: 140 MG/DL
CO2 SERPL-SCNC: 25 MMOL/L (ref 21–32)
CREAT SERPL-MCNC: 0.81 MG/DL (ref 0.6–1.3)
EOSINOPHIL # BLD AUTO: 0.14 THOUSAND/ΜL (ref 0–0.61)
EOSINOPHIL NFR BLD AUTO: 3 % (ref 0–6)
ERYTHROCYTE [DISTWIDTH] IN BLOOD BY AUTOMATED COUNT: 13.1 % (ref 11.6–15.1)
EST. AVERAGE GLUCOSE BLD GHB EST-MCNC: 114 MG/DL
GFR SERPL CREATININE-BSD FRML MDRD: 65 ML/MIN/1.73SQ M
GLUCOSE SERPL-MCNC: 87 MG/DL (ref 65–140)
HBA1C MFR BLD: 5.6 %
HCT VFR BLD AUTO: 35.7 % (ref 34.8–46.1)
HDLC SERPL-MCNC: 53 MG/DL
HGB BLD-MCNC: 11.9 G/DL (ref 11.5–15.4)
IMM GRANULOCYTES # BLD AUTO: 0 THOUSAND/UL (ref 0–0.2)
IMM GRANULOCYTES NFR BLD AUTO: 0 % (ref 0–2)
LDLC SERPL CALC-MCNC: 75 MG/DL (ref 0–100)
LYMPHOCYTES # BLD AUTO: 1.7 THOUSANDS/ΜL (ref 0.6–4.47)
LYMPHOCYTES NFR BLD AUTO: 34 % (ref 14–44)
MCH RBC QN AUTO: 29.9 PG (ref 26.8–34.3)
MCHC RBC AUTO-ENTMCNC: 33.3 G/DL (ref 31.4–37.4)
MCV RBC AUTO: 90 FL (ref 82–98)
MONOCYTES # BLD AUTO: 0.42 THOUSAND/ΜL (ref 0.17–1.22)
MONOCYTES NFR BLD AUTO: 9 % (ref 4–12)
NEUTROPHILS # BLD AUTO: 2.66 THOUSANDS/ΜL (ref 1.85–7.62)
NEUTS SEG NFR BLD AUTO: 54 % (ref 43–75)
NRBC BLD AUTO-RTO: 0 /100 WBCS
P AXIS: 58 DEGREES
PLATELET # BLD AUTO: 203 THOUSANDS/UL (ref 149–390)
PMV BLD AUTO: 11.5 FL (ref 8.9–12.7)
POTASSIUM SERPL-SCNC: 4.2 MMOL/L (ref 3.5–5.3)
PR INTERVAL: 118 MS
QRS AXIS: 26 DEGREES
QRSD INTERVAL: 80 MS
QT INTERVAL: 388 MS
QTC INTERVAL: 430 MS
RBC # BLD AUTO: 3.98 MILLION/UL (ref 3.81–5.12)
SODIUM SERPL-SCNC: 137 MMOL/L (ref 135–147)
T WAVE AXIS: 34 DEGREES
TRIGL SERPL-MCNC: 58 MG/DL
VENTRICULAR RATE: 74 BPM
WBC # BLD AUTO: 4.94 THOUSAND/UL (ref 4.31–10.16)

## 2022-07-01 PROCEDURE — 80048 BASIC METABOLIC PNL TOTAL CA: CPT | Performed by: INTERNAL MEDICINE

## 2022-07-01 PROCEDURE — 99217 PR OBSERVATION CARE DISCHARGE MANAGEMENT: CPT | Performed by: INTERNAL MEDICINE

## 2022-07-01 PROCEDURE — 93010 ELECTROCARDIOGRAM REPORT: CPT | Performed by: INTERNAL MEDICINE

## 2022-07-01 PROCEDURE — 80061 LIPID PANEL: CPT | Performed by: INTERNAL MEDICINE

## 2022-07-01 PROCEDURE — 97162 PT EVAL MOD COMPLEX 30 MIN: CPT

## 2022-07-01 PROCEDURE — 85025 COMPLETE CBC W/AUTO DIFF WBC: CPT | Performed by: INTERNAL MEDICINE

## 2022-07-01 PROCEDURE — 70551 MRI BRAIN STEM W/O DYE: CPT

## 2022-07-01 PROCEDURE — G1004 CDSM NDSC: HCPCS

## 2022-07-01 PROCEDURE — 97165 OT EVAL LOW COMPLEX 30 MIN: CPT

## 2022-07-01 PROCEDURE — 83036 HEMOGLOBIN GLYCOSYLATED A1C: CPT | Performed by: INTERNAL MEDICINE

## 2022-07-01 RX ORDER — CLOPIDOGREL BISULFATE 75 MG/1
75 TABLET ORAL DAILY
Qty: 30 TABLET | Refills: 0 | Status: SHIPPED | OUTPATIENT
Start: 2022-07-02 | End: 2022-08-03

## 2022-07-01 RX ADMIN — PANTOPRAZOLE SODIUM 40 MG: 40 TABLET, DELAYED RELEASE ORAL at 05:20

## 2022-07-01 RX ADMIN — ENOXAPARIN SODIUM 40 MG: 40 INJECTION SUBCUTANEOUS at 08:47

## 2022-07-01 RX ADMIN — CLOPIDOGREL BISULFATE 75 MG: 75 TABLET ORAL at 08:47

## 2022-07-01 NOTE — SPEECH THERAPY NOTE
Consult received  Records reviewed  Pt admitted c symptoms concerning for CVA/TIA (L UE weakness/numbness & she failed initial RN Dysphagia screening assessment)  I screened pt bedisde and she passed SLP Dysphagia Screen with thin liquid and bites of food this am  Communication deficits denied and no sxs noted  MRI results pending  No additional inpatient Speech Pathology evaluation appears indicated at this time  Please re-consult if additional concerns arise  Thank you

## 2022-07-01 NOTE — NURSING NOTE
Patient discharged home with all belongings  Discharge instructions reviewed with patient  She verbalized understanding and had no further questions  IV removed and site dressed for infection prevention  Patient transport offered but patient refused  Patient accompanied home by significant other

## 2022-07-01 NOTE — UTILIZATION REVIEW
Initial Clinical Review    Admission: Date/Time/Statement:   Admission Orders (From admission, onward)     Ordered        06/30/22 1246  Place in Observation  Once                      Orders Placed This Encounter   Procedures    Place in Observation     Standing Status:   Standing     Number of Occurrences:   1     Order Specific Question:   Level of Care     Answer:   Med Surg [16]     Order Specific Question:   Bed request comments     Answer:   tele     ED Arrival Information     Expected   -    Arrival   6/30/2022 10:39    Acuity   Urgent            Means of arrival   Walk-In    Escorted by   Self    Service   Hospitalist    Admission type   Urgent            Arrival complaint   dizzy, nausea, left side tingling           Chief Complaint   Patient presents with    Dizziness     Pt reports feeling intermittent dizziness since last Friday  States she felt tingling on left arm last night  Denies any c/p  Initial Presentation: 81 y o  female who presents with a past medical history of meningioma, GERD, hyperlipidemia presents with complaints of left upper extremity weakness, numbness started around 1900 yesterday  Patient states she was sitting with her friends at [employer] and then she started to notice that left side of her body was feeling funny  She also states her head feels funny  Patient states most of her weakness of the left upper extremity has recovered  Plan: Observation for TIA: Neurology consult-discontinue ASA and start on Plavix, MRI brain, PT/OT, continue statin-increased dose to 40 mg  Neurology consult: neurochecks, MRI brain, telemetry, continue ASA/statin, load with 300 mg Plavix        ED Triage Vitals   Temperature Pulse Respirations Blood Pressure SpO2   06/30/22 1058 06/30/22 1058 06/30/22 1058 06/30/22 1058 06/30/22 1058   98 3 °F (36 8 °C) 72 16 133/97 99 %      Temp Source Heart Rate Source Patient Position - Orthostatic VS BP Location FiO2 (%)   06/30/22 1058 06/30/22 1058 06/30/22 1345 06/30/22 1345 --   Oral Monitor Lying Right arm       Pain Score       06/30/22 1058       No Pain          Wt Readings from Last 1 Encounters:   06/30/22 59 7 kg (131 lb 9 8 oz)     Additional Vital Signs:     Date/Time Temp Pulse Resp BP MAP (mmHg) SpO2 O2 Device   07/01/22 0820 97 8 °F (36 6 °C) 78 18 131/71 -- 98 % None (Room air)   07/01/22 0100 97 8 °F (36 6 °C) 87 17 124/80 96 97 % None (Room air)   06/30/22 1921 97 2 °F (36 2 °C) Abnormal  73 16 140/71 -- 97 % --   06/30/22 1812 97 2 °F (36 2 °C) Abnormal  66 16 127/68 -- 99 % --   06/30/22 1500 -- -- -- -- -- 98 % None (Room air)   06/30/22 1345 96 4 °F (35 8 °C) Abnormal  78 16 148/70 -- 98 % --   06/30/22 1301 -- 88 20 147/75 -- 99 % None (Room air)   06/30/22 1230 -- 77 18 160/72 -- 100 % None (Room air)   06/30/22 1215 -- 81 20 150/57 -- 99 % None (Room air)   06/30/22 12:06:52 -- 79 20 95/54 -- 99 % --   06/30/22 1145 -- 89 18 116/86 -- 100 % None (Room air)   06/30/22 1130 -- 98 18 183/99 Abnormal   -- -- --   06/30/22 1122 -- 84 16 135/89 -- 100 % --       Pertinent Labs/Diagnostic Test Results:   CTA stroke alert (head/neck)   Final Result by Morales Chavez MD (06/30 1200)      Negative CTA head and neck for large vessel occlusion, dissection, aneurysm, or high-grade stenosis  Incidental thyroid nodule(s) for which nonemergent thyroid ultrasound is recommended  Additional chronic/incidental findings as detailed above  Findings were directly discussed with Jose Antonio Stevenson at 11:44 AM                         Workstation performed: [id number]         CT stroke alert brain   Final Result by Morales Chavez MD (06/30 0886)      No acute intracranial abnormality  Microangiopathic changes  Unchanged 1 0 cm partially calcified meningioma in right posterior frontal vertex        Findings were directly discussed with Jose Antonio Stevenson at 11:42 AM       Workstation performed: [id number]         MRI brain wo contrast    (Results Pending)     6/30 EKG:  Sinus rhythm with Premature atrial complexes  Otherwise normal ECG  When compared with ECG of 18-DEC-2021 09:53,  Premature atrial complexes are now Present  T wave amplitude has increased in Lateral leads  Results from last 7 days   Lab Units 06/30/22  1253   SARS-COV-2  Negative     Results from last 7 days   Lab Units 07/01/22  0508 06/30/22  1139   WBC Thousand/uL 4 94 5 88   HEMOGLOBIN g/dL 11 9 12 9   HEMATOCRIT % 35 7 39 4   PLATELETS Thousands/uL 203 245   NEUTROS ABS Thousands/µL 2 66  --          Results from last 7 days   Lab Units 07/01/22  0508 06/30/22  1138   SODIUM mmol/L 137 138   POTASSIUM mmol/L 4 2 4 3   CHLORIDE mmol/L 105 101   CO2 mmol/L 25 29   ANION GAP mmol/L 7 8   BUN mg/dL 14 18   CREATININE mg/dL 0 81 0 96   EGFR ml/min/1 73sq m 65 53   CALCIUM mg/dL 8 4 8 8     Results from last 7 days   Lab Units 06/30/22  1138   AST U/L 19   ALT U/L 11   ALK PHOS U/L 87   TOTAL PROTEIN g/dL 7 0   ALBUMIN g/dL 4 0   TOTAL BILIRUBIN mg/dL 0 75     Results from last 7 days   Lab Units 06/30/22  1114   POC GLUCOSE mg/dl 103     Results from last 7 days   Lab Units 07/01/22  0508 06/30/22  1138   GLUCOSE RANDOM mg/dL 87 93           Results from last 7 days   Lab Units 06/30/22  1138   HS TNI 0HR ng/L 3         Results from last 7 days   Lab Units 06/30/22  1139   PROTIME seconds 13 1   INR  1 00   PTT seconds 26           Results from last 7 days   Lab Units 06/30/22  1253   INFLUENZA A PCR  Negative   INFLUENZA B PCR  Negative   RSV PCR  Negative         ED Treatment:   Medication Administration from 06/30/2022 1039 to 06/30/2022 1330     None        Past Medical History:   Diagnosis Date    Breast disorder     GERD (gastroesophageal reflux disease)     Intradural extramedullary spinal tumor     Legally blind in right eye, 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     [facility]inal [facility] ([facility] [address] )     Thyroid disorder      Present on Admission:   TIA (transient ischemic attack)   GERD (gastroesophageal reflux disease)   Thyroid nodule   Meningioma (HCC)      Admitting Diagnosis: Dizziness [R42]  Stroke (cerebrum) ([facility] ) [I63 9]  Age/Sex: 81 y o  female  Admission Orders:  Scheduled Medications:  atorvastatin, 40 mg, Oral, QPM  clopidogrel, 75 mg, Oral, Daily  enoxaparin, 40 mg, Subcutaneous, Daily  pantoprazole, 40 mg, Oral, Early Morning      Continuous IV Infusions:     PRN Meds:  hydrocortisone, , Topical, 4x Daily PRN  iohexol, 100 mL, Intravenous, Once in imaging        IP CONSULT TO NEUROLOGY  IP CONSULT TO CASE MANAGEMENT  IP CONSULT TO NUTRITION SERVICES    Network Utilization Review Department  ATTENTION: Please call with any questions or concerns to [phone] and carefully listen to the prompts so that you are directed to the right person  All voicemails are confidential   Jen Bojorquez all requests for admission clinical reviews, approved or denied determinations and any other requests to dedicated fax number below belonging to the campus where the patient is receiving treatment   List of dedicated fax numbers for the Facilities:  [address] DENIALS (Administrative/Medical Necessity) [phone]   [address] (Maternity/NICU/Pediatrics) [phone]   [address]  [address] [facility] [place] [id number] [place] [address] [address] [facility] [phone]

## 2022-07-01 NOTE — DISCHARGE SUMMARY
[address] 46  Discharge- Antonio Blas 1935, 81 y o  female MRN: [id number]  Unit/Bed#: -01 Encounter: [id number]  Primary Care Provider: Vimal Hays MD   Date and time admitted to hospital: 6/30/2022 11:01 AM    * TIA (transient ischemic attack)  Assessment & Plan  Patient symptoms have improved significantly, seen by PT and OT and patient done very well with the therapy  MRI of the brain no acute stroke  Reviewed findings with Neurology and cleared from Neurology for discharge to home  As per instructions from Neurology will discharge patient on Plavix, discontinue aspirin  Continue statin    Meningioma [facility])  Assessment & Plan  This is chronic and stable    Thyroid nodule  Assessment & Plan  Outpatient follow-up with primary care physician for ultrasound  GERD (gastroesophageal reflux disease)  Assessment & Plan  Continue on pantoprazole              Medical Problems             Resolved Problems  Date Reviewed: 7/1/2022   None                 Admission Date:   Admission Orders (From admission, onward)     Ordered        06/30/22 1246  Place in Observation  Once                        Admitting Diagnosis: Dizziness [R42]  Stroke (cerebrum) ([facility] [address] ) [I63 9]        Procedures Performed: No orders of the defined types were placed in this encounter  Summary of Hospital Course:  Patient presented with complaints of left upper extremity weakness, numbness along with left facial "funny feeling"  Patient symptoms were already improving by the time she came to the emergency department, out of window for tPA  Seen by Neurology, recommended MRI which did not show any acute stroke  The aspirin was discontinued and patient was loaded with Plavix and continued 75 mg daily  Patient feels better today, being discharged to home    Call patient's daughter, left a voice message  On the day of discharge patient denies any headache, dizziness, chest pain, shortness of breath  Denies any weakness of the upper or lower extremities    Blood pressure 146/66, pulse 68, temperature (!) 96 9 °F (36 1 °C), temperature source Tympanic, resp  rate 16, height 5' 2" (1 575 m), weight 59 7 kg (131 lb 9 8 oz), SpO2 98 %  General appearance:  Patient not in acute distress  Eyes:  Left eye pupil reacting to light, right eye corneal haziness  ENT:  Moist oral mucous membranes  CVS:  S1-S2 heard, regular rate and rhythm, no pedal edema  Chest:  Bilateral air entry present, clear to auscultation  Abdomen:  Soft, nontender, bowel sounds present  CNS:  No focal neurological deficits  Genitourinary: deferred  Skin:  No acute rash   psychiatric:  No psychosis  Musculoskeletal:  No joint deformities      Significant Findings, Care, Treatment and Services Provided:  As mentioned above    Complications:  None    Condition at Discharge: good         Discharge instructions/Information to patient and family:   See after visit summary for information provided to patient and family  Provisions for Follow-Up Care:  See after visit summary for information related to follow-up care and any pertinent home health orders  PCP: Karley Carrera MD    Disposition: Home    Planned Readmission: No    Discharge Statement   I spent 35 minutes discharging the patient  This time was spent on the day of discharge  I had direct contact with the patient on the day of discharge  Additional documentation is required if more than 30 minutes were spent on discharge  Discharge Medications:  See after visit summary for reconciled discharge medications provided to patient and family

## 2022-07-01 NOTE — PLAN OF CARE
Problem: PAIN - ADULT  Goal: Verbalizes/displays adequate comfort level or baseline comfort level  Description: Interventions:  - Encourage patient to monitor pain and request assistance  - Assess pain using appropriate pain scale  - Administer analgesics based on type and severity of pain and evaluate response  - Implement non-pharmacological measures as appropriate and evaluate response  - Consider cultural and social influences on pain and pain management  - Notify physician/advanced practitioner if interventions unsuccessful or patient reports new pain  Outcome: Progressing     Problem: INFECTION - ADULT  Goal: Absence or prevention of progression during hospitalization  Description: INTERVENTIONS:  - Assess and monitor for signs and symptoms of infection  - Monitor lab/diagnostic results  - Monitor all insertion sites, i e  indwelling lines, tubes, and drains  - Monitor endotracheal if appropriate and nasal secretions for changes in amount and color  - [place] appropriate cooling/warming therapies per order  - Administer medications as ordered  - Instruct and encourage patient and family to use good hand hygiene technique  - Identify and instruct in appropriate isolation precautions for identified infection/condition  Outcome: Progressing  Goal: Absence of fever/infection during neutropenic period  Description: INTERVENTIONS:  - Monitor WBC    Outcome: Progressing     Problem: SAFETY ADULT  Goal: Patient will remain free of falls  Description: INTERVENTIONS:  - Educate patient/family on patient safety including physical limitations  - Instruct patient to call for assistance with activity   - Consult OT/PT to assist with strengthening/mobility   - Keep Call bell within reach  - Keep bed low and locked with side rails adjusted as appropriate  - Keep care items and personal belongings within reach  - Initiate and maintain comfort rounds  - Make Fall Risk Sign visible to staff  - Offer Toileting every 2 Hours, in advance of need  - Initiate/Maintain alarm  - Obtain necessary fall risk management equipment  - Apply yellow socks and bracelet for high fall risk patients  - Consider moving patient to room near nurses station  Outcome: Progressing  Goal: Maintain or return to baseline ADL function  Description: INTERVENTIONS:  -  Assess patient's ability to carry out ADLs; assess patient's baseline for ADL function and identify physical deficits which impact ability to perform ADLs (bathing, care of mouth/teeth, toileting, grooming, dressing, etc )  - Assess/evaluate cause of self-care deficits   - Assess range of motion  - Assess patient's mobility; develop plan if impaired  - Assess patient's need for assistive devices and provide as appropriate  - Encourage maximum independence but intervene and supervise when necessary  - Involve family in performance of ADLs  - Assess for home care needs following discharge   - Consider OT consult to assist with ADL evaluation and planning for discharge  - Provide patient education as appropriate  Outcome: Progressing  Goal: Maintains/Returns to pre admission functional level  Description: INTERVENTIONS:  - Perform BMAT or MOVE assessment daily    - Set and communicate daily mobility goal to care team and patient/family/caregiver  - Collaborate with rehabilitation services on mobility goals if consulted  - Perform Range of Motion 2 times a day  - Reposition patient every 2 hours    - Dangle patient 2 times a day  - Stand patient 2 times a day  - Ambulate patient 2 times a day  - Out of bed to chair 2 times a day   - Out of bed for meals 2 times a day  - Out of bed for toileting  - Record patient progress and toleration of activity level   Outcome: Progressing     Problem: DISCHARGE PLANNING  Goal: Discharge to home or other facility with appropriate resources  Description: INTERVENTIONS:  - Identify barriers to discharge w/patient and caregiver  - Arrange for needed discharge resources and transportation as appropriate  - Identify discharge learning needs (meds, wound care, etc )  - Arrange for interpretive services to assist at discharge as needed  - Refer to Case Management Department for coordinating discharge planning if the patient needs post-hospital services based on physician/advanced practitioner order or complex needs related to functional status, cognitive ability, or social support system  Outcome: Progressing     Problem: Knowledge Deficit  Goal: Patient/family/caregiver demonstrates understanding of disease process, treatment plan, medications, and discharge instructions  Description: Complete learning assessment and assess knowledge base    Interventions:  - Provide teaching at level of understanding  - Provide teaching via preferred learning methods  Outcome: Progressing

## 2022-07-01 NOTE — ASSESSMENT & PLAN NOTE
Patient symptoms have improved significantly, seen by PT and OT and patient done very well with the therapy  MRI of the brain no acute stroke  Reviewed findings with Neurology and cleared from Neurology for discharge to home  As per instructions from Neurology will discharge patient on Plavix, discontinue aspirin    Continue statin

## 2022-07-01 NOTE — PHYSICAL THERAPY NOTE
PHYSICAL THERAPY EVALUATION  DATE: 07/01/22  TIME: [id number]    NAME:  Julio Cesar Todd  AGE:   81 y o  Mrn:   [id number]  Length Of Stay: 0    ADMIT DX:  Dizziness [R42]  Stroke (cerebrum) ([facility] [address] ) [I63 9]    Past Medical History:   Diagnosis Date    Breast disorder     GERD (gastroesophageal reflux disease)     Intradural extramedullary spinal tumor     Legally blind in right eye, 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     Spinal meningioma ([facility] [address] )     Thyroid disorder      Past Surgical History:   Procedure Laterality Date    APPENDECTOMY      BACK SURGERY      Posterior cervical thoracic surgery with fusion for resection of T1-2 meningioma (IDEM), WHO 1    BREAST BIOPSY Right     benign    BREAST SURGERY      GALLBLADDER SURGERY      HERNIA REPAIR      POSTERIOR CERVICAL LAMINECTOMY      HI XCAPSL CTRC RMVL INSJ IO LENS PROSTH W/O ECP Left 6/24/2019    Procedure: EXTRACTION EXTRACAPSULAR CATARACT PHACO INTRAOCULAR LENS (IOL); Surgeon: Mike Green MD;  Location: [facility] MAIN OR;  Service: Ophthalmology    THYROID SURGERY      US GUIDANCE  1/27/2017    VARICOSE VEIN SURGERY         Performed at least 2 patient identifiers during session: Name, Chelsi Marte, and ID bracelet       07/01/22 0840   PT Last Visit   PT Visit Date 07/01/22   Note Type   Note type Evaluation   Pain Assessment   Pain Assessment Tool 0-10   Pain Score No Pain   Effect of Pain on Daily Activities n/a   Patient's Stated Pain Goal No pain   Hospital Pain Intervention(s) Repositioned; Ambulation/increased activity   Multiple Pain Sites No   Restrictions/Precautions   Weight Bearing Precautions Per Order No   Other Precautions Telemetry; Fall Risk   Home Living   Type of Home Apartment  (Senior apartment complex)   Home Layout One level;Elevator  (1st floor apt, 0 NICANOR, elevator to other floors)   Bathroom Shower/Tub Tub/shower unit   Bathroom Toilet Standard   Bathroom Equipment Grab bars in shower;Grab bars around toilet   Bathroom Accessibility Accessible   Home Equipment Other (Comment)  (none)   Prior Function   Level of [place] Independent with ADLs and functional mobility; Needs assistance with IADLs   Lives With Alone   Receives Help From Family;Friend(s)   ADL Assistance Independent   IADLs Needs assistance  (for transportation)   Falls in the last 6 months 1 to 4   Vocational Retired   Comments Pt reports living at home alone in a first floor apartment of a senior living complex  No NICANOR her apartment; mail and friends/common areas on the second floor - elevator access but pt choses to take the stairs to 2nd floor (8+8 Steps with HR)  Pt reports being completely independent with mobility and ADLs  Friend takes her grocery shopping weekly  Pt does not drive  Friends and family provide all transportation  Pt reports 1 fall, however not within last 6 months  Good family/social support  General   Additional Pertinent History Pt admitted under observation 6/30/2022 with c/o dizziness, chest pain, L UE tingling and weakness  Dx: TIA vs CVA  Family/Caregiver Present No   Cognition   Overall Cognitive Status WFL   Arousal/Participation Alert   Orientation Level Oriented X4   Memory Within functional limits   Following Commands Follows multistep commands without difficulty   Subjective   Subjective "I feel like my head is full " "It's so hard to describe "   RUE Assessment   RUE Assessment WFL   LUE Assessment   LUE Assessment WFL   RLE Assessment   RLE Assessment WFL   Strength RLE   R Hip Flexion 4-/5   R Knee Flexion 4/5   R Knee Extension 4/5   R Ankle Dorsiflexion 4/5   Tone RLE   RLE Tone WFL   LLE Assessment   LLE Assessment WFL   Strength LLE   L Hip Flexion 4-/5   L Knee Flexion 4/5   L Knee Extension 4/5   L Ankle Dorsiflexion 4/5   Tone LLE   LLE Tone WFL   Vision-Basic Assessment   Current Vision Wears glasses all the time   Visual History Other (Comment); Cataracts  (Blind R eye (legally blind))   Patient Visual Report Other (Comment)  (no significant visual changes during acute medical episode)   Vestibular   Spontaneous Nystagmus (-) no evidence of nystagmus at rest in room light   Coordination   Movements are Fluid and Coordinated 1   Sensation WFL   Light Touch   RLE Light Touch Grossly intact   LLE Light Touch Grossly intact   Proprioception   RLE Proprioception Grossly intact   LLE Proprioception Grossly Intact   Self Selected Walking Speed   SSWS Trial 1 (Seconds) 2 63 Seconds   SSWS Trial 2 (Seconds) 2 7 Seconds   SSWS Trial 3 (Seconds) 2 65 Seconds   SSWS Average Score (m/sec) 2 3 m/sec   Bed Mobility   Supine to Sit Unable to assess   Sit to Supine Unable to assess   Additional Comments Pt presents and was left seated at EOB; pt reports independently completing bed mobility earlier in AM    Transfers   Sit to Stand 7  Independent   Stand to Sit 7  Independent   Stand pivot 7  Independent   Ambulation/Elevation   Gait pattern WNL  (gait speed of 2 3 m/s (more than functional for age matched norms))   Gait Assistance 7  Independent   Assistive Device None   Distance 150ft x1   Stair Management Assistance 7  Independent   Stair Management Technique One rail L;Foreward;Reciprocal   Number of Stairs 10   Balance   Static Sitting Normal   Dynamic Sitting Good   Static Standing Good   Dynamic Standing Good   Ambulatory Good   Endurance Deficit   Endurance Deficit No   Activity Tolerance   Activity Tolerance Patient tolerated treatment well   Medical Staff Made Aware Spoke with KEL TEIXEIRA OT   Nurse Made Aware Spoke with HAVEN Alexis pre/post session   Assessment   Prognosis Excellent   Assessment Pt seen for PT evaluation, cleared by HAVEN Cordova, pt on stroke pathway  Pt admitted 6/30/2022 with c/o dizziness and L UE numbness and weakness, dx TIA vs CVA  CT/CTA (-), MRI pending  Comorbidities affecting pt's fnxl performance include: GERD, hypothyroid, R eye blindness, posterior cervicothoracic surgery w/ fusion due to spinal meningioma   Personal factors affecting pt at time of PT IE include: visual impairments  PTA, pt was independent with functional mobility without assistive device, independent with ADLS, independent with IADLS, living alone in a single story apartment with 0 steps to enter, ambulating household distance and ambulating community distances w/ no AD  Pt demonstrates no fnxl impairments identified in objective findings from Elderly Mobility Scale assessment, scoring 20/20, indicating independence with all aspects of ADLs & mobility  Pt scores 24/24 on AM-PAC objective tool, indicating pt demonstrates functional capacity for return to home self care vs with increased supports upon d/c  The Barthel Index outcome tool was used & pt scores 100 indicating no limitations of fnxl mobility/ADLS  Pt is at risk of falls d/t advanced age, acuity of medical illness and ongoing medical treatment of primary diagnosis  Pt's clinical presentation is currently evolving seen in pt's presentation of vital sign response, changing degree of symptoms, changing level of pain  This PT IE requires moderate complexity clinical decision making, based on multiple medical/physiological/fnxl/social factors which affect pt's performance w/ evaluation & therapist judgement for disposition recommendations  Based on pt presentation & limited impairments, pt appears to be at her baseline level of functional mobility and would most appropriately benefit from return to home self care/no needs upon d/c  No skilled PT services indicated in the acute care setting     Barriers to Discharge None   Goals   Patient Goals "to go home soon"   PT Treatment Day 0   Recommendation   PT Discharge Recommendation No rehabilitation needs   AM-PAC Basic Mobility Inpatient   Turning in Bed Without Bedrails 4   Lying on Back to Sitting on Edge of Flat Bed 4   Moving Bed to Chair 4   Standing Up From Chair 4   Walk in Room 4   Climb 3-5 Stairs 4   Basic Mobility Inpatient Raw Score 24   Basic Mobility Standardized Score 57 68   Highest Level Of Mobility   -HLM Goal 8: Walk 250 feet or more   JH-HLM Achieved 7: Walk 25 feet or more   Modified Prosper Scale   Modified Prosper Scale 1   Barthel Index   Feeding 10   Bathing 5   Grooming Score 5   Dressing Score 10   Bladder Score 10   Bowels Score 10   Toilet Use Score 10   Transfers (Bed/Chair) Score 15   Mobility (Level Surface) Score 15   Stairs Score 10   Barthel Index Score 100   End of Consult   Patient Position at End of Consult Seated edge of bed; All needs within reach   End of Consult Comments Based on patient's [facility] Level of Mobility scores today, patient currently has a goal of -HLM Levels: 8: WALK 250 FEET OR MORE, to be completed with RN staffing each shift, in order to improve overall activity tolerance and mobility, combat hospital related deconditioning, and maximize outcomes for d/c from the acute care setting  The patient's AM-PAC Basic Mobility Inpatient Short Form Raw Score is 24  A Raw score of greater than 16 suggests the patient may benefit from discharge to home  Please also refer to the recommendation of the Physical Therapist for safe discharge planning        Magdalene Baptiste PT, DPT   Available via [employer]  [facility] # [id number]  [place] License - [id number]  3/0/8113

## 2022-07-01 NOTE — OCCUPATIONAL THERAPY NOTE
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     Spinal meningioma ([facility] [address] )     Thyroid disorder        Past Surgical History:   Procedure Laterality Date    APPENDECTOMY      BACK SURGERY      Posterior cervical thoracic surgery with fusion for resection of T1-2 meningioma (IDEM), WHO 1    BREAST BIOPSY Right     benign    BREAST SURGERY      GALLBLADDER SURGERY      HERNIA REPAIR      POSTERIOR CERVICAL LAMINECTOMY      NH XCAPSL CTRC RMVL INSJ IO LENS PROSTH W/O ECP Left 6/24/2019    Procedure: EXTRACTION EXTRACAPSULAR CATARACT PHACO INTRAOCULAR LENS (IOL); Surgeon: Swati Rizvi MD;  Location: [facility] MAIN OR;  Service: Ophthalmology    THYROID SURGERY      US GUIDANCE  1/27/2017    VARICOSE VEIN SURGERY          07/01/22 0855   OT Last Visit   OT Visit Date 07/01/22   Note Type   Note type Evaluation   Restrictions/Precautions   Weight Bearing Precautions Per Order No   Other Precautions Telemetry; Fall Risk   Pain Assessment   Pain Assessment Tool 0-10   Pain Score No Pain   Home Living   Type of Home Apartment   Home Layout One level;Performs ADLs on one level; Able to live on main level with bedroom/bathroom; Access  (0 NICANOR  8+8 steps to access  and common area, elevator access available but pt prefers to complete stairs )   Bathroom Shower/Tub Tub/shower unit   Bathroom Toilet Standard   Bathroom Equipment Grab bars in shower; Shower chair   Bathroom Accessibility Accessible   Home Equipment   (no AD at baseline)   Prior Function   Level of [place] Independent with ADLs and functional mobility; Needs assistance with IADLs   Lives With Alone   Receives Help From Family;Friend(s); Neighbor   ADL Assistance Independent   IADLs Needs assistance  (A with groceries and transportation  (I) med management)   Falls in the last 6 months 0  (0 , however reporting 1 fall within last year)   Vocational Retired   Comments Pt reports being very active within community at senior living complex  Prefers to complete stairs for exercise purposes  Assistance with groceries, transportation from friend/neighbor  Good social support   Lifestyle   Autonomy PTA, pt reports being (I) with ADLs, needs assistance for IADLs  Lives alone in 1 story apartment with 0 NICANOR  (-) AD, (-) driving   Reciprocal Relationships supportive friends, family   Service to Others retired   Intrinsic Gratification socializing with friend   ADL   Eating Assistance 7  Independent   Grooming Assistance 7  [address]  [address]  [address]  [address]  [address]  7  [facility] 7  Independent   Bed Mobility   Supine to Sit 6  Modified independent   Additional items HOB elevated   Sit to Supine   (DNT: pt seated at EOB at end of eval)   Additional Comments denied lightheaded/dizziness c positional changes   Transfers   Sit to Stand 7  Independent   Stand to Sit 7  Independent   Additional Comments safe body mechanics demonstrated, no AD   Functional Mobility   Functional Mobility 7  Independent   Additional Comments functional mobility household distance within hallway, no LOB or s/s of exertion  No difficulty with dynamic balance tasks     Additional items   (no AD)   Balance   Static Sitting Normal   Dynamic Sitting Normal   Static Standing Good   Dynamic Standing Good   Activity Tolerance   Activity Tolerance Patient tolerated treatment well   Medical Staff Made Aware PT, CM, SLIM   Nurse Made Aware RN dannie pre/post session   RUE Assessment   RUE Assessment WFL  (full shoulder AROM, MMT 4+/5)   LUE Assessment   LUE Assessment WFL  (full shoulder AROM, MMT 4+/5)   Hand Function   Gross Motor Coordination Functional   Fine Motor Coordination Functional   Sensation   Light Touch No apparent deficits  (equal B/L sensation)   Vision-Basic Assessment   Current Vision Wears glasses all the time  (R eye legally blind at baseline  Denies visual changes in L eye)   Vision - Complex Assessment   Acuity Able to read normal print without difficulty; Able to read newspaper without difficulty; Able to read employee name badge without difficulty; Able to read clock/calendar on wall without difficulty   Cognition   Overall Cognitive Status [facility]   Arousal/Participation Alert; Cooperative   Attention Within functional limits   Orientation Level Oriented X4   Memory Within functional limits   Following Commands Follows all commands and directions without difficulty   Comments Pt agreeable to OT session, pleasant   Assessment   Prognosis Good   Assessment Patient is a 81 y o  female seen for OT evaluation at [place] [address] following admission on 6/30/2022  s/p TIA (transient ischemic attack)  Comorbidities impacting functional performance include: GERD, thryoid nodule, meningioma, s/p spinal fusion, 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  Patient presents with active orders for OT eval and treat  Performed at least 2 patient identifiers during session including name and wristband  PTA, pt reports being (I) with ADLs, needs assistance for IADLs  Lives alone in 1 story apartment with 0 NICANOR  (-) AD, (-) drivingUpon initial evaluation, patient is independent for UB/LB ADLs, and independent for transfers and functional mobility within room and bathroom with the use of with no AD   Based on functional eval, patient presents A&Ox4 with intact  attention, intact  safety awareness, and impaired  short term and long term memory  Occupational performance is not significantly impacted by any physical or cognitive deficits; no OT services warranted at this time  Recommending D/C to return to previous environment with social support once medically cleared  Will sign off, D/C OT  Please reconsult if further immediate skilled OT needs warranted  Goals   Patient Goals to return home soon, no concerns re: functional status upon D/C home   Plan   OT Frequency Eval only   Recommendation   OT Discharge Recommendation No rehabilitation needs   OT - OK to Discharge Yes  (once medically clear)   Additional Comments  Pt resting EOB at end of session with all needs met, call button in reach   Additional Comments 2 The patient's raw score on the AM-PAC Daily Activity inpatient short form is 24, standardized score is 57 54, greater than 39 4  Patients at this level are likely to benefit from discharge to home  Please refer to the recommendation of the Occupational Therapist for safe discharge planning     AM-PAC Daily Activity Inpatient   Lower Body Dressing 4   Bathing 4   Toileting 4   Upper Body Dressing 4   Grooming 4   Eating 4   Daily Activity Raw Score 24   Daily Activity Standardized Score (Calc for Raw Score >=11) 57 54   AM-[facility] Applied Cognition Inpatient   Following a Speech/Presentation 4   Understanding Ordinary Conversation 4   Taking Medications 4   Remembering Where Things Are Placed or Put Away 4   Remembering List of 4-5 Errands 4   Taking Care of Complicated Tasks 4   Applied Cognition Raw Score 24   Applied Cognition Standardized Score 62 21   Barthel Index   Feeding 10   Bathing 5   Grooming Score 5   Dressing Score 10   Bladder Score 10   Bowels Score 10   Toilet Use Score 10   Transfers (Bed/Chair) Score 15   Mobility (Level Surface) Score 15   Stairs Score 10   Barthel Index Score 100   Andreina Mata, OT

## 2022-07-01 NOTE — PLAN OF CARE
Problem: Activity Intolerance/Impaired Mobility  Goal: Mobility/activity is maintained at optimum level for patient  Description: Interventions:  - Assess and monitor patient  barriers to mobility and need for assistive/adaptive devices  - Assess patient's emotional response to limitations  - Collaborate with interdisciplinary team and initiate plans and interventions as ordered  - Encourage independent activity per ability   - Maintain proper body alignment  - Perform active/passive rom as tolerated/ordered  - Plan activities to conserve energy   - Turn patient as appropriate  Outcome: Progressing     Problem: Communication Impairment  Goal: Ability to express needs and understand communication  Description: Assess patient's communication skills and ability to understand information  Patient will demonstrate use of effective communication techniques, alternative methods of communication and understanding even if not able to speak  - Encourage communication and provide alternate methods of communication as needed  - Collaborate with case management/ for discharge needs  - Include patient/family/caregiver in decisions related to communication  Outcome: Progressing     Problem: Potential for Aspiration  Goal: Non-ventilated patient's risk of aspiration is minimized  Description: Assess and monitor vital signs, respiratory status, and labs (WBC)  Monitor for signs of aspiration (tachypnea, cough, rales, wheezing, cyanosis, fever)  - Assess and monitor patient's ability to swallow  - Place patient up in chair to eat if possible  - HOB up at 90 degrees to eat if unable to get patient up into chair   - Supervise patient during oral intake  - Instruct patient/ family to take small bites  - Instruct patient/ family to take small single sips when taking liquids    - Follow patient-specific strategies generated by speech pathologist   Outcome: Progressing     Problem: Potential for Aspiration  Goal: Ventilated patient's risk of aspiration is minimized  Description: Assess and monitor vital signs, respiratory status, airway cuff pressure, and labs (WBC)  Monitor for signs of aspiration (tachypnea, cough, rales, wheezing, cyanosis, fever)  - Elevate head of bed 30 degrees if patient has tube feeding   - Monitor tube feeding  Outcome: Progressing     Problem: Nutrition  Goal: Nutrition/Hydration status is improving  Description: Monitor and assess patient's nutrition/hydration status for malnutrition (ex- brittle hair, bruises, dry skin, pale skin and conjunctiva, muscle wasting, smooth red tongue, and disorientation)  Collaborate with interdisciplinary team and initiate plan and interventions as ordered  Monitor patient's weight and dietary intake as ordered or per policy  Utilize nutrition screening tool and intervene per policy  Determine patient's food preferences and provide high-protein, high-caloric foods as appropriate  - Assist patient with eating   - Allow adequate time for meals   - Encourage patient to take dietary supplement as ordered  - Collaborate with clinical nutritionist   - Include patient/family/caregiver in decisions related to nutrition    Outcome: Progressing    Problem: SAFETY ADULT  Goal: Maintain or return to baseline ADL function  Description: INTERVENTIONS:  -  Assess patient's ability to carry out ADLs; assess patient's baseline for ADL function and identify physical deficits which impact ability to perform ADLs (bathing, care of mouth/teeth, toileting, grooming, dressing, etc )  - Assess/evaluate cause of self-care deficits   - Assess range of motion  - Assess patient's mobility; develop plan if impaired  - Assess patient's need for assistive devices and provide as appropriate  - Encourage maximum independence but intervene and supervise when necessary  - Involve family in performance of ADLs  - Assess for home care needs following discharge   - Consider OT consult to assist with ADL evaluation and planning for discharge  - Provide patient education as appropriate  Outcome: Progressing     Problem: Neurological Deficit  Goal: Neurological status is stable or improving  Description: Interventions:  - Monitor and assess patient's level of consciousness, motor function, sensory function, and level of assistance needed for ADLs  - Monitor and report changes from baseline  Collaborate with interdisciplinary team to initiate plan and implement interventions as ordered  - Provide and maintain a safe environment  - Consider seizure precautions  - Consider fall precautions  - Consider aspiration precautions  - Consider bleeding precautions    Outcome: Progressing c/o chest pain and b/l breast pain x2 months. Pt states intermittent SOB. Denies fever, chill, N+V, PMHx. Pt states she has b/l breast implants x4 year.

## 2022-07-12 ENCOUNTER — TELEPHONE (OUTPATIENT)
Dept: NEUROSURGERY | Facility: CLINIC | Age: 87
End: 2022-07-12

## 2022-07-12 DIAGNOSIS — D32.1 SPINAL MENINGIOMA (HCC): ICD-10-CM

## 2022-07-12 DIAGNOSIS — D32.9 MENINGIOMA (HCC): Primary | ICD-10-CM

## 2022-07-12 NOTE — TELEPHONE ENCOUNTER
07/13/2022-CALLED PT, CONFIRMED 07/28/2022 MRI C-SPINE AT [facility] AND F/U W/DR ALVARADO AT [facility] ON 08/03/2022    FORWARD TELEPHONE NOTE TO SYLVAIN BENSON, AND CARRIE TO CHECK FOR PRECERT ON 65/50/6141 MRI C-SPINE  [employer] MEDICARE COMPLETE  REP        07/12/2022-PT CALLED TO SCHEDULE 2 YEAR F/U BSTL APT  JESSICA PLACED AN UPDATED MRI C-SPINE SCRIPT IN CHART  PT WILL SCHEDULE MRI AND CALL OFFICE BACK TO SCHEDULE BSTL APT W/DR ALVARADO

## 2022-07-28 ENCOUNTER — HOSPITAL ENCOUNTER (OUTPATIENT)
Dept: MRI IMAGING | Facility: HOSPITAL | Age: 87
Discharge: HOME | End: 2022-07-28
Attending: NEUROLOGICAL SURGERY
Payer: COMMERCIAL

## 2022-07-28 DIAGNOSIS — D32.1 SPINAL MENINGIOMA (HCC): ICD-10-CM

## 2022-07-28 DIAGNOSIS — D32.9 MENINGIOMA (HCC): ICD-10-CM

## 2022-07-28 PROCEDURE — 72141 MRI NECK SPINE W/O DYE: CPT

## 2022-07-28 PROCEDURE — G1004 CDSM NDSC: HCPCS

## 2022-08-03 ENCOUNTER — OFFICE VISIT (OUTPATIENT)
Dept: NEUROSURGERY | Facility: CLINIC | Age: 87
End: 2022-08-03
Payer: COMMERCIAL

## 2022-08-03 VITALS
HEIGHT: 62 IN | SYSTOLIC BLOOD PRESSURE: 132 MMHG | DIASTOLIC BLOOD PRESSURE: 76 MMHG | BODY MASS INDEX: 23.63 KG/M2 | HEART RATE: 76 BPM | TEMPERATURE: 98.3 F | WEIGHT: 128.4 LBS | RESPIRATION RATE: 16 BRPM

## 2022-08-03 DIAGNOSIS — M43.23 FUSION OF SPINE OF CERVICOTHORACIC REGION: ICD-10-CM

## 2022-08-03 DIAGNOSIS — D32.1 SPINAL MENINGIOMA (HCC): Primary | ICD-10-CM

## 2022-08-03 PROCEDURE — 99214 OFFICE O/P EST MOD 30 MIN: CPT | Performed by: NEUROLOGICAL SURGERY

## 2022-08-03 NOTE — PROGRESS NOTES
Neurosurgery Office Note  Julio Cesar Todd 81 y o  female MRN: [id number]      Assessment/Plan      Diagnoses and all orders for this visit:    Spinal meningioma [facility])  -     MRI cervical spine with and without contrast; Future    Fusion of spine of cervicothoracic region  -     MRI cervical spine with and without contrast; Future          Discussion:    Pain Score:   4 (neck pain)    status post C7-T2 laminectomies for resection of her spinal meningioma (2015)  Lesion was who grade 1  Required instrumentation from C4-T3, and had decompressions C5-C7 for stenosis      Is doing very well  Quite active  ambulates without aids  Neurologically quite remarkable  Did have what sounds to have been a TIA in June (left-sided weakness), no residual, MRI brain negative  Has been on Plavix since      Follow-up MRI scan of the cervical spine shows no recurrence, and good residual decompression  Alignment remains good  She does mention some mild neck pain, which we discussed conservative measures to address      We discussed p r n  versus scheduled follow-up  She is more comfortable with scheduled  I suggest repeat MRI scan in 2-3 years, adapting NCCN guidelines cranial meningiomas  She is in agreement  I will see her back in 2-3 years  MRI CSpine to include down to T4    08/03/22 Metrics: [id number] 52980=4 826; VAS 95; ECOG 0; KPS 90: MJOA 17/17        CHIEF COMPLAINT    Chief Complaint   Patient presents with    Follow-up     2 YR F/U WITH MRI C SPINE 7/28/22       HISTORY    History of Present Illness     81y o  year old female     HPI    See Discussion    REVIEW OF SYSTEMS    Review of Systems   Constitutional: Negative  HENT: Negative  Eyes: Negative  Right eye blindness    Left eye cataract removed 6/24/19   Respiratory: Negative  Cardiovascular: Negative  Gastrointestinal: Positive for constipation (sometimes)  Endocrine: Negative  Genitourinary: Negative      Musculoskeletal: Positive for neck pain (neck radiate into back of head)  Negative for gait problem  Skin: Negative  Allergic/Immunologic: Negative  Neurological: Positive for dizziness and weakness (one episode of left arm weakness)  Negative for tremors, seizures, syncope, speech difficulty, numbness and headaches  Hematological: Does not bruise/bleed easily (patient on Plavix)  Psychiatric/Behavioral: Positive for sleep disturbance (in general)  Forgetful at times         Meds/Allergies     Current Outpatient Medications   Medication Sig Dispense Refill    atorvastatin (LIPITOR) 20 mg tablet Take 1 tablet (20 mg total) by mouth daily 30 tablet 0    clopidogrel (PLAVIX) 75 mg tablet Take 1 tablet (75 mg total) by mouth daily 30 tablet 0    Cyanocobalamin (B-12) 1000 MCG/ML KIT Inject as directed every 30 (thirty) days       Nutritional Supplements (ENSURE NUTRITION SHAKE PO) Take by mouth in the morning      pantoprazole (PROTONIX) 40 mg tablet Take 1 tablet by mouth daily       No current facility-administered medications for this visit  Allergies   Allergen Reactions    Povidone Iodine Hives    Penicillins Rash       PAST HISTORY    Past Medical History:   Diagnosis Date    Breast disorder     GERD (gastroesophageal reflux disease)     Intradural extramedullary spinal tumor     Legally blind in right eye, 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     Spinal meningioma ([address] )     Thyroid disorder        Past Surgical History:   Procedure Laterality Date    APPENDECTOMY      BACK SURGERY      Posterior cervical thoracic surgery with fusion for resection of T1-2 meningioma (IDEM), WHO 1    BREAST BIOPSY Right     benign    BREAST SURGERY      GALLBLADDER SURGERY      HERNIA REPAIR      POSTERIOR CERVICAL LAMINECTOMY      MI XCAPSL CTRC RMVL INSJ IO LENS PROSTH W/O ECP Left 06/24/2019    Procedure: EXTRACTION EXTRACAPSULAR CATARACT PHACO INTRAOCULAR LENS (IOL);   Surgeon: Vishal Terrell MD;  Location: [facility];  Service: Ophthalmology    THYROID SURGERY      US GUIDANCE  01/27/2017    VARICOSE VEIN SURGERY         Social History     Tobacco Use    Smoking status: Never Smoker    Smokeless tobacco: Never Used   Vaping Use    Vaping Use: Never used   Substance Use Topics    Alcohol use: Yes     Comment: rare    Drug use: No       Family History   Problem Relation Age of Onset   Dylan Wade Cancer Mother     Alzheimer's disease Mother     Heart disease Father     Heart disease Brother     No Known Problems Daughter     No Known Problems Son     No Known Problems Daughter     No Known Problems Son          The following portions of the patient's history were reviewed in this encounter and updated as appropriate: Past medical, surgical, family, and social history, as well as medications, allergies, and review of systems  EXAM    Vitals:Blood pressure 132/76, pulse 76, temperature 98 3 °F (36 8 °C), resp  rate 16, height 5' 2" (1 575 m), weight 58 2 kg (128 lb 6 4 oz)  ,Body mass index is 23 48 kg/m²  Physical Exam  Vitals reviewed  Constitutional:       Appearance: She is well-developed  HENT:      Head: Normocephalic  Eyes:      General: No scleral icterus  Neck:      Comments: Well healed posterior cervical thoracic scar, minimal atrophy  Cardiovascular:      Rate and Rhythm: Normal rate  Pulmonary:      Effort: Pulmonary effort is normal    Abdominal:      Palpations: Abdomen is soft  Skin:     General: Skin is warm and dry  Neurological:      Mental Status: She is alert and oriented to person, place, and time  GCS: GCS eye subscore is 4  GCS verbal subscore is 5  GCS motor subscore is 6  Psychiatric:         Speech: Speech normal          Behavior: Behavior normal          Neurologic Exam     Mental Status   Oriented to person, place, and time     Attention: normal    Speech: speech is normal   Level of consciousness: alert    Cranial Nerves     CN VII   Facial expression full, symmetric  Motor Exam   Muscle bulk: normal  Overall muscle tone: normal  Moves all extremities, grossly normal     Gait, Coordination, and Reflexes     Tremor   Resting tremor: absent  Intention tremor: absent  Action tremor: absent  No aids  MEDICAL DECISION MAKING    Imaging Studies:     MRI cervical spine without contrast    Result Date: 7/29/2022  Narrative: MRI CERVICAL SPINE WITHOUT CONTRAST INDICATION: D32 9: Benign neoplasm of meninges, unspecified D32 1: Benign neoplasm of spinal meninges  COMPARISON:  MRI cervical spine 6/17/2022  CTA head and neck 6/30/2022 TECHNIQUE:  Sagittal T1, sagittal T2, sagittal inversion recovery, axial T2, axial  2D merge IMAGE QUALITY:  Diagnostic FINDINGS: ALIGNMENT:  There is mild reversal of the mid cervical lordosis  The cervical vertebral body heights are maintained demonstrating no acute fracture or subluxation  Stable postoperative changes including bilateral transpedicular screws at the C4 through  the T3 levels along with bilateral C6-T2 decompressive laminectomies   MARROW SIGNAL:  Normal marrow signal is identified within the visualized bony structures  No discrete marrow lesion  CERVICAL AND VISUALIZED THORACIC CORD:  Normal signal within the visualized cord  PREVERTEBRAL AND PARASPINAL SOFT TISSUES:  Right hemithyroidectomy  Enlarged, heterogeneous left thyroid lobe with underlying ill-defined nodules  VISUALIZED POSTERIOR FOSSA:  The visualized posterior fossa demonstrates no abnormal signal  CERVICAL DISC SPACES: C2-3: No focal disc herniation, central canal stenosis, or neural foraminal narrowing  C3-4: Stable small central disc protrusion with left greater than right facet hypertrophy  Mild central canal narrowing  Moderate to severe left neural foraminal stenosis is again noted  C4-5: Disc desiccation with loss of disc height and partial fusion  Small central disc osteophyte complex is again noted  Mild central canal narrowing  No neural foraminal stenosis  C5-6: Disc desiccation with loss of disc height and partial fusion  Mild central canal narrowing  Mild bilateral neural foraminal stenosis  C6-7 - C7-T1: No focal disc herniation  The central canal is patent status post bilateral decompressive laminectomies  UPPER THORACIC DISC SPACES:  The central canal is widely patent at the T1-2 and T2-3 levels related to prior decompressive laminectomies  Impression: 1  Stable postoperative changes including bilateral transpedicular screws at the C4 through the T3 levels along with bilateral C6-T2 decompressive laminectomies   2  Stable degenerative disc disease at the C3-4 through the C5-6 levels with mild central canal narrowing  Persistent moderate to severe left C3-4 neural foraminal narrowing  There is no new disc herniation  3  The cervical cord appears normal in caliber and signal with no focal impingement  4  Right hemithyroidectomy  Enlarged, heterogeneous left thyroid lobe with underlying ill-defined nodules  Nonemergent thyroid ultrasound can be considered as also recommended on the prior CTA neck study  Workstation performed: [id number]       I have personally reviewed pertinent reports  and I have personally reviewed pertinent films in PACS      PLEASE NOTE:  This encounter may have been completed utilizing the [employer] Direct Speech Voice Recognition Software  Grammatical errors, random word insertions, pronoun errors and incomplete sentences are occasional consequences of the system due to software limitations, ambient noise and hardware issues  These may be missed by proof reading prior to affixing electronic signature  Any questions or concerns about the content, text or information contained within the body of this dictation should be directly addressed to the advanced practitioner or physician for clarification

## 2022-10-12 NOTE — ED PROVIDER NOTES
History  Chief Complaint   Patient presents with    Dizziness     pt c/o dizziness starting yesterday, states its hard to describe the feeling "feels like something is off on the left side only", pt also complain of decreased sensation on left arm  Patient is an 80 y/o female presenting to the ED with dizziness and left-sided numbness  Patient says the dizziness started yesterday morning and was intermittent throughout the day  She is unable to describe the dizziness in more detail but just states that she didn't feel right  She woke up this morning and noticed decreased sensation on the left side of her body around 9AM  She denies weakness or other deficits  Patient said the sensation on the left side is still present but just does not feel normal to her  She denies headaches, blurry vision, chest pain, palpitations or SOB  Prior to Admission Medications   Prescriptions Last Dose Informant Patient Reported? Taking?    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     Spinal meningioma ([address] )     Thyroid disorder        Past Surgical History:   Procedure Laterality Date    APPENDECTOMY      BACK SURGERY      Posterior cervical thoracic surgery with fusion for resection of T1-2 meningioma (IDEM), WHO 1    BREAST BIOPSY Right     BREAST SURGERY      GALLBLADDER SURGERY      HERNIA REPAIR      POSTERIOR CERVICAL LAMINECTOMY      MN XCAPSL CTRC RMVL INSJ IO LENS PROSTH W/O ECP Left 6/24/2019    Procedure: EXTRACTION EXTRACAPSULAR CATARACT PHACO INTRAOCULAR LENS (IOL); Surgeon: Kelly Robles MD;  Location: [facility] MAIN OR;  Service: Ophthalmology    THYROID SURGERY      VARICOSE VEIN SURGERY         Family History   Problem Relation Age of Onset    Cancer Mother     Alzheimer's disease Mother     Heart disease Father     Heart disease Brother     No Known Problems Daughter     No Known Problems Son     No Known Problems Daughter     No Known Problems Son      I have reviewed and agree with the history as documented  E-Cigarette/Vaping     E-Cigarette/Vaping Substances     Social History     Tobacco Use    Smoking status: Never Smoker    Smokeless tobacco: Never Used   Substance Use Topics    Alcohol use: Yes     Frequency: Monthly or less     Comment: rare    Drug use: No       Review of Systems   Constitutional: Negative for appetite change, chills, fatigue and fever  HENT: Negative for congestion, rhinorrhea, sinus pressure, sinus pain and sore throat  Eyes: Negative for photophobia and visual disturbance  Respiratory: Negative for cough, shortness of breath and wheezing  Cardiovascular: Negative for chest pain, palpitations and leg swelling  Gastrointestinal: Negative for abdominal pain, blood in stool, constipation, diarrhea, nausea and vomiting  Genitourinary: Negative for difficulty urinating, dysuria, flank pain, frequency, hematuria and urgency  Musculoskeletal: Negative for arthralgias, back pain, joint swelling, myalgias and neck pain  Skin: Negative for color change, pallor and rash  Neurological: Positive for dizziness and numbness (left-sided)  Negative for tremors, seizures, syncope, facial asymmetry, speech difficulty, weakness, light-headedness and headaches  Psychiatric/Behavioral: Negative for confusion and sleep disturbance  All other systems reviewed and are negative  Physical Exam  Physical Exam  Vitals signs and nursing note reviewed  Constitutional:       General: She is awake  Appearance: Normal appearance  She is well-developed  She is not toxic-appearing or diaphoretic  HENT:      Head: Normocephalic and atraumatic  Right Ear: External ear normal       Left Ear: External ear normal       Nose: Nose normal       Mouth/Throat:      Lips: Pink  Mouth: Mucous membranes are moist    Eyes:      General: Lids are normal  No visual field deficit or scleral icterus  Conjunctiva/sclera: Conjunctivae normal       Pupils: Pupils are equal, round, and reactive to light  Neck:      Musculoskeletal: Full passive range of motion without pain and neck supple  No injury or pain with movement  Cardiovascular:      Rate and Rhythm: Normal rate and regular rhythm  Pulses: Normal pulses  Radial pulses are 2+ on the right side and 2+ on the left side  Heart sounds: Normal heart sounds, S1 normal and S2 normal    Pulmonary:      Effort: Pulmonary effort is normal  No accessory muscle usage  Breath sounds: Normal breath sounds  No stridor  No decreased breath sounds, wheezing, rhonchi or rales  Abdominal:      General: Abdomen is flat  Bowel sounds are normal  There is no distension  Palpations: Abdomen is soft  Tenderness: There is no abdominal tenderness  There is no right CVA tenderness, left CVA tenderness, guarding or rebound  Musculoskeletal:      Right lower leg: No edema  Left lower leg: No edema  Lymphadenopathy:      Cervical: No cervical adenopathy  Skin:     General: Skin is warm and dry  Capillary Refill: Capillary refill takes less than 2 seconds  Coloration: Skin is not cyanotic, jaundiced or pale  Neurological:      Mental Status: She is alert and oriented to person, place, and time  GCS: GCS eye subscore is 4  GCS verbal subscore is 5  GCS motor subscore is 6  Cranial Nerves: No cranial nerve deficit, dysarthria or facial asymmetry  Sensory: Sensation is intact   No sensory deficit (patient states that sensation is present but feels different on the left)  Motor: No weakness, tremor or pronator drift  Coordination: Coordination is intact  Coordination normal  Finger-Nose-Finger Test and Heel to [facility] Test normal       Gait: Gait is intact  Gait normal       Comments: Baseline visual field deficit in right eye due to blindness; no new deficit  Psychiatric:         Attention and Perception: Attention normal          Mood and Affect: Mood normal          Speech: Speech normal          Behavior: Behavior is cooperative           Vital Signs  ED Triage Vitals   Temperature Pulse Respirations Blood Pressure SpO2   03/21/21 1219 03/21/21 1217 03/21/21 1217 03/21/21 1217 03/21/21 1217   97 8 °F (36 6 °C) 76 18 (!) 177/68 99 %      Temp Source Heart Rate Source Patient Position - Orthostatic VS BP Location FiO2 (%)   03/21/21 1219 03/21/21 1217 03/21/21 1217 03/21/21 1217 --   Oral Monitor Lying Left arm       Pain Score       03/21/21 1436       No Pain           Vitals:    03/21/21 1537 03/21/21 1819 03/21/21 2000 03/21/21 2200   BP: 162/72 129/78 134/69 138/63   Pulse: 74 63 67 69   Patient Position - Orthostatic VS: Lying Lying           Visual Acuity  Visual Acuity      Most Recent Value   L Pupil Size (mm)  3   R Pupil Size (mm)  5   L Pupil Shape  Round   R Pupil Shape  Round          ED Medications  Medications   atorvastatin (LIPITOR) tablet 40 mg (40 mg Oral Given 3/21/21 1800)   aspirin chewable tablet 81 mg (has no administration in time range)   enoxaparin (LOVENOX) subcutaneous injection 40 mg (40 mg Subcutaneous Given 3/21/21 1438)   aspirin tablet 325 mg (325 mg Oral Given 3/21/21 1354)       Diagnostic Studies  Results Reviewed     Procedure Component Value Units Date/Time    Urinalysis with microscopic [[id number]]  (Abnormal) Collected: 03/21/21 1942    Lab Status: Final result Specimen: Urine, Clean Catch Updated: 03/21/21 2100     Clarity, UA Clear     Color, UA Yellow     Specific Gravity, UA <=1 005     pH, UA 6 0     Glucose, UA Negative mg/dl      Ketones, UA Negative mg/dl      Blood, UA Trace-Intact     Protein, UA Negative mg/dl      Nitrite, UA Negative     Bilirubin, UA Negative     Urobilinogen, UA 1 0 E U /dl      Leukocytes, UA Negative     WBC, UA 1-2 /hpf      RBC, UA 1-2 /hpf      Bacteria, UA Occasional /hpf      Epithelial Cells Occasional /hpf     COVID19, Influenza A/B, RSV PCR, [facility] [[id number]]  (Normal) Collected: 03/21/21 1728    Lab Status: Final result Specimen: Nares from Nasopharyngeal Swab Updated: 03/21/21 1816     SARS-CoV-2 Negative     INFLUENZA A PCR Negative     INFLUENZA B PCR Negative     RSV PCR Negative    Narrative: This test has been authorized by FDA under an EUA (Emergency Use Assay) for use by authorized laboratories  Clinical caution and judgement should be used with the interpretation of these results with consideration of the clinical impression and other laboratory testing  Testing reported as "Positive" or "Negative" has been proven to be accurate according to standard laboratory validation requirements  All testing is performed with control materials showing appropriate reactivity at standard intervals      Hepatic function panel [[id number]]  (Normal) Collected: 03/21/21 1252    Lab Status: Final result Specimen: Blood from Arm, Right Updated: 03/21/21 1443     Total Bilirubin 0 66 mg/dL      Bilirubin, Direct 0 14 mg/dL      Alkaline Phosphatase 84 0 U/L      AST 21 U/L      ALT 11 U/L      Total Protein 7 0 g/dL      Albumin 4 0 g/dL     Troponin I [[id number]]  (Normal) Collected: 03/21/21 1252    Lab Status: Final result Specimen: Blood from Arm, Right Updated: 03/21/21 1333     Troponin I <0 03 ng/mL     Basic metabolic panel [[id number]] Collected: 03/21/21 1252    Lab Status: Final result Specimen: Blood from Arm, Right Updated: 03/21/21 1331     Sodium 138 mmol/L      Potassium 4 0 mmol/L      Chloride 104 mmol/L      CO2 28 mmol/L ANION GAP 6 mmol/L      BUN 18 mg/dL      Creatinine 0 90 mg/dL      Glucose 118 mg/dL      Calcium 9 0 mg/dL      eGFR 58 ml/min/1 73sq m     Narrative:      [place] guidelines for Chronic Kidney Disease (CKD):     Stage 1 with normal or high GFR (GFR > 90 mL/min/1 73 square meters)    Stage 2 Mild CKD (GFR = 60-89 mL/min/1 73 square meters)    Stage 3A Moderate CKD (GFR = 45-59 mL/min/1 73 square meters)    Stage 3B Moderate CKD (GFR = 30-44 mL/min/1 73 square meters)    Stage 4 Severe CKD (GFR = 15-29 mL/min/1 73 square meters)    Stage 5 End Stage CKD (GFR <15 mL/min/1 73 square meters)  Note: GFR calculation is accurate only with a steady state creatinine    Protime-INR [[id number]]  (Normal) Collected: 03/21/21 1252    Lab Status: Final result Specimen: Blood from Arm, Right Updated: 03/21/21 1326     Protime 10 7 seconds      INR 0 94    Narrative:      INR Reference Ranges:  No Anticoagulant, Normal:           0 9-1 1  Standard Dose, Oral Anticoagulant:  2 0-3 0  High Dose, Oral Anticoagulant:      2 5-3 5    APTT [[id number]]  (Normal) Collected: 03/21/21 1252    Lab Status: Final result Specimen: Blood from Arm, Right Updated: 03/21/21 1326     PTT 26 seconds     CBC and Platelet [[id number]]  (Normal) Collected: 03/21/21 1252    Lab Status: Final result Specimen: Blood from Arm, Right Updated: 03/21/21 1312     WBC 4 34 Thousand/uL      RBC 4 47 Million/uL      Hemoglobin 12 9 g/dL      Hematocrit 39 5 %      MCV 88 fL      MCH 28 9 pg      MCHC 32 7 g/dL      RDW 13 8 %      Platelets 481 Thousands/uL      MPV 11 4 fL                  CT stroke alert brain   Final Result by Brigid Wade MD (03/21 1256)      No mass effect, acute intracranial hemorrhage or CT evidence for a large acute vascular distribution infarct  Mild chronic microvascular ischemic change           Findings were directly discussed with Lawrence Bowens on 3/21/2021 12:49 PM       Workstation performed: [id number]         XR chest portable    (Results Pending)   VAS carotid complete study (*Order only if CTA has not been completed*)    (Results Pending)              Procedures  ECG 12 Lead Documentation Only    Date/Time: 3/21/2021 1:59 PM  Performed by: Elsy Parra PA-C  Authorized by: Elsy Parra PA-C     Indications / Diagnosis:  Stroke alert  ECG reviewed by me, the ED Provider: yes    Patient location:  ED  Previous ECG:     Previous ECG:  Unavailable  Interpretation:     Interpretation: normal    Rate:     ECG rate:  71    ECG rate assessment: normal    Rhythm:     Rhythm: sinus rhythm    Ectopy:     Ectopy: none    QRS:     QRS axis:  Normal  Conduction:     Conduction: normal    ST segments:     ST segments:  Normal  T waves:     T waves: normal    Comments:      No STEMI             ED Course  ED Course as of Mar 21 2315   Sun Mar 21, 2021   1251 Spoke with radiologist, no evidence of acute stroke on CT  Stroke Assessment     Row Name 03/21/21 1231             NIH Stroke Scale    Interval  Baseline      Level of Consciousness (1a )  0      LOC Questions (1b )  0      LOC Commands (1c )  0      Best Gaze (2 )  0      Visual (3 )  0 patient blind in right eye at baseline      Facial Palsy (4 )  0      Motor Arm, Left (5a )  0      Motor Arm, Right (5b )  0      Motor Leg, Left (6a )  0      Motor Leg, Right (6b )  0      Limb Ataxia (7 )  0      Sensory (8 )  0      Best Language (9 )  0      Dysarthria (10 )  0      Extinction and Inattention (11 ) (Formerly Neglect)  0      Total  0                    SBIRT 22yo+      Most Recent Value   SBIRT (24 yo +)   In order to provide better care to our patients, we are screening all of our patients for alcohol and drug use  Would it be okay to ask you these screening questions? Yes Filed at: 03/21/2021 1250   Initial Alcohol Screen: US AUDIT-C    1   How often do you have a drink containing alcohol?  0 Filed at: 03/21/2021 1250 2  How many drinks containing alcohol do you have on a typical day you are drinking? 0 Filed at: 03/21/2021 1250   3a  Male UNDER 65: How often do you have five or more drinks on one occasion? 0 Filed at: 03/21/2021 1250   3b  FEMALE Any Age, or MALE 65+: How often do you have 4 or more drinks on one occassion? 0 Filed at: 03/21/2021 1250   Audit-C Score  0 Filed at: 03/21/2021 1250   DIMITRIS: How many times in the past year have you    Used an illegal drug or used a prescription medication for non-medical reasons? Never Filed at: 03/21/2021 1250                    MDM  Number of Diagnoses or Management Options  Diagnosis management comments: Patient is an 80 y/o female presenting to the ED with dizziness and left-sided numbness  NIH scale 0, but due to patient's reported symptoms a stroke alert was called  CT head showed no acute intracranial hemorrhage or CT evidence for a large acute vascular distribution infarct  Patient refused IV contrast for additional imaging as she is concerned that she may possibly have a reaction to it  I discussed our concern that she could be having a stroke and that we could miss it without the additional imaging  She is still refusing and is aware that this could lead to death, permanent disability, or worsening symptoms  Discussed patient with neurology who would like patient admitted for stroke pathway  Discussed case with Dr Miguel Carlisle from [facility] who accepted patient  The management plan was discussed in detail with the patient at bedside and all questions were answered          Amount and/or Complexity of Data Reviewed  Clinical lab tests: ordered and reviewed  Tests in the radiology section of CPT®: ordered and reviewed  Discuss the patient with other providers: yes (Dr Miguel Carlisle, Dr Lissette Orellana)    Patient Progress  Patient progress: stable      Disposition  Final diagnoses:   TIA (transient ischemic attack)     Time reflects when diagnosis was documented in both MDM as applicable and the Disposition within this note     Time User Action Codes Description Comment    3/21/2021  2:01 PM Benita Carbajal Add [G45 9] TIA (transient ischemic attack)       ED Disposition     None      Follow-up Information    None         Current Discharge Medication List      CONTINUE these medications which have NOT CHANGED    Details   Cyanocobalamin (B-12) 1000 MCG/ML KIT Inject as directed every 30 (thirty) days       pantoprazole (PROTONIX) 40 mg tablet Take 1 tablet by mouth daily           No discharge procedures on file      PDMP Review     None          ED Provider  Electronically Signed by           Elsi Nix PA-C  03/21/21 6389 show

## 2022-10-28 NOTE — INCIDENTAL FINDINGS
Patient called and informed, patient verbalized understanding.     The following findings require follow up:  Radiographic finding   Finding: Incidental thyroid nodule(s) for which nonemergent thyroid ultrasound is recommended     Follow up required: yes   Follow up should be done within 1-2 weeks     Please notify the following clinician to assist with the follow up:   Dr Jordon De La Paz MD

## 2023-01-17 ENCOUNTER — TELEPHONE (OUTPATIENT)
Dept: OBGYN CLINIC | Facility: CLINIC | Age: 88
End: 2023-01-17

## 2023-01-17 NOTE — TELEPHONE ENCOUNTER
Pt called and said she feels a lump in her vagina  She would like a call back  She said she wants to be scheduled with Dr Misael Lamas only

## 2023-01-18 NOTE — TELEPHONE ENCOUNTER
Called pt and states the lump is not painful and more external   She just feels like "something is just there",  Advised pt will look over the schedule and see when we can get her in  She prefers Dr Milady Barraza but ok seeing another provider

## 2023-01-19 ENCOUNTER — OFFICE VISIT (OUTPATIENT)
Dept: OBGYN CLINIC | Facility: CLINIC | Age: 88
End: 2023-01-19

## 2023-01-19 VITALS
HEIGHT: 62 IN | BODY MASS INDEX: 23.81 KG/M2 | SYSTOLIC BLOOD PRESSURE: 128 MMHG | WEIGHT: 129.4 LBS | DIASTOLIC BLOOD PRESSURE: 72 MMHG

## 2023-01-19 DIAGNOSIS — Z11.3 SCREENING FOR STD (SEXUALLY TRANSMITTED DISEASE): Primary | ICD-10-CM

## 2023-01-19 NOTE — PROGRESS NOTES
Assessment/Plan   Diagnoses and all orders for this visit:    Screening for STD (sexually transmitted disease)  -     Hepatitis C antibody; Future  -     HIV 1/2 AG/AB w Reflex [facility] for 2 yr old and above; Future  -     RPR; Future  -     Chlamydia/GC amplified DNA by PCR    Discussion  Reassured patient with benign vulvar-vaginal examination - no signs of lumps, rashes, lesions or any sign of infection  C/G culture obtained today with patient permission  Slip provided for STI labs  Strongly encouraged and reviewed safe sexual practices - limiting the number of sexual partners, consistent condom use and testing with a new sexual partner  All questions answered at this time  Patient to call with any further questions/concerns  RTO as needed    Subjective     HPI   Dana Claudio is a 81 y o  female who presents for a vaginal lump  Patient reports her sexual partner noticed something and advised her to get checked out  She denies feeling any lump, pain, discomfort, rashes, lesions  No vulvar itch/burn; No vaginal itch/burn; No abn discharge or odor; No urinary sx - burning/pain/frequency/hematuria  No abd/pelvic pain; No fever/chills  Denies any  bleeding; reports some vaginal dryness - partner uses [employer] jelly    Pt is sexually active with one sexual partner - believes mutually monog; Reports used to have two partners but not just one; Ok to have sti cultures/hiv/hep testing; Feels safe at home  Condom use: no    Review of Systems   Constitutional: Negative for activity change, fatigue, fever and unexpected weight change  HENT: Negative for congestion, dental problem, sinus pressure and sinus pain  Eyes: Negative for visual disturbance  Respiratory: Negative for cough, shortness of breath and wheezing  Cardiovascular: Negative for chest pain and leg swelling  Gastrointestinal: Positive for abdominal distention (bloating)   Negative for abdominal pain, blood in stool, constipation, diarrhea, nausea and vomiting  Endocrine: Negative for polydipsia  Genitourinary: Negative for difficulty urinating, dyspareunia, dysuria, frequency, hematuria, menstrual problem, pelvic pain, urgency, vaginal bleeding, vaginal discharge and vaginal pain  Musculoskeletal: Negative for arthralgias and back pain  Allergic/Immunologic: Negative for environmental allergies  Neurological: Negative for dizziness, seizures and headaches  Psychiatric/Behavioral: Negative for dysphoric mood and sleep disturbance  The patient is not nervous/anxious  The following portions of the patient's history were reviewed and updated as appropriate: allergies, current medications, past family history, past medical history, past social history, past surgical history and problem list          Past Medical History:   Diagnosis Date   • Breast disorder    • GERD (gastroesophageal reflux disease)    • Intradural extramedullary spinal tumor    • Legally blind in right eye, 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    • Spinal meningioma ([facility] [address] )    • Thyroid disorder        Past Surgical History:   Procedure Laterality Date   • APPENDECTOMY     • BACK SURGERY      Posterior cervical thoracic surgery with fusion for resection of T1-2 meningioma (IDEM), WHO 1   • BREAST BIOPSY Right     benign   • BREAST SURGERY     • GALLBLADDER SURGERY     • HERNIA REPAIR     • POSTERIOR CERVICAL LAMINECTOMY     • MN XCAPSL CTRC RMVL INSJ IO LENS PROSTH W/O ECP Left 06/24/2019    Procedure: EXTRACTION EXTRACAPSULAR CATARACT PHACO INTRAOCULAR LENS (IOL);   Surgeon: Jerad Hernández MD;  Location: [facility] MAIN OR;  Service: Ophthalmology   • THYROID SURGERY     • US GUIDANCE  01/27/2017   • VARICOSE VEIN SURGERY         Family History   Problem Relation Age of Onset   • Cancer Mother    • Alzheimer's disease Mother    • Heart disease Father    • Heart disease Brother    • No Known Problems Daughter    • No Known Problems Son    • No Known Problems Daughter    • No Known Problems Son        Social History     Socioeconomic History   • Marital status:      Spouse name: Not on file   • Number of children: 4   • Years of education: HS Grad   • Highest education level: Not on file   Occupational History   • Occupation: retired   Tobacco Use   • Smoking status: Never   • Smokeless tobacco: Never   Vaping Use   • Vaping Use: Never used   Substance and Sexual Activity   • Alcohol use: Yes     Comment: rare   • Drug use: No   • Sexual activity: Not on file   Other Topics Concern   • Not on file   Social History Narrative    Lives at home alone independently     Social Determinants of Health     Financial Resource Strain: Not on file   Food Insecurity: Not on file   Transportation Needs: Not on file   Physical Activity: Not on file   Stress: Not on file   Social Connections: Not on file   Intimate Partner Violence: Not on file   Housing Stability: Not on file         Current Outpatient Medications:   •  atorvastatin (LIPITOR) 20 mg tablet, Take 1 tablet (20 mg total) by mouth daily, Disp: 30 tablet, Rfl: 0  •  clopidogrel (PLAVIX) 75 mg tablet, Take 1 tablet (75 mg total) by mouth daily, Disp: 30 tablet, Rfl: 0  •  Nutritional Supplements (ENSURE NUTRITION SHAKE PO), Take by mouth in the morning, Disp: , Rfl:   •  pantoprazole (PROTONIX) 40 mg tablet, Take 1 tablet by mouth daily, Disp: , Rfl:   •  Cyanocobalamin (B-12) 1000 MCG/ML KIT, Inject as directed every 30 (thirty) days  (Patient not taking: Reported on 1/19/2023), Disp: , Rfl:     Allergies   Allergen Reactions   • Povidone Iodine Hives   • Penicillins Rash       Objective   Vitals:    01/19/23 0902   BP: 128/72   BP Location: Left arm   Patient Position: Sitting   Cuff Size: Standard   Weight: 58 7 kg (129 lb 6 4 oz)   Height: 5' 2" (1 575 m)     Physical Exam  Vitals reviewed  Constitutional:       General: She is awake  She is not in acute distress  Appearance: Normal appearance   She is well-developed and well-groomed  She is not ill-appearing, toxic-appearing or diaphoretic  HENT:      Head: Normocephalic and atraumatic  Eyes:      Conjunctiva/sclera: Conjunctivae normal    Abdominal:      General: There is no distension  Palpations: Abdomen is soft  There is no hepatomegaly, splenomegaly or mass  Tenderness: There is no abdominal tenderness  Hernia: No hernia is present  There is no hernia in the left inguinal area or right inguinal area  Genitourinary:     General: Normal vulva  Exam position: Supine  Pubic Area: No rash or pubic lice  Labia:         Right: No rash, tenderness, lesion or injury  Left: No rash, tenderness, lesion or injury  Urethra: No prolapse, urethral pain, urethral swelling or urethral lesion  Vagina: Normal  No signs of injury and foreign body  No vaginal discharge, erythema, tenderness, bleeding, lesions or prolapsed vaginal walls  Cervix: No cervical motion tenderness, discharge, friability, lesion, erythema or cervical bleeding  Uterus: Not deviated, not enlarged, not fixed, not tender and no uterine prolapse  Adnexa:         Right: No mass, tenderness or fullness  Left: No mass, tenderness or fullness  Lymphadenopathy:      Lower Body: No right inguinal adenopathy  No left inguinal adenopathy  Skin:     General: Skin is warm and dry  Neurological:      Mental Status: She is alert and oriented to person, place, and time  Psychiatric:         Mood and Affect: Mood and affect normal          Speech: Speech normal          Behavior: Behavior normal  Behavior is cooperative  Thought Content:  Thought content normal          Judgment: Judgment normal

## 2023-01-20 LAB
C TRACH DNA SPEC QL NAA+PROBE: NEGATIVE
N GONORRHOEA DNA SPEC QL NAA+PROBE: NEGATIVE

## 2023-01-23 ENCOUNTER — APPOINTMENT (OUTPATIENT)
Dept: LAB | Facility: HOSPITAL | Age: 88
End: 2023-01-23

## 2023-01-23 DIAGNOSIS — Z86.73 PERSONAL HISTORY OF TRANSIENT CEREBRAL ISCHEMIA: ICD-10-CM

## 2023-01-23 DIAGNOSIS — R53.82 CHRONIC FATIGUE: ICD-10-CM

## 2023-01-23 DIAGNOSIS — G45.9 TIA (TRANSIENT ISCHEMIC ATTACK): ICD-10-CM

## 2023-01-23 DIAGNOSIS — E78.2 MIXED HYPERLIPIDEMIA: ICD-10-CM

## 2023-01-23 DIAGNOSIS — Z11.3 SCREENING FOR STD (SEXUALLY TRANSMITTED DISEASE): ICD-10-CM

## 2023-01-23 LAB
ALT SERPL W P-5'-P-CCNC: 10 U/L (ref 7–52)
ANION GAP SERPL CALCULATED.3IONS-SCNC: 8 MMOL/L (ref 4–13)
AST SERPL W P-5'-P-CCNC: 19 U/L (ref 13–39)
BASOPHILS # BLD AUTO: 0.04 THOUSANDS/ÂΜL (ref 0–0.1)
BASOPHILS NFR BLD AUTO: 1 % (ref 0–1)
BUN SERPL-MCNC: 19 MG/DL (ref 5–25)
CALCIUM SERPL-MCNC: 9.2 MG/DL (ref 8.4–10.2)
CHLORIDE SERPL-SCNC: 104 MMOL/L (ref 96–108)
CHOLEST SERPL-MCNC: 177 MG/DL
CO2 SERPL-SCNC: 28 MMOL/L (ref 21–32)
CREAT SERPL-MCNC: 0.87 MG/DL (ref 0.6–1.3)
EOSINOPHIL # BLD AUTO: 0.14 THOUSAND/ÂΜL (ref 0–0.61)
EOSINOPHIL NFR BLD AUTO: 3 % (ref 0–6)
ERYTHROCYTE [DISTWIDTH] IN BLOOD BY AUTOMATED COUNT: 13.8 % (ref 11.6–15.1)
GFR SERPL CREATININE-BSD FRML MDRD: 60 ML/MIN/1.73SQ M
GLUCOSE P FAST SERPL-MCNC: 92 MG/DL (ref 65–99)
HCT VFR BLD AUTO: 38.7 % (ref 34.8–46.1)
HCV AB SER QL: NORMAL
HDLC SERPL-MCNC: 68 MG/DL
HGB BLD-MCNC: 12.5 G/DL (ref 11.5–15.4)
HIV 1+2 AB+HIV1 P24 AG SERPL QL IA: NORMAL
HIV 2 AB SERPL QL IA: NORMAL
HIV1 AB SERPL QL IA: NORMAL
HIV1 P24 AG SERPL QL IA: NORMAL
IMM GRANULOCYTES # BLD AUTO: 0.01 THOUSAND/UL (ref 0–0.2)
IMM GRANULOCYTES NFR BLD AUTO: 0 % (ref 0–2)
LDLC SERPL CALC-MCNC: 95 MG/DL (ref 0–100)
LDLC SERPL DIRECT ASSAY-MCNC: 95 MG/DL (ref 0–100)
LYMPHOCYTES # BLD AUTO: 1.74 THOUSANDS/ÂΜL (ref 0.6–4.47)
LYMPHOCYTES NFR BLD AUTO: 33 % (ref 14–44)
MCH RBC QN AUTO: 29.4 PG (ref 26.8–34.3)
MCHC RBC AUTO-ENTMCNC: 32.3 G/DL (ref 31.4–37.4)
MCV RBC AUTO: 91 FL (ref 82–98)
MONOCYTES # BLD AUTO: 0.43 THOUSAND/ÂΜL (ref 0.17–1.22)
MONOCYTES NFR BLD AUTO: 8 % (ref 4–12)
NEUTROPHILS # BLD AUTO: 2.86 THOUSANDS/ÂΜL (ref 1.85–7.62)
NEUTS SEG NFR BLD AUTO: 55 % (ref 43–75)
NONHDLC SERPL-MCNC: 109 MG/DL
NRBC BLD AUTO-RTO: 0 /100 WBCS
PLATELET # BLD AUTO: 235 THOUSANDS/UL (ref 149–390)
PMV BLD AUTO: 11.2 FL (ref 8.9–12.7)
POTASSIUM SERPL-SCNC: 4.1 MMOL/L (ref 3.5–5.3)
RBC # BLD AUTO: 4.25 MILLION/UL (ref 3.81–5.12)
RPR SER QL: NORMAL
SODIUM SERPL-SCNC: 140 MMOL/L (ref 135–147)
TRIGL SERPL-MCNC: 69 MG/DL
WBC # BLD AUTO: 5.22 THOUSAND/UL (ref 4.31–10.16)

## 2023-07-25 ENCOUNTER — APPOINTMENT (OUTPATIENT)
Dept: LAB | Facility: HOSPITAL | Age: 88
End: 2023-07-25
Payer: COMMERCIAL

## 2023-07-25 DIAGNOSIS — Z13.9 SCREENING FOR UNSPECIFIED CONDITION: ICD-10-CM

## 2023-07-25 DIAGNOSIS — E78.5 HYPERLIPIDEMIA, UNSPECIFIED HYPERLIPIDEMIA TYPE: ICD-10-CM

## 2023-07-25 LAB
ALBUMIN SERPL BCP-MCNC: 4.1 G/DL (ref 3.5–5)
ALP SERPL-CCNC: 93 U/L (ref 34–104)
ALT SERPL W P-5'-P-CCNC: 9 U/L (ref 7–52)
ANION GAP SERPL CALCULATED.3IONS-SCNC: 6 MMOL/L
AST SERPL W P-5'-P-CCNC: 19 U/L (ref 13–39)
BASOPHILS # BLD AUTO: 0.04 THOUSANDS/ÂΜL (ref 0–0.1)
BASOPHILS NFR BLD AUTO: 1 % (ref 0–1)
BILIRUB SERPL-MCNC: 1.15 MG/DL (ref 0.2–1)
BUN SERPL-MCNC: 13 MG/DL (ref 5–25)
CALCIUM SERPL-MCNC: 9.1 MG/DL (ref 8.4–10.2)
CHLORIDE SERPL-SCNC: 104 MMOL/L (ref 96–108)
CHOLEST SERPL-MCNC: 167 MG/DL
CO2 SERPL-SCNC: 29 MMOL/L (ref 21–32)
CREAT SERPL-MCNC: 0.8 MG/DL (ref 0.6–1.3)
EOSINOPHIL # BLD AUTO: 0.15 THOUSAND/ÂΜL (ref 0–0.61)
EOSINOPHIL NFR BLD AUTO: 3 % (ref 0–6)
ERYTHROCYTE [DISTWIDTH] IN BLOOD BY AUTOMATED COUNT: 13.5 % (ref 11.6–15.1)
GFR SERPL CREATININE-BSD FRML MDRD: 66 ML/MIN/1.73SQ M
GLUCOSE P FAST SERPL-MCNC: 92 MG/DL (ref 65–99)
HCT VFR BLD AUTO: 40.3 % (ref 34.8–46.1)
HDLC SERPL-MCNC: 69 MG/DL
HGB BLD-MCNC: 12.8 G/DL (ref 11.5–15.4)
IMM GRANULOCYTES # BLD AUTO: 0.01 THOUSAND/UL (ref 0–0.2)
IMM GRANULOCYTES NFR BLD AUTO: 0 % (ref 0–2)
LDLC SERPL CALC-MCNC: 84 MG/DL (ref 0–100)
LYMPHOCYTES # BLD AUTO: 1.77 THOUSANDS/ÂΜL (ref 0.6–4.47)
LYMPHOCYTES NFR BLD AUTO: 35 % (ref 14–44)
MCH RBC QN AUTO: 29.1 PG (ref 26.8–34.3)
MCHC RBC AUTO-ENTMCNC: 31.8 G/DL (ref 31.4–37.4)
MCV RBC AUTO: 92 FL (ref 82–98)
MONOCYTES # BLD AUTO: 0.44 THOUSAND/ÂΜL (ref 0.17–1.22)
MONOCYTES NFR BLD AUTO: 9 % (ref 4–12)
NEUTROPHILS # BLD AUTO: 2.59 THOUSANDS/ÂΜL (ref 1.85–7.62)
NEUTS SEG NFR BLD AUTO: 52 % (ref 43–75)
NRBC BLD AUTO-RTO: 0 /100 WBCS
PLATELET # BLD AUTO: 216 THOUSANDS/UL (ref 149–390)
PMV BLD AUTO: 11.3 FL (ref 8.9–12.7)
POTASSIUM SERPL-SCNC: 4 MMOL/L (ref 3.5–5.3)
PROT SERPL-MCNC: 7 G/DL (ref 6.4–8.4)
RBC # BLD AUTO: 4.4 MILLION/UL (ref 3.81–5.12)
SODIUM SERPL-SCNC: 139 MMOL/L (ref 135–147)
TRIGL SERPL-MCNC: 71 MG/DL
TSH SERPL DL<=0.05 MIU/L-ACNC: 1.49 UIU/ML (ref 0.45–4.5)
WBC # BLD AUTO: 5 THOUSAND/UL (ref 4.31–10.16)

## 2023-07-25 PROCEDURE — 80053 COMPREHEN METABOLIC PANEL: CPT

## 2023-07-25 PROCEDURE — 80061 LIPID PANEL: CPT

## 2023-07-25 PROCEDURE — 84443 ASSAY THYROID STIM HORMONE: CPT

## 2023-07-25 PROCEDURE — 85025 COMPLETE CBC W/AUTO DIFF WBC: CPT

## 2023-07-25 PROCEDURE — 36415 COLL VENOUS BLD VENIPUNCTURE: CPT

## 2023-11-27 ENCOUNTER — HOSPITAL ENCOUNTER (OUTPATIENT)
Dept: MAMMOGRAPHY | Facility: HOSPITAL | Age: 88
Discharge: HOME | End: 2023-11-27
Payer: COMMERCIAL

## 2023-11-27 VITALS — BODY MASS INDEX: 23.74 KG/M2 | HEIGHT: 62 IN | WEIGHT: 129 LBS

## 2023-11-27 DIAGNOSIS — Z12.31 ENCOUNTER FOR SCREENING MAMMOGRAM FOR MALIGNANT NEOPLASM OF BREAST: ICD-10-CM

## 2023-11-27 PROCEDURE — 77063 BREAST TOMOSYNTHESIS BI: CPT

## 2023-11-27 PROCEDURE — 77067 SCR MAMMO BI INCL CAD: CPT

## 2024-02-05 ENCOUNTER — APPOINTMENT (OUTPATIENT)
Dept: LAB | Facility: HOSPITAL | Age: 89
End: 2024-02-05
Payer: COMMERCIAL

## 2024-02-05 DIAGNOSIS — Z13.9 SCREENING FOR UNSPECIFIED CONDITION: ICD-10-CM

## 2024-02-05 DIAGNOSIS — E78.2 MIXED HYPERLIPIDEMIA: ICD-10-CM

## 2024-02-05 LAB
ALBUMIN SERPL BCP-MCNC: 4.1 G/DL (ref 3.5–5)
ALP SERPL-CCNC: 102 U/L (ref 34–104)
ALT SERPL W P-5'-P-CCNC: 10 U/L (ref 7–52)
ANION GAP SERPL CALCULATED.3IONS-SCNC: 7 MMOL/L
AST SERPL W P-5'-P-CCNC: 18 U/L (ref 13–39)
BASOPHILS # BLD AUTO: 0.04 THOUSANDS/ÂΜL (ref 0–0.1)
BASOPHILS NFR BLD AUTO: 1 % (ref 0–1)
BILIRUB SERPL-MCNC: 0.88 MG/DL (ref 0.2–1)
BUN SERPL-MCNC: 15 MG/DL (ref 5–25)
CALCIUM SERPL-MCNC: 9 MG/DL (ref 8.4–10.2)
CHLORIDE SERPL-SCNC: 105 MMOL/L (ref 96–108)
CHOLEST SERPL-MCNC: 168 MG/DL
CO2 SERPL-SCNC: 28 MMOL/L (ref 21–32)
CREAT SERPL-MCNC: 0.89 MG/DL (ref 0.6–1.3)
EOSINOPHIL # BLD AUTO: 0.17 THOUSAND/ÂΜL (ref 0–0.61)
EOSINOPHIL NFR BLD AUTO: 3 % (ref 0–6)
ERYTHROCYTE [DISTWIDTH] IN BLOOD BY AUTOMATED COUNT: 13.3 % (ref 11.6–15.1)
GFR SERPL CREATININE-BSD FRML MDRD: 58 ML/MIN/1.73SQ M
GLUCOSE P FAST SERPL-MCNC: 91 MG/DL (ref 65–99)
HCT VFR BLD AUTO: 38.9 % (ref 34.8–46.1)
HDLC SERPL-MCNC: 61 MG/DL
HGB BLD-MCNC: 12.8 G/DL (ref 11.5–15.4)
IMM GRANULOCYTES # BLD AUTO: 0.01 THOUSAND/UL (ref 0–0.2)
IMM GRANULOCYTES NFR BLD AUTO: 0 % (ref 0–2)
LDLC SERPL CALC-MCNC: 93 MG/DL (ref 0–100)
LDLC SERPL DIRECT ASSAY-MCNC: 86 MG/DL (ref 0–100)
LYMPHOCYTES # BLD AUTO: 1.81 THOUSANDS/ÂΜL (ref 0.6–4.47)
LYMPHOCYTES NFR BLD AUTO: 33 % (ref 14–44)
MCH RBC QN AUTO: 30 PG (ref 26.8–34.3)
MCHC RBC AUTO-ENTMCNC: 32.9 G/DL (ref 31.4–37.4)
MCV RBC AUTO: 91 FL (ref 82–98)
MONOCYTES # BLD AUTO: 0.5 THOUSAND/ÂΜL (ref 0.17–1.22)
MONOCYTES NFR BLD AUTO: 9 % (ref 4–12)
NEUTROPHILS # BLD AUTO: 3.01 THOUSANDS/ÂΜL (ref 1.85–7.62)
NEUTS SEG NFR BLD AUTO: 54 % (ref 43–75)
NONHDLC SERPL-MCNC: 107 MG/DL
NRBC BLD AUTO-RTO: 0 /100 WBCS
PLATELET # BLD AUTO: 239 THOUSANDS/UL (ref 149–390)
PMV BLD AUTO: 10.9 FL (ref 8.9–12.7)
POTASSIUM SERPL-SCNC: 3.8 MMOL/L (ref 3.5–5.3)
PROT SERPL-MCNC: 7.2 G/DL (ref 6.4–8.4)
RBC # BLD AUTO: 4.26 MILLION/UL (ref 3.81–5.12)
SODIUM SERPL-SCNC: 140 MMOL/L (ref 135–147)
TRIGL SERPL-MCNC: 69 MG/DL
WBC # BLD AUTO: 5.54 THOUSAND/UL (ref 4.31–10.16)

## 2024-02-05 PROCEDURE — 85025 COMPLETE CBC W/AUTO DIFF WBC: CPT

## 2024-02-05 PROCEDURE — 36415 COLL VENOUS BLD VENIPUNCTURE: CPT

## 2024-02-05 PROCEDURE — 83721 ASSAY OF BLOOD LIPOPROTEIN: CPT

## 2024-02-05 PROCEDURE — 80061 LIPID PANEL: CPT

## 2024-02-05 PROCEDURE — 80053 COMPREHEN METABOLIC PANEL: CPT

## 2024-03-04 ENCOUNTER — APPOINTMENT (EMERGENCY)
Dept: CT IMAGING | Facility: HOSPITAL | Age: 89
End: 2024-03-04
Payer: COMMERCIAL

## 2024-03-04 ENCOUNTER — APPOINTMENT (EMERGENCY)
Dept: RADIOLOGY | Facility: HOSPITAL | Age: 89
End: 2024-03-04
Payer: COMMERCIAL

## 2024-03-04 ENCOUNTER — APPOINTMENT (EMERGENCY)
Dept: VASCULAR ULTRASOUND | Facility: HOSPITAL | Age: 89
End: 2024-03-04
Payer: COMMERCIAL

## 2024-03-04 ENCOUNTER — HOSPITAL ENCOUNTER (EMERGENCY)
Facility: HOSPITAL | Age: 89
Discharge: HOME | End: 2024-03-04
Attending: EMERGENCY MEDICINE
Payer: COMMERCIAL

## 2024-03-04 VITALS
HEART RATE: 82 BPM | WEIGHT: 135.58 LBS | DIASTOLIC BLOOD PRESSURE: 97 MMHG | TEMPERATURE: 98.9 F | SYSTOLIC BLOOD PRESSURE: 196 MMHG | OXYGEN SATURATION: 97 % | BODY MASS INDEX: 24.8 KG/M2 | RESPIRATION RATE: 18 BRPM

## 2024-03-04 DIAGNOSIS — R04.2 HEMOPTYSIS: Primary | ICD-10-CM

## 2024-03-04 DIAGNOSIS — R03.0 ELEVATED BLOOD PRESSURE READING: ICD-10-CM

## 2024-03-04 LAB
ALBUMIN SERPL BCP-MCNC: 3.9 G/DL (ref 3.5–5)
ALP SERPL-CCNC: 99 U/L (ref 34–104)
ALT SERPL W P-5'-P-CCNC: 9 U/L (ref 7–52)
ANION GAP SERPL CALCULATED.3IONS-SCNC: 6 MMOL/L
APTT PPP: 29 SECONDS (ref 23–37)
AST SERPL W P-5'-P-CCNC: 17 U/L (ref 13–39)
BASOPHILS # BLD AUTO: 0.02 THOUSANDS/ÂΜL (ref 0–0.1)
BASOPHILS NFR BLD AUTO: 0 % (ref 0–1)
BILIRUB SERPL-MCNC: 0.86 MG/DL (ref 0.2–1)
BILIRUB UR QL STRIP: NEGATIVE
BUN SERPL-MCNC: 12 MG/DL (ref 5–25)
CALCIUM SERPL-MCNC: 8.4 MG/DL (ref 8.4–10.2)
CHLORIDE SERPL-SCNC: 108 MMOL/L (ref 96–108)
CLARITY UR: CLEAR
CO2 SERPL-SCNC: 27 MMOL/L (ref 21–32)
COLOR UR: COLORLESS
CREAT SERPL-MCNC: 0.76 MG/DL (ref 0.6–1.3)
D DIMER PPP FEU-MCNC: 1.44 UG/ML FEU
EOSINOPHIL # BLD AUTO: 0.08 THOUSAND/ÂΜL (ref 0–0.61)
EOSINOPHIL NFR BLD AUTO: 1 % (ref 0–6)
ERYTHROCYTE [DISTWIDTH] IN BLOOD BY AUTOMATED COUNT: 13.6 % (ref 11.6–15.1)
GFR SERPL CREATININE-BSD FRML MDRD: 70 ML/MIN/1.73SQ M
GLUCOSE SERPL-MCNC: 111 MG/DL (ref 65–140)
GLUCOSE UR STRIP-MCNC: NEGATIVE MG/DL
HCT VFR BLD AUTO: 36.7 % (ref 34.8–46.1)
HGB BLD-MCNC: 12 G/DL (ref 11.5–15.4)
HGB UR QL STRIP.AUTO: NEGATIVE
IMM GRANULOCYTES # BLD AUTO: 0.01 THOUSAND/UL (ref 0–0.2)
IMM GRANULOCYTES NFR BLD AUTO: 0 % (ref 0–2)
INR PPP: 1.01 (ref 0.84–1.19)
KETONES UR STRIP-MCNC: NEGATIVE MG/DL
LEUKOCYTE ESTERASE UR QL STRIP: NEGATIVE
LYMPHOCYTES # BLD AUTO: 0.95 THOUSANDS/ÂΜL (ref 0.6–4.47)
LYMPHOCYTES NFR BLD AUTO: 17 % (ref 14–44)
MCH RBC QN AUTO: 29.4 PG (ref 26.8–34.3)
MCHC RBC AUTO-ENTMCNC: 32.7 G/DL (ref 31.4–37.4)
MCV RBC AUTO: 90 FL (ref 82–98)
MONOCYTES # BLD AUTO: 0.45 THOUSAND/ÂΜL (ref 0.17–1.22)
MONOCYTES NFR BLD AUTO: 8 % (ref 4–12)
NEUTROPHILS # BLD AUTO: 4.05 THOUSANDS/ÂΜL (ref 1.85–7.62)
NEUTS SEG NFR BLD AUTO: 74 % (ref 43–75)
NITRITE UR QL STRIP: NEGATIVE
NRBC BLD AUTO-RTO: 0 /100 WBCS
PH UR STRIP.AUTO: 7.5 [PH]
PLATELET # BLD AUTO: 198 THOUSANDS/UL (ref 149–390)
PMV BLD AUTO: 10.8 FL (ref 8.9–12.7)
POTASSIUM SERPL-SCNC: 3.5 MMOL/L (ref 3.5–5.3)
PROT SERPL-MCNC: 6.6 G/DL (ref 6.4–8.4)
PROT UR STRIP-MCNC: NEGATIVE MG/DL
PROTHROMBIN TIME: 13.9 SECONDS (ref 11.6–14.5)
RBC # BLD AUTO: 4.08 MILLION/UL (ref 3.81–5.12)
SODIUM SERPL-SCNC: 141 MMOL/L (ref 135–147)
SP GR UR STRIP.AUTO: 1.01 (ref 1–1.03)
UROBILINOGEN UR STRIP-ACNC: <2 MG/DL
WBC # BLD AUTO: 5.56 THOUSAND/UL (ref 4.31–10.16)

## 2024-03-04 PROCEDURE — 85610 PROTHROMBIN TIME: CPT

## 2024-03-04 PROCEDURE — 85379 FIBRIN DEGRADATION QUANT: CPT

## 2024-03-04 PROCEDURE — 36415 COLL VENOUS BLD VENIPUNCTURE: CPT

## 2024-03-04 PROCEDURE — 71045 X-RAY EXAM CHEST 1 VIEW: CPT

## 2024-03-04 PROCEDURE — 93971 EXTREMITY STUDY: CPT | Performed by: SURGERY

## 2024-03-04 PROCEDURE — 71275 CT ANGIOGRAPHY CHEST: CPT

## 2024-03-04 PROCEDURE — 85730 THROMBOPLASTIN TIME PARTIAL: CPT

## 2024-03-04 PROCEDURE — 99284 EMERGENCY DEPT VISIT MOD MDM: CPT

## 2024-03-04 PROCEDURE — 81003 URINALYSIS AUTO W/O SCOPE: CPT

## 2024-03-04 PROCEDURE — 99285 EMERGENCY DEPT VISIT HI MDM: CPT | Performed by: EMERGENCY MEDICINE

## 2024-03-04 PROCEDURE — 85025 COMPLETE CBC W/AUTO DIFF WBC: CPT

## 2024-03-04 PROCEDURE — 80053 COMPREHEN METABOLIC PANEL: CPT

## 2024-03-04 PROCEDURE — 93971 EXTREMITY STUDY: CPT

## 2024-03-04 RX ORDER — LISINOPRIL 5 MG/1
5 TABLET ORAL DAILY
Qty: 30 TABLET | Refills: 0 | Status: SHIPPED | OUTPATIENT
Start: 2024-03-04 | End: 2024-04-03

## 2024-03-04 RX ORDER — LISINOPRIL 5 MG/1
5 TABLET ORAL ONCE
Status: COMPLETED | OUTPATIENT
Start: 2024-03-04 | End: 2024-03-04

## 2024-03-04 RX ADMIN — LISINOPRIL 5 MG: 5 TABLET ORAL at 21:23

## 2024-03-04 RX ADMIN — IOHEXOL 70 ML: 350 INJECTION, SOLUTION INTRAVENOUS at 18:00

## 2024-03-04 RX ADMIN — LISINOPRIL 5 MG: 5 TABLET ORAL at 19:04

## 2024-03-04 NOTE — ED PROVIDER NOTES
History  Chief Complaint   Patient presents with    Medical Problem     Pt arrives via ems from home, was taken off plavix about a week ago, today coughed up one blood clot earlier today, denies complaints currently     HPI    (Mila Alejandro) Mila Alejandro is a 89 y.o. female who identifies as female presents to the emergency department on March 4, 2024 for coughing up blood onset this AM. Pt reports when she woke up this AM, she felt she had phlegm, but when she coughed it up, reports it was bright red. She was taken off Plavix 2/21/24 by her PCP and has not taken ASA. Denies any further episodes of hemoptysis, dizziness, lightheadedness, LOC. Patient denies chest pain, SOB, nausea, vomiting, abdominal pain, bowel or urinary changes, or any other complaint at this time.    Does not take any BP medications    Allergies include:  Allergies   Allergen Reactions    Povidone Iodine Hives    Penicillins Rash         Immunizations:  Immunization History   Administered Date(s) Administered    COVID-19 PFIZER VACCINE 0.3 ML IM 02/11/2021, 03/02/2021, 11/02/2021    COVID-19 Pfizer mRNA vacc PF dennis-sucrose 12 yr and older (Comirnaty) 11/27/2023    COVID-19 Pfizer vac (Dennis-sucrose, gray cap) 12 yr+ IM 08/02/2022     Immunizations Reviewed.    Prior to Admission Medications   Prescriptions Last Dose Informant Patient Reported? Taking?   Cyanocobalamin (B-12) 1000 MCG/ML KIT  Self Yes No   Sig: Inject as directed every 30 (thirty) days    Patient not taking: Reported on 1/19/2023   Nutritional Supplements (ENSURE NUTRITION SHAKE PO)  Self Yes No   Sig: Take by mouth in the morning   atorvastatin (LIPITOR) 20 mg tablet  Self No No   Sig: Take 1 tablet (20 mg total) by mouth daily   clopidogrel (PLAVIX) 75 mg tablet  Self No No   Sig: Take 1 tablet (75 mg total) by mouth daily   pantoprazole (PROTONIX) 40 mg tablet  Self Yes No   Sig: Take 1 tablet by mouth daily      Facility-Administered Medications: None       Past Medical  History:   Diagnosis Date    Breast disorder     GERD (gastroesophageal reflux disease)     Intradural extramedullary spinal tumor     Legally blind in right eye, 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     Spinal meningioma ([facility])     Thyroid disorder        Past Surgical History:   Procedure Laterality Date    APPENDECTOMY      BACK SURGERY      Posterior cervical thoracic surgery with fusion for resection of T1-2 meningioma (IDEM), WHO 1    BREAST BIOPSY Right     benign    BREAST SURGERY      GALLBLADDER SURGERY      HERNIA REPAIR      POSTERIOR CERVICAL LAMINECTOMY      WI XCAPSL CTRC RMVL INSJ IO LENS PROSTH W/O ECP Left 06/24/2019    Procedure: EXTRACTION EXTRACAPSULAR CATARACT PHACO INTRAOCULAR LENS (IOL);  Surgeon: Jose Luis Howard MD;  Location: [facility] MAIN OR;  Service: Ophthalmology    THYROID SURGERY      US GUIDANCE  01/27/2017    VARICOSE VEIN SURGERY         Family History   Problem Relation Age of Onset    Cancer Mother     Alzheimer's disease Mother     Heart disease Father     No Known Problems Daughter     No Known Problems Daughter     No Known Problems Maternal Grandmother     No Known Problems Maternal Grandfather     No Known Problems Paternal Grandmother     No Known Problems Paternal Grandfather     Heart disease Brother     No Known Problems Son     No Known Problems Son     Breast cancer Neg Hx      I have reviewed and agree with the history as documented.    E-Cigarette/Vaping    E-Cigarette Use Never User      E-Cigarette/Vaping Substances    Nicotine No     THC No     CBD No     Flavoring No     Other No     Unknown No      Social History     Tobacco Use    Smoking status: Never    Smokeless tobacco: Never   Vaping Use    Vaping status: Never Used   Substance Use Topics    Alcohol use: Yes     Comment: rare    Drug use: No        Review of Systems   Constitutional:  Negative for activity change, fever and unexpected weight change.   Respiratory:  Negative for cough, chest tightness and  shortness of breath.         + hemoptysis   Cardiovascular:  Negative for chest pain and palpitations.   Gastrointestinal:  Negative for abdominal pain, diarrhea, nausea and vomiting.   Genitourinary:  Negative for dysuria and hematuria.   Skin:  Negative for wound.   Allergic/Immunologic: Negative for immunocompromised state.   Neurological:  Negative for syncope.   All other systems reviewed and are negative.      Physical Exam  ED Triage Vitals   Temperature Pulse Respirations Blood Pressure SpO2   03/04/24 1447 03/04/24 1448 03/04/24 1447 03/04/24 1449 03/04/24 1448   98.9 °F (37.2 °C) 79 16 (!) 199/91 95 %      Temp Source Heart Rate Source Patient Position - Orthostatic VS BP Location FiO2 (%)   03/04/24 1447 -- 03/04/24 1449 03/04/24 1449 --   Oral  Sitting Right arm       Pain Score       03/04/24 1447       No Pain             Orthostatic Vital Signs  Vitals:    03/04/24 1700 03/04/24 1745 03/04/24 2000 03/04/24 2045   BP: 164/80 (!) 209/93 (!) 193/90 (!) 196/97   Pulse:  70 82 82   Patient Position - Orthostatic VS:           Physical Exam  Vitals and nursing note reviewed.   Constitutional:       Appearance: She is not toxic-appearing or diaphoretic.   HENT:      Head: Normocephalic and atraumatic.      Right Ear: External ear normal.      Left Ear: External ear normal.      Nose: Nose normal.      Mouth/Throat:      Mouth: Mucous membranes are moist.   Eyes:      General: No scleral icterus.     Extraocular Movements: Extraocular movements intact.      Conjunctiva/sclera: Conjunctivae normal.   Cardiovascular:      Rate and Rhythm: Normal rate and regular rhythm.      Pulses: Normal pulses.           Radial pulses are 2+ on the right side.        Dorsalis pedis pulses are 2+ on the right side and 2+ on the left side.      Heart sounds: Normal heart sounds, S1 normal and S2 normal. No murmur heard.  Pulmonary:      Effort: Pulmonary effort is normal. No respiratory distress.      Breath sounds: Normal  breath sounds. No stridor. No wheezing.   Abdominal:      Palpations: Abdomen is soft.      Tenderness: There is no abdominal tenderness.   Musculoskeletal:         General: Normal range of motion.      Cervical back: Normal range of motion.      Left lower leg: Edema present.   Skin:     General: Skin is warm and dry.   Neurological:      General: No focal deficit present.      Mental Status: She is alert and oriented to person, place, and time.      Comments: No visual field deficits, denies changes in visual acuity. Blind in R eye that is chronic.  L Pupil PERRLA, EOM intact bilaterally  Sensation intact and equal in upper, middle, and lower face  Able to open mouth fully, no TMJ tenderness, able to puff out cheeks, able keep eyes closed through tension  No facial droop or dysarthria  Able to hear finger rub equally b/l  Able to stick out tongue and move in both directions  No uvular deviation  Able to turn head and shrug shoulders against resistance  Finger to nose and heel to shin normal. Gait not assessed.   Psychiatric:         Mood and Affect: Mood normal.         ED Medications  Medications   iohexol (OMNIPAQUE) 350 MG/ML injection (MULTI-DOSE) 70 mL (70 mL Intravenous Given 3/4/24 1800)   lisinopril (ZESTRIL) tablet 5 mg (5 mg Oral Given 3/4/24 1904)   lisinopril (ZESTRIL) tablet 5 mg (5 mg Oral Given 3/4/24 2123)       Diagnostic Studies  Results Reviewed       Procedure Component Value Units Date/Time    UA w Reflex to Microscopic w Reflex to Culture [[id number]] Collected: 03/04/24 2017    Lab Status: Final result Specimen: Urine, Clean Catch Updated: 03/04/24 2037     Color, UA Colorless     Clarity, UA Clear     Specific Gravity, UA 1.015     pH, UA 7.5     Leukocytes, UA Negative     Nitrite, UA Negative     Protein, UA Negative mg/dl      Glucose, UA Negative mg/dl      Ketones, UA Negative mg/dl      Urobilinogen, UA <2.0 mg/dl      Bilirubin, UA Negative     Occult Blood, UA Negative    D-Dimer  [[id number]]  (Abnormal) Collected: 03/04/24 1617    Lab Status: Final result Specimen: Blood from Arm, Left Updated: 03/04/24 1652     D-Dimer, Quant 1.44 ug/ml FEU     Narrative:      In the evaluation for possible pulmonary embolism, in the appropriate (Well's Score of 4 or less) patient, the age adjusted d-dimer cutoff for this patient can be calculated as:    Age x 0.01 (in ug/mL) for Age-adjusted D-dimer exclusion threshold for a patient over 50 years.    Comprehensive metabolic panel [[id number]] Collected: 03/04/24 1617    Lab Status: Final result Specimen: Blood from Arm, Left Updated: 03/04/24 1647     Sodium 141 mmol/L      Potassium 3.5 mmol/L      Chloride 108 mmol/L      CO2 27 mmol/L      ANION GAP 6 mmol/L      BUN 12 mg/dL      Creatinine 0.76 mg/dL      Glucose 111 mg/dL      Calcium 8.4 mg/dL      AST 17 U/L      ALT 9 U/L      Alkaline Phosphatase 99 U/L      Total Protein 6.6 g/dL      Albumin 3.9 g/dL      Total Bilirubin 0.86 mg/dL      eGFR 70 ml/min/1.73sq m     Narrative:      National Kidney Disease Foundation guidelines for Chronic Kidney Disease (CKD):     Stage 1 with normal or high GFR (GFR > 90 mL/min/1.73 square meters)    Stage 2 Mild CKD (GFR = 60-89 mL/min/1.73 square meters)    Stage 3A Moderate CKD (GFR = 45-59 mL/min/1.73 square meters)    Stage 3B Moderate CKD (GFR = 30-44 mL/min/1.73 square meters)    Stage 4 Severe CKD (GFR = 15-29 mL/min/1.73 square meters)    Stage 5 End Stage CKD (GFR <15 mL/min/1.73 square meters)  Note: GFR calculation is accurate only with a steady state creatinine    Protime-INR [[id number]]  (Normal) Collected: 03/04/24 1617    Lab Status: Final result Specimen: Blood from Arm, Left Updated: 03/04/24 1644     Protime 13.9 seconds      INR 1.01    APTT [[id number]]  (Normal) Collected: 03/04/24 1617    Lab Status: Final result Specimen: Blood from Arm, Left Updated: 03/04/24 1644     PTT 29 seconds     CBC and differential [[id number]] Collected: 03/04/24  1617    Lab Status: Final result Specimen: Blood from Arm, Left Updated: 03/04/24 1625     WBC 5.56 Thousand/uL      RBC 4.08 Million/uL      Hemoglobin 12.0 g/dL      Hematocrit 36.7 %      MCV 90 fL      MCH 29.4 pg      MCHC 32.7 g/dL      RDW 13.6 %      MPV 10.8 fL      Platelets 198 Thousands/uL      nRBC 0 /100 WBCs      Neutrophils Relative 74 %      Immat GRANS % 0 %      Lymphocytes Relative 17 %      Monocytes Relative 8 %      Eosinophils Relative 1 %      Basophils Relative 0 %      Neutrophils Absolute 4.05 Thousands/µL      Immature Grans Absolute 0.01 Thousand/uL      Lymphocytes Absolute 0.95 Thousands/µL      Monocytes Absolute 0.45 Thousand/µL      Eosinophils Absolute 0.08 Thousand/µL      Basophils Absolute 0.02 Thousands/µL                    CTA ED chest PE study   Final Result by Eze Hinson MD (03/04 1946)      No evidence of pulmonary embolus.                  Workstation performed: [id number]         XR chest 1 view portable   ED Interpretation by Jo Finley MD (03/04 1920)   No acute cardiopulmonary changes compared to CXR on 3/21/2021. Independently interpreted by myself.      Final Result by Armando Salvador MD (03/04 2049)      No acute cardiopulmonary disease.            Workstation performed: [id number]         VAS VENOUS DUPLEX -LOWER LIMB UNILATERAL    (Results Pending)         Procedures  Procedures      ED Course  ED Course as of 03/04/24 2158   Mon Mar 04, 2024   1539 Differential diagnosis including but not limited to: Bronchitis, pneumonia, malignancy, PE, coagulopathy, anemia   1724 Per US, negative for DVT   1733 D-Dimer, Quant(!): 1.44  PE scan ordered.   1917 Re-evaluated patient, in NAD, no further episodes of hemoptysis. BP is 209 systolic, will start patient on lisinopril for BP control. Instructed patient to follow up with her PCP for further evaluation of her BP. Pending CTA read   1920 XR chest 1 view portable  No acute cardiopulmonary changes compared to CXR  on 3/21/2021. Independently interpreted by myself.   1929 Pending CTA read, called reading room to follow up. Will get read expedited.   1947 CTA ED chest PE study  No evidence of pulmonary embolus.   1947 No acute findings on workup, labwork unremarkable, and patient is NAD satting 98% on RA. Patient is stable for outpatient follow up with PCP and pulmonology for further evaluation for her hemoptysis.     Will start her on Lisinopril for BP control and have patient follow up with her PCP   1948 Discussed results with patient and plan for discharge with outpatient follow up with her PCP and pulmonology. Instructed patient to take Lisinopril and her home meds and to return to ED for new or worsening symptoms. Patient voices understanding and agrees with plan. No other concerns at this time.                               SBIRT 22yo+      Flowsheet Row Most Recent Value   Initial Alcohol Screen: US AUDIT-C     1. How often do you have a drink containing alcohol? 0 Filed at: 03/04/2024 1452   2. How many drinks containing alcohol do you have on a typical day you are drinking?  0 Filed at: 03/04/2024 1452   3b. FEMALE Any Age, or MALE 65+: How often do you have 4 or more drinks on one occassion? 0 Filed at: 03/04/2024 1452   Audit-C Score 0 Filed at: 03/04/2024 1452   DIMITRIS: How many times in the past year have you...    Used an illegal drug or used a prescription medication for non-medical reasons? Never Filed at: 03/04/2024 1452            Wells' Criteria for PE      Flowsheet Row Most Recent Value   Wells' Criteria for PE    Clinical signs and symptoms of DVT 0 Filed at: 03/04/2024 1540   PE is primary diagnosis or equally likely 3 Filed at: 03/04/2024 1724   HR >100 0 Filed at: 03/04/2024 1724   Immobilization at least 3 days or Surgery in the previous 4 weeks 0 Filed at: 03/04/2024 1724   Previous, objectively diagnosed PE or DVT 0 Filed at: 03/04/2024 1724   Hemoptysis 1 Filed at: 03/04/2024 1724   Malignancy with  treatment within 6 months or palliative 0 Filed at: 03/04/2024 1724   Wells' Criteria Total 4 Filed at: 03/04/2024 1724              Medical Decision Making  Amount and/or Complexity of Data Reviewed  Labs: ordered. Decision-making details documented in ED Course.  Radiology: ordered and independent interpretation performed. Decision-making details documented in ED Course.    Risk  Prescription drug management.        See ED course for MDM.    Disposition  Final diagnoses:   Elevated blood pressure reading   Hemoptysis     Time reflects when diagnosis was documented in both MDM as applicable and the Disposition within this note       Time User Action Codes Description Comment    3/4/2024  6:51 PM Tushar, Jo Add [R03.0] Elevated blood pressure reading     3/4/2024  7:49 PM Tushar, Jo Add [R04.2] Hemoptysis     3/4/2024  7:49 PM Tushar, Jo Modify [R03.0] Elevated blood pressure reading     3/4/2024  7:49 PM Tushar, Jo Modify [R04.2] Hemoptysis           ED Disposition       ED Disposition   Discharge    Condition   Stable    Date/Time   Mon Mar 4, 2024 1949    Comment   Mila Alejandro discharge to home/self care.                   Follow-up Information       Follow up With Specialties Details Why Contact Info    Orlando Purcell,  Family Medicine In 2 days for blood pressure [address] [place] [id number]  [phone]              Discharge Medication List as of 3/4/2024  7:50 PM        START taking these medications    Details   lisinopril (ZESTRIL) 5 mg tablet Take 1 tablet (5 mg total) by mouth daily, Starting Mon 3/4/2024, Until Wed 4/3/2024, Normal           CONTINUE these medications which have NOT CHANGED    Details   atorvastatin (LIPITOR) 20 mg tablet Take 1 tablet (20 mg total) by mouth daily, Starting Tue 3/23/2021, Normal      clopidogrel (PLAVIX) 75 mg tablet Take 1 tablet (75 mg total) by mouth daily, Starting Sat 7/2/2022, Until Thu 1/19/2023, Normal      Cyanocobalamin (B-12) 1000  MCG/ML KIT Inject as directed every 30 (thirty) days , Historical Med      Nutritional Supplements (ENSURE NUTRITION SHAKE PO) Take by mouth in the morning, Historical Med      pantoprazole (PROTONIX) 40 mg tablet Take 1 tablet by mouth daily, Starting Sun 5/10/2020, Historical Med               PDMP Review       None             ED Provider  Attending physically available and evaluated Mila Alejandro. I managed the patient along with the ED Attending.    Electronically Signed by           Jo Finley MD  03/04/24 7187     Carac Counseling:  I discussed with the patient the risks of Carac including but not limited to erythema, scaling, itching, weeping, crusting, and pain.

## 2024-03-07 NOTE — ED ATTENDING ATTESTATION
3/4/2024  ISalma DO, saw and evaluated the patient. I have discussed the patient with the resident/non-physician practitioner and agree with the resident's/non-physician practitioner's findings, Plan of Care, and MDM as documented in the resident's/non-physician practitioner's note, except where noted. All available labs and Radiology studies were reviewed.  I was present for key portions of any procedure(s) performed by the resident/non-physician practitioner and I was immediately available to provide assistance.       At this point I agree with the current assessment done in the Emergency Department.  I have conducted an independent evaluation of this patient a history and physical is as follows:    ED Course   89-year-old female presents to the emergency department accompanied by her daughter for evaluation of hemoptysis.  Patient states that she woke up this morning and coughed up sputum that had blood in it.  Patient states that she normally wakes up in the morning and coughs up phlegm however it does not normally bloody.  Patient had difficulty quantifying but did not have any further episodes of hemoptysis.  She denies associated shortness of breath.  No chest pain.  No recent URI or fevers.  Patient does not take anticoagulation.  She was taken off Plavix approximately 1 week ago.  Patient denies recent epistaxis.  No episodes of nausea or vomiting.    Past medical history: Hyperlipidemia, GERD    Physical exam: Patient is awake and alert, no acute distress.  Resting comfortably.  Pharynx without exudate or erythema.  No bleeding from the dentition or gums.  Nasal passageways are clear without dried epistaxis.  Pupils are equal and reactive.  Neck is supple without JVD.  Heart is regular rate and rhythm without ectopy.  Lungs are clear to auscultation.  Chest wall is nontender to palpation.  Abdomen is soft, nontender, nondistended with normal active bowel sounds.  There is pitting edema of both  lower extremities and sacrum. No focal motor or sensory deficits.  Speech is clear and appropriate    Assessment: 89-year-old female presents with an isolated episode of epistaxis without associated symptoms of shortness of breath or chest pain.  Differential diagnosis includes was not limited to blood in sputum secondary to epistaxis, pharyngeal neoplasm, lung neoplasm, pneumonia, pulmonary embolism.      Plan: Will check D-dimer, EKG, chest x-ray.  If D-dimer is elevated may need to proceed to CTA of the chest.  Patient is hemodynamically stable and without symptoms.  Will also check extremity venous duplex due to patient having bilateral lower extremity edema.  Critical Care Time  Procedures

## 2024-03-13 ENCOUNTER — TELEPHONE (OUTPATIENT)
Age: 89
End: 2024-03-13

## 2024-03-13 NOTE — TELEPHONE ENCOUNTER
Spoke with Mila to schedule appt per pulm. She is going to call another time and schedule, stated she no longer has the cough. She will be out of town for a loss in her family. Will defer referral to give her time.

## 2024-10-17 ENCOUNTER — APPOINTMENT (EMERGENCY)
Dept: CT IMAGING | Facility: HOSPITAL | Age: 89
DRG: 069 | End: 2024-10-17
Payer: COMMERCIAL

## 2024-10-17 ENCOUNTER — HOSPITAL ENCOUNTER (INPATIENT)
Facility: HOSPITAL | Age: 89
LOS: 1 days | Discharge: HOME | DRG: 069 | End: 2024-10-18
Attending: EMERGENCY MEDICINE | Admitting: INTERNAL MEDICINE
Payer: COMMERCIAL

## 2024-10-17 DIAGNOSIS — I10 HYPERTENSION: ICD-10-CM

## 2024-10-17 DIAGNOSIS — I63.9 STROKE (CEREBRUM) (HCC): ICD-10-CM

## 2024-10-17 DIAGNOSIS — G45.9 TIA (TRANSIENT ISCHEMIC ATTACK): ICD-10-CM

## 2024-10-17 DIAGNOSIS — R29.90 STROKE-LIKE SYMPTOMS: ICD-10-CM

## 2024-10-17 DIAGNOSIS — R51.9 ACUTE HEADACHE: ICD-10-CM

## 2024-10-17 DIAGNOSIS — R20.2 PARESTHESIA OF LEFT ARM AND LEG: Primary | ICD-10-CM

## 2024-10-17 LAB
2HR DELTA HS TROPONIN: 1 NG/L
4HR DELTA HS TROPONIN: 3 NG/L
ALBUMIN SERPL BCG-MCNC: 3.9 G/DL (ref 3.5–5)
ALP SERPL-CCNC: 97 U/L (ref 34–104)
ALT SERPL W P-5'-P-CCNC: 12 U/L (ref 7–52)
ANION GAP SERPL CALCULATED.3IONS-SCNC: 8 MMOL/L (ref 4–13)
APTT PPP: 27 SECONDS (ref 23–34)
AST SERPL W P-5'-P-CCNC: 19 U/L (ref 13–39)
BILIRUB DIRECT SERPL-MCNC: 0.17 MG/DL (ref 0–0.2)
BILIRUB SERPL-MCNC: 0.73 MG/DL (ref 0.2–1)
BILIRUB UR QL STRIP: NEGATIVE
BUN SERPL-MCNC: 14 MG/DL (ref 5–25)
CALCIUM SERPL-MCNC: 9 MG/DL (ref 8.4–10.2)
CARDIAC TROPONIN I PNL SERPL HS: 3 NG/L
CARDIAC TROPONIN I PNL SERPL HS: 4 NG/L
CARDIAC TROPONIN I PNL SERPL HS: 6 NG/L
CHLORIDE SERPL-SCNC: 104 MMOL/L (ref 96–108)
CLARITY UR: CLEAR
CO2 SERPL-SCNC: 27 MMOL/L (ref 21–32)
COLOR UR: YELLOW
CREAT SERPL-MCNC: 0.86 MG/DL (ref 0.6–1.3)
ERYTHROCYTE [DISTWIDTH] IN BLOOD BY AUTOMATED COUNT: 13.6 % (ref 11.6–15.1)
GFR SERPL CREATININE-BSD FRML MDRD: 60 ML/MIN/1.73SQ M
GLUCOSE SERPL-MCNC: 104 MG/DL (ref 65–140)
GLUCOSE SERPL-MCNC: 99 MG/DL (ref 65–140)
GLUCOSE UR STRIP-MCNC: NEGATIVE MG/DL
HCT VFR BLD AUTO: 37.2 % (ref 34.8–46.1)
HGB BLD-MCNC: 12.4 G/DL (ref 11.5–15.4)
HGB UR QL STRIP.AUTO: NEGATIVE
INR PPP: 0.98 (ref 0.85–1.19)
KETONES UR STRIP-MCNC: NEGATIVE MG/DL
LEUKOCYTE ESTERASE UR QL STRIP: NEGATIVE
MAGNESIUM SERPL-MCNC: 1.9 MG/DL (ref 1.9–2.7)
MCH RBC QN AUTO: 30.1 PG (ref 26.8–34.3)
MCHC RBC AUTO-ENTMCNC: 33.3 G/DL (ref 31.4–37.4)
MCV RBC AUTO: 90 FL (ref 82–98)
NITRITE UR QL STRIP: NEGATIVE
PH UR STRIP.AUTO: 7 [PH]
PLATELET # BLD AUTO: 209 THOUSANDS/UL (ref 149–390)
PMV BLD AUTO: 11.3 FL (ref 8.9–12.7)
POTASSIUM SERPL-SCNC: 3.7 MMOL/L (ref 3.5–5.3)
PROT SERPL-MCNC: 7 G/DL (ref 6.4–8.4)
PROT UR STRIP-MCNC: NEGATIVE MG/DL
PROTHROMBIN TIME: 13.4 SECONDS (ref 12.3–15)
RBC # BLD AUTO: 4.12 MILLION/UL (ref 3.81–5.12)
SODIUM SERPL-SCNC: 139 MMOL/L (ref 135–147)
SP GR UR STRIP.AUTO: <=1.005 (ref 1–1.03)
TSH SERPL DL<=0.05 MIU/L-ACNC: 0.97 UIU/ML (ref 0.45–4.5)
UROBILINOGEN UR QL STRIP.AUTO: 1 E.U./DL
WBC # BLD AUTO: 5.66 THOUSAND/UL (ref 4.31–10.16)

## 2024-10-17 PROCEDURE — 80076 HEPATIC FUNCTION PANEL: CPT | Performed by: EMERGENCY MEDICINE

## 2024-10-17 PROCEDURE — 70498 CT ANGIOGRAPHY NECK: CPT

## 2024-10-17 PROCEDURE — 83735 ASSAY OF MAGNESIUM: CPT | Performed by: EMERGENCY MEDICINE

## 2024-10-17 PROCEDURE — 70496 CT ANGIOGRAPHY HEAD: CPT

## 2024-10-17 PROCEDURE — 84443 ASSAY THYROID STIM HORMONE: CPT | Performed by: EMERGENCY MEDICINE

## 2024-10-17 PROCEDURE — 85027 COMPLETE CBC AUTOMATED: CPT | Performed by: EMERGENCY MEDICINE

## 2024-10-17 PROCEDURE — 36415 COLL VENOUS BLD VENIPUNCTURE: CPT | Performed by: EMERGENCY MEDICINE

## 2024-10-17 PROCEDURE — 93005 ELECTROCARDIOGRAM TRACING: CPT

## 2024-10-17 PROCEDURE — 99223 1ST HOSP IP/OBS HIGH 75: CPT | Performed by: INTERNAL MEDICINE

## 2024-10-17 PROCEDURE — 99204 OFFICE O/P NEW MOD 45 MIN: CPT | Performed by: PSYCHIATRY & NEUROLOGY

## 2024-10-17 PROCEDURE — 85730 THROMBOPLASTIN TIME PARTIAL: CPT | Performed by: EMERGENCY MEDICINE

## 2024-10-17 PROCEDURE — 99285 EMERGENCY DEPT VISIT HI MDM: CPT | Performed by: EMERGENCY MEDICINE

## 2024-10-17 PROCEDURE — 82948 REAGENT STRIP/BLOOD GLUCOSE: CPT

## 2024-10-17 PROCEDURE — 99285 EMERGENCY DEPT VISIT HI MDM: CPT

## 2024-10-17 PROCEDURE — 84484 ASSAY OF TROPONIN QUANT: CPT | Performed by: INTERNAL MEDICINE

## 2024-10-17 PROCEDURE — 81003 URINALYSIS AUTO W/O SCOPE: CPT | Performed by: EMERGENCY MEDICINE

## 2024-10-17 PROCEDURE — 84484 ASSAY OF TROPONIN QUANT: CPT | Performed by: EMERGENCY MEDICINE

## 2024-10-17 PROCEDURE — 85610 PROTHROMBIN TIME: CPT | Performed by: EMERGENCY MEDICINE

## 2024-10-17 PROCEDURE — 80048 BASIC METABOLIC PNL TOTAL CA: CPT | Performed by: EMERGENCY MEDICINE

## 2024-10-17 RX ORDER — ENOXAPARIN SODIUM 100 MG/ML
40 INJECTION SUBCUTANEOUS DAILY
Status: DISCONTINUED | OUTPATIENT
Start: 2024-10-18 | End: 2024-10-18 | Stop reason: HOSPADM

## 2024-10-17 RX ORDER — ASPIRIN 81 MG/1
81 TABLET, CHEWABLE ORAL DAILY
Status: DISCONTINUED | OUTPATIENT
Start: 2024-10-18 | End: 2024-10-18 | Stop reason: HOSPADM

## 2024-10-17 RX ORDER — PANTOPRAZOLE SODIUM 40 MG/1
40 TABLET, DELAYED RELEASE ORAL
Status: DISCONTINUED | OUTPATIENT
Start: 2024-10-18 | End: 2024-10-18 | Stop reason: HOSPADM

## 2024-10-17 RX ORDER — ATORVASTATIN CALCIUM 40 MG/1
40 TABLET, FILM COATED ORAL EVERY EVENING
Status: DISCONTINUED | OUTPATIENT
Start: 2024-10-17 | End: 2024-10-18 | Stop reason: HOSPADM

## 2024-10-17 RX ORDER — CLOPIDOGREL BISULFATE 75 MG/1
300 TABLET ORAL ONCE
Status: COMPLETED | OUTPATIENT
Start: 2024-10-17 | End: 2024-10-17

## 2024-10-17 RX ORDER — ASPIRIN 81 MG/1
81 TABLET, CHEWABLE ORAL DAILY
Status: ON HOLD | COMMUNITY
End: 2024-10-18

## 2024-10-17 RX ORDER — CLOPIDOGREL BISULFATE 75 MG/1
75 TABLET ORAL DAILY
Status: DISCONTINUED | OUTPATIENT
Start: 2024-10-18 | End: 2024-10-18 | Stop reason: HOSPADM

## 2024-10-17 RX ORDER — CYANOCOBALAMIN 1000 UG/ML
1000 INJECTION, SOLUTION INTRAMUSCULAR; SUBCUTANEOUS
COMMUNITY
Start: 2024-09-23

## 2024-10-17 RX ADMIN — ATORVASTATIN CALCIUM 40 MG: 40 TABLET, FILM COATED ORAL at 19:49

## 2024-10-17 RX ADMIN — CLOPIDOGREL 300 MG: 75 TABLET ORAL at 17:42

## 2024-10-17 RX ADMIN — IOHEXOL 85 ML: 350 INJECTION, SOLUTION INTRAVENOUS at 17:12

## 2024-10-17 NOTE — CONSULTS
"Consultation - Neurology   Name: Mila Alejandro 89 y.o. female I MRN: [id number]  Unit/Bed#: -01 I Date of Admission: 10/17/2024   Date of Service: 10/18/2024 I Hospital Day: 1   Consult to Neurology  Consult performed by: Magdaleno Whaley DO  Consult ordered by: Carl Ramírez MD        Physician Requesting Evaluation: Saad Vo MD   Reason for Evaluation / Principal Problem: Stroke alert    Assessment & Plan  Stroke-like symptoms  Stroke versus TIA versus hypertensive urgency.  - Recommend loading with Plavix 300 mg once and continuing 75 mg daily in addition to patient's home dose aspirin 81 mg daily.  - Admit for stroke workup  - Permissive hypertension up to systolic 200  GERD (gastroesophageal reflux disease)    Hypertension        Mila Alejandro will need follow up in in 6 weeks with neurovascular attending. She will not require outpatient neurological testing.    History of Present Illness   Mila Alejandro is a 89 y.o. 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  Patient reports around 3 PM having rather sudden onset of right sided headache and left arm and leg numbness and tingling.  Patient also reports feeling generally unwell.  All of her symptoms are improving, patient currently denies headache or any numbness.  Still reports that the right hand feels a bit \"off\"  -Accelerated hypertension on arrival at 194/95 which normalized on its own  - Loaded with Plavix 200 mg once in the ER and continued on 75 mg daily in addition to patient's home dose aspirin 81 mg daily.    Called by  regarding stroke alert at 4:58 PM, neuro response was immediate.  - NIHSS 1 for decreased sensation in the left lower extremity  - LKW 3 PM    CTH & " CTA were unremarkable for any acute pathology, LVO, or other IR target.     IV thrombolysis was not offered due to resolving and nondisabling symptoms.      Review of Systems   Constitutional:  Negative for chills and fever.   HENT:  Negative for facial swelling and hearing loss.    Eyes:  Negative for pain and discharge.   Respiratory:  Negative for cough, choking and shortness of breath.    Cardiovascular:  Negative for chest pain.   Gastrointestinal:  Negative for constipation, diarrhea, nausea and vomiting.   Endocrine: Negative for cold intolerance and heat intolerance.   Genitourinary:  Negative for difficulty urinating, frequency and urgency.   Skin:  Negative for color change and rash.   Neurological:  Positive for numbness. Negative for dizziness, facial asymmetry, weakness, light-headedness and headaches.   All other systems reviewed and are negative.       I have reviewed the patient's PMH, PSH, Social History, Family History, Meds, and Allergies    Objective :  Temp:  [97.8 °F (36.6 °C)-98.5 °F (36.9 °C)] 98.2 °F (36.8 °C)  HR:  [64-76] 76  BP: (105-196)/() 153/74  Resp:  [12-29] 18  SpO2:  [95 %-100 %] 98 %  O2 Device: None (Room air)    Physical Exam  Constitutional:       General: She is not in acute distress.     Appearance: Normal appearance. She is normal weight. She is not ill-appearing, toxic-appearing or diaphoretic.   HENT:      Head: Normocephalic.      Right Ear: External ear normal.      Left Ear: External ear normal.   Eyes:      General: Lids are normal. No scleral icterus.        Right eye: No discharge.         Left eye: No discharge.      Extraocular Movements: Extraocular movements intact.      Conjunctiva/sclera: Conjunctivae normal.      Pupils: Pupils are equal, round, and reactive to light.   Pulmonary:      Effort: Pulmonary effort is normal. No respiratory distress.      Breath sounds: No stridor.   Skin:     Coloration: Skin is not jaundiced or pale.   Neurological:       General: No focal deficit present.      Mental Status: She is alert.      Cranial Nerves: No cranial nerve deficit.      Sensory: No sensory deficit.      Motor: No weakness.      Coordination: Coordination normal.   Psychiatric:         Mood and Affect: Mood normal.         Speech: Speech normal.         Behavior: Behavior normal.         Thought Content: Thought content normal.         Judgment: Judgment normal.     Neurological Exam  Mental Status  Alert. Oriented to person, place and time. Speech is normal. Language is fluent with no aphasia. Attention and concentration are normal.    Cranial Nerves  CN II: Visual acuity is normal. Visual fields full to confrontation.  CN III, IV, VI: Extraocular movements intact bilaterally. Normal lids and orbits bilaterally. Pupils equal round and reactive to light bilaterally.  CN V: Facial sensation is normal.  CN VII: Full and symmetric facial movement.  CN VIII: Hearing is normal.  CN IX, X: Palate elevates symmetrically. Normal gag reflex.  CN XI: Shoulder shrug strength is normal.  CN XII: Tongue midline without atrophy or fasciculations.    Motor  Normal muscle bulk throughout.  Patient moves all 4 extremities equally without difficulty or drift.    Coordination  Right: Finger-to-nose normal.  Initial finger-to-nose was a bit clumsy in the left upper extremity however patient still have a blood pressure cuff on and an IV in the antecubital fossa, unclear how reliable this portion of the exam is..        Lab Results: I have reviewed the following results:I have personally reviewed pertinent reports.    Recent Labs     10/17/24  1710 10/18/24  0426   WBC 5.66 4.34   HGB 12.4 11.7   HCT 37.2 35.0    183   SODIUM 139 140   K 3.7 3.7    106   CO2 27 28   BUN 14 11   CREATININE 0.86 0.74   GLUC 99 93   MG 1.9  --      Imaging Results Review: I personally reviewed the following image studies/reports in PACS and discussed pertinent findings with Radiology: CT head  and CT angio head and neck. My interpretation of the radiology images/reports is: As reported above.  Other Study Results Review: No additional pertinent studies reviewed.    VTE Prophylaxis: VTE covered by:  enoxaparin, Subcutaneous, 40 mg at 10/18/24 0937        Administrative Statements   I have spent a total time of 35 minutes in caring for this patient on the day of the visit/encounter including Diagnostic results, Instructions for management, Patient and family education, Risk factor reductions, Impressions, Counseling / Coordination of care, Documenting in the medical record, Reviewing / ordering tests, medicine, procedures  , Obtaining or reviewing history  , and Communicating with other healthcare professionals .  VIRTUAL CARE DOCUMENTATION:     1. This service was provided via Telemedicine using [employer]     2. Parties in the room with patient during teleconsult Family member: Daughter     3. Confidentiality My office door was closed     4. Participants No one else was in the room    5. Patient acknowledged consent and understanding of privacy and security of the  Telemedicine consult. I informed the patient that I have reviewed their record in Epic and presented the opportunity for them to ask any questions regarding the visit today.  The patient agreed to participate.    6. I have spent a total time of 35 minutes in caring for this patient on the day of the visit/encounter including  as outlined above .

## 2024-10-17 NOTE — ASSESSMENT & PLAN NOTE
Patient has history of hypertension.  Currently not on any medications.  Blood pressure in 190s-200s in ED.  Permissive hypertension for now.  Can use as needed hydralazine/labetalol for blood pressure greater than 220/110.  Will need better blood pressure management if continues to be elevated.

## 2024-10-17 NOTE — ED PROVIDER NOTES
Time reflects when diagnosis was documented in both MDM as applicable and the Disposition within this note       Time User Action Codes Description Comment    10/17/2024  5:06 PM Carl Ramírez Add [R20.2] Paresthesia of left arm and leg     10/17/2024  6:14 PM Carl Ramírez Add [R51.9] Acute headache     10/17/2024  6:34 PM Saad Vo Add [R29.90] Stroke-like symptoms           ED Disposition       ED Disposition   Admit    Condition   Stable    Date/Time   Thu Oct 17, 2024  6:14 PM    Comment   Case was discussed with LLUVIA and the patient's admission status was agreed to be Admission Status: inpatient status to the service of Dr. Vo .               Assessment & Plan       Medical Decision Making  89-year-old female presents with sudden onset headache and left-sided paresthesias, with no other complaints, with a history of previous TIA, is currently taking for, has no other symptoms of stroke, but her symptoms are concerning and a stroke alert will be called, and the patient will be discussed with neurology.  The patient does not appear to have any bleeding on a CT head Noncon, and she reports that her symptoms are resolving, the patient was discussed neurology and due to the rapidly resolving symptoms and low NIH scale, he believes the patient is appropriate for admitting the patient under the stroke pathway, does not believe TNK is appropriate this time, and I agree with this.  The patient will be started on Plavix and will be admitted for further management.    Amount and/or Complexity of Data Reviewed  Labs: ordered.  Radiology: ordered.    Risk  Prescription drug management.  Decision regarding hospitalization.             Medications   aspirin chewable tablet 81 mg (has no administration in time range)   clopidogrel (PLAVIX) tablet 75 mg (has no administration in time range)   pantoprazole (PROTONIX) EC tablet 40 mg (has no administration in time range)   atorvastatin (LIPITOR)  tablet 40 mg (has no administration in time range)   enoxaparin (LOVENOX) subcutaneous injection 40 mg (has no administration in time range)   iohexol (OMNIPAQUE) 350 MG/ML injection (MULTI-DOSE) 85 mL (85 mL Intravenous Given 10/17/24 1712)   clopidogrel (PLAVIX) tablet 300 mg (300 mg Oral Given 10/17/24 1742)       ED Risk Strat Scores       Stroke Assessment       Row Name 10/17/24 2517             NIH Stroke Scale    Interval Baseline      Level of Consciousness (1a.) 0      LOC Questions (1b.) 0      LOC Commands (1c.) 0      Best Gaze (2.) 0      Visual (3.) 0  Previous complete loss of vision in the right eye, vision is otherwise at baseline      Facial Palsy (4.) 0      Motor Arm, Left (5a.) 0      Motor Arm, Right (5b.) 0      Motor Leg, Left (6a.) 0      Motor Leg, Right (6b.) 0      Limb Ataxia (7.) 0      Sensory (8.) 1      Best Language (9.) 0      Dysarthria (10.) 0      Extinction and Inattention (11.) (Formerly Neglect) 0      Total 1                    Flowsheet Row Most Recent Value   Thrombolytic Decision Options    Thrombolytic Decision Patient not a candidate.   Patient is not a candidate options Symptoms resolved/clearly non disabling.            SBIRT 20yo+      Flowsheet Row Most Recent Value   Initial Alcohol Screen: US AUDIT-C     1. How often do you have a drink containing alcohol? 0 Filed at: 10/17/2024 1650   2. How many drinks containing alcohol do you have on a typical day you are drinking?  0 Filed at: 10/17/2024 1650   3a. Male UNDER 65: How often do you have five or more drinks on one occasion? 0 Filed at: 10/17/2024 1650   3b. FEMALE Any Age, or MALE 65+: How often do you have 4 or more drinks on one occassion? 0 Filed at: 10/17/2024 1650   Audit-C Score 0 Filed at: 10/17/2024 1650   DIMITRIS: How many times in the past year have you...    Used an illegal drug or used a prescription medication for non-medical reasons? Never Filed at: 10/17/2024 1650                            History  "of Present Illness       Chief Complaint   Patient presents with    Numbness     At 3pm this afternoon, states she got \"a funny pain in my head and my left arm and leg got numb\".  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     Spinal meningioma (HCC)     Thyroid disorder       Past Surgical History:   Procedure Laterality Date    APPENDECTOMY      BACK SURGERY      Posterior cervical thoracic surgery with fusion for resection of T1-2 meningioma (IDEM), WHO 1    BREAST BIOPSY Right     benign    BREAST SURGERY      GALLBLADDER SURGERY      HERNIA REPAIR      POSTERIOR CERVICAL LAMINECTOMY      WV XCAPSL CTRC RMVL INSJ IO LENS PROSTH W/O ECP Left 06/24/2019    Procedure: EXTRACTION EXTRACAPSULAR CATARACT PHACO INTRAOCULAR LENS (IOL);  Surgeon: Jose Luis Howard MD;  Location: [facility] MAIN OR;  Service: Ophthalmology    THYROID SURGERY      US GUIDANCE  01/27/2017    VARICOSE VEIN SURGERY        Family History   Problem Relation Age of Onset    Cancer Mother     Alzheimer's disease Mother     Heart disease Father     No Known Problems Daughter     No Known Problems Daughter     No Known Problems Maternal Grandmother     No Known Problems Maternal Grandfather     No Known Problems Paternal Grandmother     No Known Problems Paternal Grandfather     Heart disease Brother     No Known Problems Son     No Known Problems Son     Breast cancer Neg Hx       Social History     Tobacco Use    Smoking status: Never    Smokeless tobacco: Never   Vaping Use    Vaping status: Never Used   Substance Use Topics    Alcohol use: Yes     Comment: rare    Drug use: No      E-Cigarette/Vaping    E-Cigarette Use Never User       E-Cigarette/Vaping Substances    Nicotine No     THC No     CBD No     Flavoring No     Other No  "    Unknown No       I have reviewed and agree with the history as documented.     89-year-old female presents with a right-sided headache that came on suddenly, with left upper extremity paresthesias, that started prominently 1500 today, she reports that the headache has mostly resolved as well as the paresthesias, but he was told to come in for further evaluation.  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  The patient is going to baby aspirin every day, no other blood thinners        Review of Systems   Constitutional:  Negative for fever.   HENT:  Negative for congestion.    Eyes:  Negative for visual disturbance.   Respiratory:  Negative for cough and shortness of breath.    Cardiovascular:  Negative for chest pain.   Gastrointestinal:  Negative for abdominal pain, constipation, diarrhea, nausea and vomiting.   Endocrine: Negative for polyuria.   Genitourinary:  Negative for dysuria and hematuria.   Musculoskeletal:  Negative for myalgias.   Neurological:  Positive for numbness and headaches. Negative for dizziness.           Objective       ED Triage Vitals [10/17/24 1651]   Temperature Pulse Blood Pressure Respirations SpO2 Patient Position - Orthostatic VS   98.2 °F (36.8 °C) 73 (!) 194/95 12 95 % Lying      Temp Source Heart Rate Source BP Location FiO2 (%) Pain Score    Oral -- Left arm -- No Pain      Vitals      Date and Time Temp Pulse SpO2 Resp BP Pain Score FACES Pain Rating User   10/17/24 1916 -- -- -- -- -- No Pain -- AL   10/17/24 1909 98.5 °F (36.9 °C) 64 95 % 20 182/72 -- -- CT    10/17/24 1830 -- 75 -- 18 196/92 -- -- DB   10/17/24 1730 -- 76 97 % 29 163/104 -- -- DB   10/17/24 1651 98.2 °F (36.8 °C) 73 95 % 12 194/95 No Pain -- RR            Physical Exam  Vitals and nursing note reviewed.   Constitutional:       General: She is not in acute distress.     Appearance: Normal appearance. She is well-developed.   HENT:      Head: Normocephalic and atraumatic.   Eyes:      Extraocular Movements: Extraocular movements intact.      Conjunctiva/sclera: Conjunctivae normal.      Comments: Right eye is completely opacified, with mostly intact conjugate movement of both eyes   Cardiovascular:      Rate and Rhythm: Normal rate and regular rhythm.   Pulmonary:      Effort: Pulmonary effort is normal. No respiratory distress.      Breath sounds: Normal breath sounds.   Abdominal:      General: There is no distension.      Palpations: Abdomen is soft.      Tenderness: There is no abdominal tenderness.   Musculoskeletal:         General: No swelling.      Cervical back: Normal range of motion.   Skin:     General: Skin is warm and dry.      Capillary Refill: Capillary refill takes less than 2 seconds.   Neurological:      General: No focal deficit present.      Mental Status: She is alert and oriented to person, place, and time.      Comments: Cranial nerves II through XII intact, equal strength in all extremities, she does note a decrease sensation in her left lower extremity when compared to her right,   Psychiatric:         Mood and Affect: Mood normal.         Results Reviewed       Procedure Component Value Units Date/Time    HS Troponin I 2hr [[id number]] Collected: 10/17/24 1955    Lab Status: No result Specimen: Blood from Arm, Left     HS Troponin I 4hr [[id number]]     Lab Status: No result Specimen: Blood     Magnesium [[id number]]  (Normal) Collected: 10/17/24 1710    Lab Status: Final result Specimen: Blood from Arm, Right Updated: 10/17/24 1808     Magnesium 1.9 mg/dL     Hepatic function  panel [[id number]]  (Normal) Collected: 10/17/24 1710    Lab Status: Final result Specimen: Blood from Arm, Right Updated: 10/17/24 1808     Total Bilirubin 0.73 mg/dL      Bilirubin, Direct 0.17 mg/dL      Alkaline Phosphatase 97 U/L      AST 19 U/L      ALT 12 U/L      Total Protein 7.0 g/dL      Albumin 3.9 g/dL     UA (URINE) with reflex to Scope [[id number]]  (Normal) Collected: 10/17/24 1758    Lab Status: Final result Specimen: Urine, Clean Catch Updated: 10/17/24 1805     Color, UA Yellow     Clarity, UA Clear     Specific Gravity, UA <=1.005     pH, UA 7.0     Leukocytes, UA Negative     Nitrite, UA Negative     Protein, UA Negative mg/dl      Glucose, UA Negative mg/dl      Ketones, UA Negative mg/dl      Urobilinogen, UA 1.0 E.U./dl      Bilirubin, UA Negative     Occult Blood, UA Negative    TSH [[id number]]  (Normal) Collected: 10/17/24 1710    Lab Status: Final result Specimen: Blood from Arm, Right Updated: 10/17/24 1758     TSH 3RD GENERATON 0.974 uIU/mL     Basic metabolic panel [[id number]] Collected: 10/17/24 1710    Lab Status: Final result Specimen: Blood from Arm, Right Updated: 10/17/24 1747     Sodium 139 mmol/L      Potassium 3.7 mmol/L      Chloride 104 mmol/L      CO2 27 mmol/L      ANION GAP 8 mmol/L      BUN 14 mg/dL      Creatinine 0.86 mg/dL      Glucose 99 mg/dL      Calcium 9.0 mg/dL      eGFR 60 ml/min/1.73sq m     Narrative:      National Kid[employer] Disease Foundation guidelines for Chronic Kidney Disease (CKD):     Stage 1 with normal or high GFR (GFR > 90 mL/min/1.73 square meters)    Stage 2 Mild CKD (GFR = 60-89 mL/min/1.73 square meters)    Stage 3A Moderate CKD (GFR = 45-59 mL/min/1.73 square meters)    Stage 3B Moderate CKD (GFR = 30-44 mL/min/1.73 square meters)    Stage 4 Severe CKD (GFR = 15-29 mL/min/1.73 square meters)    Stage 5 End Stage CKD (GFR <15 mL/min/1.73 square meters)  Note: GFR calculation is accurate only with a steady state creatinine    HS Troponin 0hr (reflex  protocol) [[id number]]  (Normal) Collected: 10/17/24 1710    Lab Status: Final result Specimen: Blood from Arm, Right Updated: 10/17/24 1744     hs TnI 0hr 3 ng/L     Protime-INR [[id number]]  (Normal) Collected: 10/17/24 1710    Lab Status: Final result Specimen: Blood from Arm, Right Updated: 10/17/24 1726     Protime 13.4 seconds      INR 0.98    Narrative:      INR Therapeutic Range    Indication                                             INR Range      Atrial Fibrillation                                               2.0-3.0  Hypercoagulable State                                    2.0.2.3  Left Ventricular Asist Device                            2.0-3.0  Mechanical Heart Valve                                  -    Aortic(with afib, MI, embolism, HF, LA enlargement,    and/or coagulopathy)                                     2.0-3.0 (2.5-3.5)     Mitral                                                             2.5-3.5  Prosthetic/Bioprosthetic Heart Valve               2.0-3.0  Venous thromboembolism (VTE: VT, PE        2.0-3.0    APTT [[id number]]  (Normal) Collected: 10/17/24 1710    Lab Status: Final result Specimen: Blood from Arm, Right Updated: 10/17/24 1726     PTT 27 seconds     CBC and Platelet [[id number]]  (Normal) Collected: 10/17/24 1710    Lab Status: Final result Specimen: Blood from Arm, Right Updated: 10/17/24 1717     WBC 5.66 Thousand/uL      RBC 4.12 Million/uL      Hemoglobin 12.4 g/dL      Hematocrit 37.2 %      MCV 90 fL      MCH 30.1 pg      MCHC 33.3 g/dL      RDW 13.6 %      Platelets 209 Thousands/uL      MPV 11.3 fL     Fingerstick Glucose (POCT) [[id number]]  (Normal) Collected: 10/17/24 1658    Lab Status: Final result Specimen: Blood Updated: 10/17/24 1700     POC Glucose 104 mg/dl             CTA stroke alert (head/neck)   Final Interpretation by Guillermo Meyers MD (10/17 1735)      CTA head: Negative for large vessel intracranial occlusion or hemodynamically significant  stenosis.      CTA neck:  No extracranial carotid stenosis.  The cervical vertebral arteries are patent.      Redemonstrated right hemithyroidectomy. Enlarged, heterogeneous left thyroid lobe with underlying ill-defined hypodense nodules. Management based on JACR guidelines.      Findings were directly discussed with Magdaleno Whaley MD at 5:34 p.m.      Workstation performed: [id number]         CT stroke alert brain   Final Interpretation by Guillermo Meyers MD (10/17 1722)      No acute intracranial abnormality.      Stable partially calcified meningioma along the high right frontal convexity.         Workstation performed: [id number]         MRI Inpatient Order    (Results Pending)       ECG 12 Lead Documentation Only    Date/Time: 10/17/2024 5:32 PM    Performed by: Carl Ramírez MD  Authorized by: Carl Ramírez MD    ECG reviewed by me, the ED Provider: yes    Patient location:  ED  Previous ECG:     Previous ECG:  Compared to current    Similarity:  No change  Interpretation:     Interpretation: normal    Rate:     ECG rate:  80    ECG rate assessment: normal    Rhythm:     Rhythm: sinus rhythm    Ectopy:     Ectopy: PAC    QRS:     QRS axis:  Normal    QRS intervals:  Normal  Conduction:     Conduction: normal    ST segments:     ST segments:  Normal  T waves:     T waves: normal        ED Medication and Procedure Management   Prior to Admission Medications   Prescriptions Last Dose Informant Patient Reported? Taking?   aspirin 81 mg chewable tablet 10/17/2024  Yes Yes   Sig: Chew 81 mg daily   clopidogrel (PLAVIX) 75 mg tablet Not Taking Self No No   Sig: Take 1 tablet (75 mg total) by mouth daily   Patient not taking: Reported on 10/17/2024   cyanocobalamin 1,000 mcg/mL Past Month  Yes Yes   Sig: Inject 1,000 mcg into a muscle every 30 (thirty) days Next dose is due on the 19th of October   lisinopril (ZESTRIL) 5 mg tablet Not Taking  No No   Sig: Take 1 tablet (5 mg total) by mouth  daily   Patient not taking: Reported on 10/17/2024   pantoprazole (PROTONIX) 40 mg tablet 10/17/2024 Self Yes Yes   Sig: Take 1 tablet by mouth daily      Facility-Administered Medications: None     Current Discharge Medication List        CONTINUE these medications which have NOT CHANGED    Details   aspirin 81 mg chewable tablet Chew 81 mg daily      cyanocobalamin 1,000 mcg/mL Inject 1,000 mcg into a muscle every 30 (thirty) days Next dose is due on the 19th of October      pantoprazole (PROTONIX) 40 mg tablet Take 1 tablet by mouth daily      clopidogrel (PLAVIX) 75 mg tablet Take 1 tablet (75 mg total) by mouth daily  Qty: 30 tablet, Refills: 0    Associated Diagnoses: Stroke (cerebrum) (HCC); TIA (transient ischemic attack)      lisinopril (ZESTRIL) 5 mg tablet Take 1 tablet (5 mg total) by mouth daily  Qty: 30 tablet, Refills: 0    Associated Diagnoses: Elevated blood pressure reading           No discharge procedures on file.  ED SEPSIS DOCUMENTATION   Time reflects when diagnosis was documented in both MDM as applicable and the Disposition within this note       Time User Action Codes Description Comment    10/17/2024  5:06 PM Carl Ramírez Add [R20.2] Paresthesia of left arm and leg     10/17/2024  6:14 PM Carl Ramírez Add [R51.9] Acute headache     10/17/2024  6:34 PM Saad Vo Add [R29.90] Stroke-like symptoms                  Carl Ramírez MD  10/17/24 1957

## 2024-10-17 NOTE — ASSESSMENT & PLAN NOTE
Presented with funny feeling in the head associated with numbness in left upper and lower extremity.  Most of her symptoms now resolved except for some numbness in left hand.  No other neurological deficits.  Patient has remote history of TIA/small stroke years ago.  Was on Plavix prior but was taken off and switched to aspirin by PCP last December.  Also was on atorvastatin which was discontinued by PCP.  Currently not on any blood pressure medications either.  CT head and CTA head and neck unremarkable.  Stroke alert was called.  Case was discussed with neurology.  Recommended admitting the stroke pathway.    MRI brain  Telemetry  Echocardiogram  Permissive hypertension.  Check lipid panel  Aspirin 81 mg, Plavix loaded in ED per neurorecommendations, 75 mg daily from tomorrow, atorvastatin 40 mg daily.  PT OT evaluation

## 2024-10-17 NOTE — H&P
H&P - Hospitalist   Name: Mila Alejandro 89 y.o. female I MRN: [id number]  Unit/Bed#: ED 03 I Date of Admission: 10/17/2024   Date of Service: 10/17/2024 I Hospital Day: 0     Assessment & Plan  Stroke-like symptoms  Presented with funny feeling in the head associated with numbness in left upper and lower extremity.  Most of her symptoms now resolved except for some numbness in left hand.  No other neurological deficits.  Patient has remote history of TIA/small stroke years ago.  Was on Plavix prior but was taken off and switched to aspirin by PCP last December.  Also was on atorvastatin which was discontinued by PCP.  Currently not on any blood pressure medications either.  CT head and CTA head and neck unremarkable.  Stroke alert was called.  Case was discussed with neurology.  Recommended admitting the stroke pathway.    MRI brain  Telemetry  Echocardiogram  Permissive hypertension.  Check lipid panel  Aspirin 81 mg, Plavix loaded in ED per neurorecommendations, 75 mg daily from tomorrow, atorvastatin 40 mg daily.  PT OT evaluation  GERD (gastroesophageal reflux disease)  Continue Protonix  Hypertension  Patient has history of hypertension.  Currently not on any medications.  Blood pressure in 190s-200s in ED.  Permissive hypertension for now.  Can use as needed hydralazine/labetalol for blood pressure greater than 220/110.  Will need better blood pressure management if continues to be elevated.      VTE Pharmacologic Prophylaxis:   Moderate Risk (Score 3-4) - Pharmacological DVT Prophylaxis Ordered: enoxaparin (Lovenox).  Code Status: Level 1 - Full Code   Discussion with family: Updated  (daughter) at bedside.    Anticipated Length of Stay: Patient will be admitted on an inpatient basis with an anticipated length of stay of greater than 2 midnights secondary to stroke workup.    History of Present Illness   Chief Complaint: left sided numbness    Mila Alejandro is a 89 y.o. female with a PMH of as  below who presents with funny feeling in her head this afternoon associated with numbness in her left upper and lower extremity.  Funny feeling that had resolved spontaneously but the numbness persisted so she presented to ED.  She was out with a friend.  The friend did not notice any changes in her speech or facial appearance.     Currently majority of her symptoms have resolved.  Only has some numbness and left hand    Review of Systems   Constitutional:  Negative for activity change, chills, fatigue and fever.   HENT:  Negative for congestion and sore throat.    Respiratory:  Negative for cough and shortness of breath.    Cardiovascular:  Negative for chest pain and palpitations.   Gastrointestinal:  Negative for abdominal pain, nausea and vomiting.   Genitourinary:  Negative for difficulty urinating, dysuria, flank pain, frequency and urgency.   Musculoskeletal:  Negative for arthralgias and myalgias.   Skin:  Negative for color change and rash.   Neurological:  Positive for numbness and headaches. Negative for dizziness, facial asymmetry, speech difficulty, weakness and light-headedness.   Psychiatric/Behavioral:  Negative for agitation, behavioral problems and confusion.        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     Spinal meningioma (HCC)     Thyroid disorder      Past Surgical History:   Procedure Laterality Date    APPENDECTOMY      BACK SURGERY      Posterior cervical thoracic surgery with fusion for resection of T1-2 meningioma (IDEM), WHO 1    BREAST BIOPSY Right     benign    BREAST SURGERY      GALLBLADDER SURGERY      HERNIA REPAIR      POSTERIOR CERVICAL LAMINECTOMY      TN XCAPSL CTRC RMVL INSJ IO LENS PROSTH W/O ECP Left 06/24/2019    Procedure: EXTRACTION EXTRACAPSULAR CATARACT PHACO INTRAOCULAR  LENS (IOL);  Surgeon: Jose Luis Howard MD;  Location: [facility] MAIN OR;  Service: Ophthalmology    THYROID SURGERY      US GUIDANCE  01/27/2017    VARICOSE VEIN SURGERY       Social History     Tobacco Use    Smoking status: Never    Smokeless tobacco: Never   Vaping Use    Vaping status: Never Used   Substance and Sexual Activity    Alcohol use: Yes     Comment: rare    Drug use: No    Sexual activity: Not on file     E-Cigarette/Vaping    E-Cigarette Use Never User      E-Cigarette/Vaping Substances    Nicotine No     THC No     CBD No     Flavoring No     Other No     Unknown No      Family History   Problem Relation Age of Onset    Cancer Mother     Alzheimer's disease Mother     Heart disease Father     No Known Problems Daughter     No Known Problems Daughter     No Known Problems Maternal Grandmother     No Known Problems Maternal Grandfather     No Known Problems Paternal Grandmother     No Known Problems Paternal Grandfather     Heart disease Brother     No Known Problems Son     No Known Problems Son     Breast cancer Neg Hx      Social History:  Marital Status:    Occupation:   Patient Pre-hospital Living Situation:   Patient Pre-hospital Level of Mobility:   Patient Pre-hospital Diet Restrictions:     Meds/Allergies   I have reviewed home medications with patient personally.  Prior to Admission medications    Medication Sig Start Date End Date Taking? Authorizing Provider   aspirin 81 mg chewable tablet Chew 81 mg daily   Yes Historical Provider, MD   clopidogrel (PLAVIX) 75 mg tablet Take 1 tablet (75 mg total) by mouth daily 7/2/22 1/19/23  Vane Ruffin MD   lisinopril (ZESTRIL) 5 mg tablet Take 1 tablet (5 mg total) by mouth daily 3/4/24 4/3/24  Jo Finley MD   pantoprazole (PROTONIX) 40 mg tablet Take 1 tablet by mouth daily 5/10/20   Historical Provider, MD   atorvastatin (LIPITOR) 20 mg tablet Take 1 tablet (20 mg total) by mouth daily 3/23/21 10/17/24  Vikram Cannon MD   Cyanocobalamin  (B-12) 1000 MCG/ML KIT Inject as directed every 30 (thirty) days   Patient not taking: Reported on 1/19/2023  10/17/24  Historical Provider, MD   Nutritional Supplements (ENSURE NUTRITION SHAKE PO) Take by mouth in the morning  10/17/24  Historical Provider, MD     Allergies   Allergen Reactions    Povidone Iodine Hives    Penicillins Rash       Objective :  Temp:  [98.2 °F (36.8 °C)] 98.2 °F (36.8 °C)  HR:  [73-76] 75  BP: (163-196)/() 196/92  Resp:  [12-29] 18  SpO2:  [95 %-97 %] 97 %  O2 Device: None (Room air)    Physical Exam   Constitutional: Pt appears comfortable. Not in any acute distress.  HENT:   Head: Normocephalic and atraumatic.   Eyes: EOM are normal.   Neck: Neck supple.   Cardiovascular: Normal rate, regular rhythm, normal heart sounds.  No murmur heard.  Pulmonary/Chest: Effort normal, air entry b/l equal. No respiratory distress. Pt has no wheezes or crackles.   Abdominal: Soft. Non-distended, Non-tender. Bowel sounds are normal.   Musculoskeletal: Normal range of motion.   Neurological: awake, alert.  5/5 strength in all extremities.  Mild numbness in left hand.  Cranial nerves II to XII intact.  Psychiatric: normal mood and affect.      Lines/Drains:            Lab Results: I have reviewed the following results:  Results from last 7 days   Lab Units 10/17/24  1710   WBC Thousand/uL 5.66   HEMOGLOBIN g/dL 12.4   HEMATOCRIT % 37.2   PLATELETS Thousands/uL 209     Results from last 7 days   Lab Units 10/17/24  1710   SODIUM mmol/L 139   POTASSIUM mmol/L 3.7   CHLORIDE mmol/L 104   CO2 mmol/L 27   BUN mg/dL 14   CREATININE mg/dL 0.86   ANION GAP mmol/L 8   CALCIUM mg/dL 9.0   ALBUMIN g/dL 3.9   TOTAL BILIRUBIN mg/dL 0.73   ALK PHOS U/L 97   ALT U/L 12   AST U/L 19   GLUCOSE RANDOM mg/dL 99     Results from last 7 days   Lab Units 10/17/24  1710   INR  0.98     Results from last 7 days   Lab Units 10/17/24  1658   POC GLUCOSE mg/dl 104     Lab Results   Component Value Date    HGBA1C 5.6  07/01/2022    HGBA1C 5.6 03/22/2021    HGBA1C 5.7 (H) 07/10/2015                 Administrative Statements       ** Please Note: This note has been constructed using a voice recognition system. **

## 2024-10-18 ENCOUNTER — APPOINTMENT (INPATIENT)
Dept: MRI IMAGING | Facility: HOSPITAL | Age: 89
DRG: 069 | End: 2024-10-18
Payer: COMMERCIAL

## 2024-10-18 VITALS
RESPIRATION RATE: 18 BRPM | TEMPERATURE: 98.2 F | HEART RATE: 76 BPM | OXYGEN SATURATION: 98 % | WEIGHT: 127 LBS | SYSTOLIC BLOOD PRESSURE: 153 MMHG | BODY MASS INDEX: 23.98 KG/M2 | DIASTOLIC BLOOD PRESSURE: 74 MMHG | HEIGHT: 61 IN

## 2024-10-18 PROBLEM — G45.9 TIA (TRANSIENT ISCHEMIC ATTACK): Status: ACTIVE | Noted: 2024-10-17

## 2024-10-18 LAB
ANION GAP SERPL CALCULATED.3IONS-SCNC: 6 MMOL/L (ref 4–13)
ATRIAL RATE: 80 BPM
BUN SERPL-MCNC: 11 MG/DL (ref 5–25)
CALCIUM SERPL-MCNC: 8.6 MG/DL (ref 8.4–10.2)
CHLORIDE SERPL-SCNC: 106 MMOL/L (ref 96–108)
CHOLEST SERPL-MCNC: 179 MG/DL
CO2 SERPL-SCNC: 28 MMOL/L (ref 21–32)
CREAT SERPL-MCNC: 0.74 MG/DL (ref 0.6–1.3)
ERYTHROCYTE [DISTWIDTH] IN BLOOD BY AUTOMATED COUNT: 13.7 % (ref 11.6–15.1)
EST. AVERAGE GLUCOSE BLD GHB EST-MCNC: 114 MG/DL
GFR SERPL CREATININE-BSD FRML MDRD: 72 ML/MIN/1.73SQ M
GLUCOSE SERPL-MCNC: 93 MG/DL (ref 65–140)
HBA1C MFR BLD: 5.6 %
HCT VFR BLD AUTO: 35 % (ref 34.8–46.1)
HDLC SERPL-MCNC: 60 MG/DL
HGB BLD-MCNC: 11.7 G/DL (ref 11.5–15.4)
LDLC SERPL CALC-MCNC: 109 MG/DL (ref 0–100)
MCH RBC QN AUTO: 30.3 PG (ref 26.8–34.3)
MCHC RBC AUTO-ENTMCNC: 33.4 G/DL (ref 31.4–37.4)
MCV RBC AUTO: 91 FL (ref 82–98)
P AXIS: 73 DEGREES
PLATELET # BLD AUTO: 183 THOUSANDS/UL (ref 149–390)
PMV BLD AUTO: 11.3 FL (ref 8.9–12.7)
POTASSIUM SERPL-SCNC: 3.7 MMOL/L (ref 3.5–5.3)
PR INTERVAL: 138 MS
QRS AXIS: 46 DEGREES
QRSD INTERVAL: 86 MS
QT INTERVAL: 384 MS
QTC INTERVAL: 442 MS
RBC # BLD AUTO: 3.86 MILLION/UL (ref 3.81–5.12)
SODIUM SERPL-SCNC: 140 MMOL/L (ref 135–147)
T WAVE AXIS: 45 DEGREES
TRIGL SERPL-MCNC: 50 MG/DL
VENTRICULAR RATE: 80 BPM
WBC # BLD AUTO: 4.34 THOUSAND/UL (ref 4.31–10.16)

## 2024-10-18 PROCEDURE — 83036 HEMOGLOBIN GLYCOSYLATED A1C: CPT | Performed by: INTERNAL MEDICINE

## 2024-10-18 PROCEDURE — 85027 COMPLETE CBC AUTOMATED: CPT | Performed by: INTERNAL MEDICINE

## 2024-10-18 PROCEDURE — 80048 BASIC METABOLIC PNL TOTAL CA: CPT | Performed by: INTERNAL MEDICINE

## 2024-10-18 PROCEDURE — 97530 THERAPEUTIC ACTIVITIES: CPT

## 2024-10-18 PROCEDURE — 97162 PT EVAL MOD COMPLEX 30 MIN: CPT

## 2024-10-18 PROCEDURE — 99239 HOSP IP/OBS DSCHRG MGMT >30: CPT | Performed by: INTERNAL MEDICINE

## 2024-10-18 PROCEDURE — 97167 OT EVAL HIGH COMPLEX 60 MIN: CPT

## 2024-10-18 PROCEDURE — 80061 LIPID PANEL: CPT | Performed by: INTERNAL MEDICINE

## 2024-10-18 PROCEDURE — 70551 MRI BRAIN STEM W/O DYE: CPT

## 2024-10-18 PROCEDURE — 93010 ELECTROCARDIOGRAM REPORT: CPT | Performed by: INTERNAL MEDICINE

## 2024-10-18 RX ORDER — AMLODIPINE BESYLATE 5 MG/1
5 TABLET ORAL DAILY
Qty: 90 TABLET | Refills: 0 | Status: SHIPPED | OUTPATIENT
Start: 2024-10-18

## 2024-10-18 RX ORDER — CLOPIDOGREL BISULFATE 75 MG/1
75 TABLET ORAL DAILY
Qty: 90 TABLET | Refills: 0 | Status: SHIPPED | OUTPATIENT
Start: 2024-10-18

## 2024-10-18 RX ORDER — ATORVASTATIN CALCIUM 10 MG/1
10 TABLET, FILM COATED ORAL EVERY EVENING
Qty: 90 TABLET | Refills: 0 | Status: SHIPPED | OUTPATIENT
Start: 2024-10-18

## 2024-10-18 RX ORDER — ASPIRIN 81 MG/1
81 TABLET, CHEWABLE ORAL DAILY
Qty: 21 TABLET | Refills: 0 | Status: SHIPPED | OUTPATIENT
Start: 2024-10-18 | End: 2024-11-08

## 2024-10-18 RX ADMIN — PANTOPRAZOLE SODIUM 40 MG: 40 TABLET, DELAYED RELEASE ORAL at 06:19

## 2024-10-18 RX ADMIN — ENOXAPARIN SODIUM 40 MG: 40 INJECTION SUBCUTANEOUS at 09:37

## 2024-10-18 RX ADMIN — CLOPIDOGREL 75 MG: 75 TABLET ORAL at 09:37

## 2024-10-18 RX ADMIN — ASPIRIN 81 MG 81 MG: 81 TABLET ORAL at 09:37

## 2024-10-18 NOTE — DISCHARGE INSTR - AVS FIRST PAGE
Dear Mila Alejandro,     It was our pleasure to care for you here at [facility].  It is our hope that we were always able to exceed the expected standards for your care during your stay.  You were hospitalized due to TIA (mini stroke).  You were cared for on the 3rd floor under the service of Saad Vo MD with the [facility]ist [facility] who covers for your primary care physician (PCP), Jack Ortiz MD, while you were hospitalized.  If you have any questions or concerns related to this hospitalization, you may contact us at .  For follow up as well as medication refills, we recommend that you follow up with your primary care physician.  A registered nurse will reach out to you by phone within a few days after your discharge to answer any additional questions that you may have after going home.  However, at this time we provide for you here, the most important instructions / recommendations at discharge:     Notable Medication Adjustments -   Take aspirin and Plavix for 21 days.  Stop aspirin after that and continue taking Plavix  Start taking amlodipine for her blood pressure.  Important follow up information -   Family doctor in 1 week  Please review this entire after visit summary as additional general instructions including medication list, appointments, activity, diet, any pertinent wound care, and other additional recommendations from your care team that may be provided for you.      Sincerely,     Saad Vo MD

## 2024-10-18 NOTE — QUICK NOTE
MRI brain negative for any acute contributory pathology.  - CTA head and neck was unremarkable for any clear hemodynamically significant stenosis.  - Echocardiogram from 2021 demonstrated an EF of 55% with no gross dyskinesis and normal-sized atria.  Current order still pending, this can be completed as an outpatient.  - A1c 5.6  - .      TIA versus hypertensive urgency    Recommend continuing dual antiplatelet therapy for 21 days followed by Plavix monotherapy.  - Recommend Lipitor 10 mg daily.  ()  - Echocardiogram can completed as an outpatient  - Office will call patient to arrange hospital follow-up  - Okay to discharge from neurologic standpoint.  - Please call questions/concerns

## 2024-10-18 NOTE — ASSESSMENT & PLAN NOTE
Blood pressure high on admission.  Improved but again trending up.  Currently not on any medications at home.  Will start patient on amlodipine 5 mg daily on discharge.

## 2024-10-18 NOTE — ASSESSMENT & PLAN NOTE
Presented with funny feeling in the head associated with numbness in left upper and lower extremity.  Most of her symptoms now resolved except for some numbness in left hand.  No other neurological deficits.  Patient has remote history of TIA/small stroke years ago.  Was on Plavix prior but was taken off and switched to aspirin by PCP last December.  Also was on atorvastatin which was discontinued by PCP.  Currently not on any blood pressure medications either.  CT head and CTA head and neck unremarkable.  Stroke alert was called.  Case was discussed with neurology.  Recommended admitting the stroke pathway.    MRI brain -negative for acute infarction  Telemetry -no events on telemetry  Dual antiplatelet aspirin and Plavix for 21 days and then Plavix monotherapy alone.  Start Lipitor 10 mg daily because of elevated LDL  Discussed with on-call neurologist.  Cleared for discharge from neurology standpoint.

## 2024-10-18 NOTE — PLAN OF CARE
Problem: NEUROSENSORY - ADULT  Goal: Achieves stable or improved neurological status  Description: INTERVENTIONS  - Monitor and report changes in neurological status  - Monitor vital signs such as temperature, blood pressure, glucose, and any other labs ordered   - Initiate measures to prevent increased intracranial pressure  - Monitor for seizure activity and implement precautions if appropriate      Outcome: Progressing     Problem: Knowledge Deficit  Goal: Patient/family/caregiver demonstrates understanding of disease process, treatment plan, medications, and discharge instructions  Description: Complete learning assessment and assess knowledge base.  Interventions:  - Provide teaching at level of understanding  - Provide teaching via preferred learning methods  Outcome: Progressing

## 2024-10-18 NOTE — PHYSICAL THERAPY NOTE
PHYSICAL THERAPY Evaluation and Discharge Summary  DATE: 10/18/24  TIME: 9979-4578    NAME:  Mila Alejandro  AGE:   89 y.o.  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     Spinal meningioma (HCC)     Thyroid disorder      Past Surgical History:   Procedure Laterality Date    APPENDECTOMY      BACK SURGERY      Posterior cervical thoracic surgery with fusion for resection of T1-2 meningioma (IDEM), WHO 1    BREAST BIOPSY Right     benign    BREAST SURGERY      GALLBLADDER SURGERY      HERNIA REPAIR      POSTERIOR CERVICAL LAMINECTOMY      AK XCAPSL CTRC RMVL INSJ IO LENS PROSTH W/O ECP Left 06/24/2019    Procedure: EXTRACTION EXTRACAPSULAR CATARACT PHACO INTRAOCULAR LENS (IOL);  Surgeon: Jose Luis Howard MD;  Location: [facility] MAIN OR;  Service: Ophthalmology    THYROID SURGERY      US GUIDANCE  01/27/2017    VARICOSE VEIN SURGERY          10/18/24 1127   PT Last Visit   PT Visit Date 10/18/24   Note Type   Note type Evaluation   Additional Comments 89 y/o presents due to left UE/LE numbness/tingling, HA, and general sense of unwell. Upon assessment: hypertension   Pain Assessment   Pain Assessment Tool 0-10   Pain Score No Pain   Restrictions/Precautions   Weight Bearing Precautions Per Order No   Other Precautions Visual impairment  (right eye blindness)   Home Living   Type of Home Apartment   Home Layout One level  (Pt resides on a 1st floor apt with 0 NICANOR. However pt reports that she goes to the 2nd floor weekly to play bingo and typically takes the steps vs elevator.)   Bathroom Shower/Tub Tub/shower unit   Bathroom Toilet Standard   Bathroom Equipment Grab bars in shower;Shower  "chair   Prior Function   Level of [place] Independent with ADLs;Independent with functional mobility;Needs assistance with IADLS   Lives With Alone   Receives Help From Friend(s);Family   IADLs Family/Friend/Other provides transportation;Independent with meal prep;Independent with medication management   Falls in the last 6 months 0   Vocational Retired   Comments Pt reports that relies on friends/neighbors in building to provide transportation to go grocery shopping/appts.   General   Family/Caregiver Present No   Cognition   Overall Cognitive Status WFL   Arousal/Participation Alert   Orientation Level Oriented X4   Subjective   Subjective Pt reports, she continues to \"feel off, but I can't explain it\" in regards to sensation deficits of LUE/LLE and unwell in her head.   RUE Assessment   RUE Assessment   (5/5 grossly)   LUE Assessment   LUE Assessment   (4+/5 grossly)   RLE Assessment   RLE Assessment   (5/5 grossly)   LLE Assessment   LLE Assessment   (4+/5 grossly)   Coordination   Movements are Fluid and Coordinated 1  (grossly, movements are appropriate.  However, pt does exhibit slightly inaccurate repetitive isolated movements on left side)   Sensation   (Pt reporting left UE/LLE numbness.  However, light touch, deep pressure, and joint position sense are intack.)   Heel to [place] Other (Comment):  (slightly decreased coordination with left foot on right shin)   Transfers   Sit to Stand 7  Independent   Stand to Sit 7  Independent   Ambulation/Elevation   Gait Assistance 7  Independent   Assistive Device None   Distance 250'   Stair Management Assistance 6  Modified independent   Stair Management Technique One rail R;Alternating pattern   Number of Stairs 9   Balance   Static Sitting Normal   Static Standing Normal   Ambulatory Good   Endurance Deficit   Endurance Deficit No   Activity Tolerance   Activity Tolerance Patient tolerated treatment well   Assessment   Problem List Impaired sensation;Decreased " strength   Assessment Orders for PT eval and treat received. Pt's PMHx: right eye blindness, spinal meningioma, cervical thoracic sx, TIA/CVA. Pt exhibits physical deficits noted in problem list above.  Deficits listed do not seem to contribute to causing any acute functional deficits.  During today's session,formal PT evaluation performed.  Pt displaying only very slight abnormal LUE/LLE sensation and strength deficits, which likely contributed to decreased left sided coordination testing.  Despite deficits, pt did not display any significant change in functional mobility, or demonstrate any significant gait change or fall risk potential.  Pt does not appear to need ongoing therapy intervention following discharge. Will discharge pt from caseload.     The -PAC Mobility Score was used to assist in determining pt safety w/ mobility/self care & appropriate d/c recommendations, see above for scores. Patient's clinical presentation is stable  .  From a PT/mobility standpoint given the above findings, DC recommendation is level: No Post Acute Rehabilitation Needs   Barriers to Discharge Decreased caregiver support   Plan   PT Frequency   (Evaluation and Discharge Summary)   Discharge Recommendation   Rehab Resource Intensity Level, PT No post-acute rehabilitation needs   -[facility] Basic Mobility Inpatient   Turning in Flat Bed Without Bedrails 4   Lying on Back to Sitting on Edge of Flat Bed Without Bedrails 4   Moving Bed to Chair 4   Standing Up From Chair Using Arms 4   Walk in Room 4   Climb 3-5 Stairs With Railing 4   Basic Mobility Inpatient Raw Score 24   Basic Mobility Standardized Score 57.68   [facility] Highest Level Of Mobility   -HLM Goal 8: Walk 250 feet or more   -HLM Achieved 8: Walk 250 feet ot more   End of Consult   Patient Position at End of Consult Bedside chair;All needs within reach     The patient's AM-PAC Basic Mobility Inpatient Short Form Raw Score is 24. A Raw score of greater than or  equal to 16 suggests the patient may benefit from discharge to home. Please also refer to the recommendation of the Physical Therapist for safe discharge planning.    Carl Michel, PT, DPT  [place] Licensure #[id number]

## 2024-10-18 NOTE — CASE MANAGEMENT
Case Management Assessment & Discharge Planning Note    Patient name Mila Alejandro  Location /-01 MRN [id number]  : 1935 Date 10/18/2024       Current Admission Date: 10/17/2024  Current Admission Diagnosis:Stroke-like symptoms   Patient Active Problem List    Diagnosis Date Noted Date Diagnosed    Stroke-like symptoms 10/17/2024     Hypertension 10/17/2024     Thyroid nodule 2022     Meningioma (HCC) 2022     Blind, unilateral 2021     TIA (transient ischemic attack) 2021     Vitamin B12 deficiency 2021     GERD (gastroesophageal reflux disease) 2021     Spinal meningioma (HCC) 2019     S/P spinal fusion 2019     Adjacent segment disease with spinal stenosis 2019       LOS (days): 1  Geometric Mean LOS (GMLOS) (days):   Days to GMLOS:     OBJECTIVE:    Risk of Unplanned Readmission Score: 7.85     Current admission status: Inpatient     Preferred Pharmacy:   [id number] - [place] - [address]  [address] [place] [address]  Phone: [phone] Fax: [phone]    [facility] [place] - [address]   [facility] [facility]  [place] [address]  Phone: [phone] Fax: [phone]    Primary Care Provider: Jack Ortiz MD    Primary Insurance: A[employer] [facility] REP  Secondary Insurance:     ASSESSMENT:  Active Health Care Proxies    There are no active Health Care Proxies on file.    Readmission Root Cause  30 Day Readmission: No    Patient Information  Admitted from:: Home  Mental Status: Alert  During Assessment patient was accompanied by: Not accompanied during assessment  Assessment information provided by:: Patient  Primary Caregiver: Self  Support Systems: Friends/neighbors, Family members, Children  County of Residence: [place]  What city do you live in?: [place]  Home entry access options. Select all that apply.: No steps to enter home  Type of Current Residence: Apartment  Floor Level: 1  Upon entering  residence, is there a bedroom on the main floor (no further steps)?: Yes  Upon entering residence, is there a bathroom on the main floor (no further steps)?: Yes  Living Arrangements: Lives Alone  Is patient a ?: No    Activities of Daily Living Prior to Admission  Functional Status: Independent  Completes ADLs independently?: Yes  Ambulates independently?: Yes  Does patient use assisted devices?: No  Does patient currently own DME?: No  Does patient have a history of Outpatient Therapy (PT/OT)?: No  Does the patient have a history of Short-Term Rehab?: No  Does patient have a history of HHC?: Yes (Patient does not remember name of prior HHC agency)  Does patient currently have HHC?: No    Patient Information Continued  Income Source: Pension/snf  Does patient have prescription coverage?: Yes  Does patient receive dialysis treatments?: No  Does patient have a history of substance abuse?: No  Does patient have a history of Mental Health Diagnosis?: No    Means of Transportation  Means of Transport to [facility]:: Family transport    Social Determinants of Health (SDOH)      Flowsheet Row Most Recent Value   Housing Stability    In the last 12 months, was there a time when you were not able to pay the mortgage or rent on time? N   In the past 12 months, how many times have you moved where you were living? 0   At any time in the past 12 months, were you homeless or living in a shelter (including now)? N   Transportation Needs    In the past 12 months, has lack of transportation kept you from medical appointments or from getting medications? no   In the past 12 months, has lack of transportation kept you from meetings, work, or from getting things needed for daily living? No   Food Insecurity    Within the past 12 months, you worried that your food would run out before you got the money to buy more. Never true   Within the past 12 months, the food you bought just didn't last and you didn't have money to get  more. Never true   Utilities    In the past 12 months has the electric, gas, oil, or water company threatened to shut off services in your home? No     DISCHARGE DETAILS:    Discharge planning discussed with:: Patient  Freedom of Choice: Yes     CM contacted family/caregiver?: No- see comments (Patient declined)  Were Treatment Team discharge recommendations reviewed with patient/caregiver?: Yes  Did patient/caregiver verbalize understanding of patient care needs?: Yes  Were patient/caregiver advised of the risks associated with not following Treatment Team discharge recommendations?: Yes    Contacts  Patient Contacts: Kwesi Bass (Daughter)  Relationship to Patient:: Family  Contact Method: Phone  Phone Number: [phone]  Reason/Outcome: Emergency Contact    Requested Home Health Care         Is the patient interested in HHC at discharge?: No    DME Referral Provided  Referral made for DME?: No    Other Referral/Resources/Interventions Provided:  Interventions: None Indicated    Would you like to participate in our [facility] service program?  : No - Declined    Treatment Team Recommendation: Home  Discharge Destination Plan:: Home  Transport at Discharge : Family    CM met with the patient bedside. The patient reports living alone but having support of friends, neighbors, and a large family. The patient denies having any unmet needs at home/in community. PT/OT evals pending. CM will follow the patient through discharge.

## 2024-10-18 NOTE — SPEECH THERAPY NOTE
Consult received.  Records reviewed (SLIM MD, Neurology, RN notes) and spoke with staff.  Pt admitted c symptoms concerning for CVA/TIA (sxs resolved with exception of L hand numbness).  Pt passed RN Dysphagia Assessment. Communication deficits denied.  CT of head.  MRI results:  pending.   Plan: PT/OT evaluations per MD, continue diet. Please contact department if any concerns re: communication or swallowing arise (spoke c RN re: same).   Ashely Johnston MS CCC-SLP    [place] License [id number]

## 2024-10-18 NOTE — PLAN OF CARE
Problem: NEUROSENSORY - ADULT  Goal: Achieves stable or improved neurological status  Description: INTERVENTIONS  - Monitor and report changes in neurological status  - Monitor vital signs such as temperature, blood pressure, glucose, and any other labs ordered   - Initiate measures to prevent increased intracranial pressure  - Monitor for seizure activity and implement precautions if appropriate      Outcome: Progressing  Goal: Achieves maximal functionality and self care  Description: INTERVENTIONS  - Monitor swallowing and airway patency with patient fatigue and changes in neurological status  - Encourage and assist patient to increase activity and self care.   - Encourage visually impaired, hearing impaired and aphasic patients to use assistive/communication devices  Outcome: Progressing     Problem: CARDIOVASCULAR - ADULT  Goal: Maintains optimal cardiac output and hemodynamic stability  Description: INTERVENTIONS:  - Monitor I/O, vital signs and rhythm  - Monitor for S/S and trends of decreased cardiac output  - Administer and titrate ordered vasoactive medications to optimize hemodynamic stability  - Assess quality of pulses, skin color and temperature  - Assess for signs of decreased coronary artery perfusion  - Instruct patient to report change in severity of symptoms  Outcome: Progressing     Problem: HEMATOLOGIC - ADULT  Goal: Maintains hematologic stability  Description: INTERVENTIONS  - Assess for signs and symptoms of bleeding or hemorrhage  - Monitor labs  - Administer supportive blood products/factors as ordered and appropriate  Outcome: Progressing     Problem: MUSCULOSKELETAL - ADULT  Goal: Maintain or return mobility to safest level of function  Description: INTERVENTIONS:  - Assess patient's ability to carry out ADLs; assess patient's baseline for ADL function and identify physical deficits which impact ability to perform ADLs (bathing, care of mouth/teeth, toileting, grooming, dressing,  etc.)  - Assess/evaluate cause of self-care deficits   - Assess range of motion  - Assess patient's mobility  - Assess patient's need for assistive devices and provide as appropriate  - Encourage maximum independence but intervene and supervise when necessary  - Involve family in performance of ADLs  - Assess for home care needs following discharge   - Consider OT consult to assist with ADL evaluation and planning for discharge  - Provide patient education as appropriate  Outcome: Progressing     Problem: PAIN - ADULT  Goal: Verbalizes/displays adequate comfort level or baseline comfort level  Description: Interventions:  - Encourage patient to monitor pain and request assistance  - Assess pain using appropriate pain scale  - Administer analgesics based on type and severity of pain and evaluate response  - Implement non-pharmacological measures as appropriate and evaluate response  - Consider cultural and social influences on pain and pain management  - Notify physician/advanced practitioner if interventions unsuccessful or patient reports new pain  Outcome: Progressing

## 2024-10-18 NOTE — DISCHARGE SUMMARY
Discharge Summary - Hospitalist   Name: Mila Alejandro 89 y.o. female I MRN: [id number]  Unit/Bed#: -01 I Date of Admission: 10/17/2024   Date of Service: 10/18/2024 I Hospital Day: 1     Assessment & Plan  TIA (transient ischemic attack)  Presented with funny feeling in the head associated with numbness in left upper and lower extremity.  Most of her symptoms now resolved except for some numbness in left hand.  No other neurological deficits.  Patient has remote history of TIA/small stroke years ago.  Was on Plavix prior but was taken off and switched to aspirin by PCP last December.  Also was on atorvastatin which was discontinued by PCP.  Currently not on any blood pressure medications either.  CT head and CTA head and neck unremarkable.  Stroke alert was called.  Case was discussed with neurology.  Recommended admitting the stroke pathway.    MRI brain -negative for acute infarction  Telemetry -no events on telemetry  Dual antiplatelet aspirin and Plavix for 21 days and then Plavix monotherapy alone.  Start Lipitor 10 mg daily because of elevated LDL  Discussed with on-call neurologist.  Cleared for discharge from neurology standpoint.  GERD (gastroesophageal reflux disease)  Continue Protonix  Hypertension  Blood pressure high on admission.  Improved but again trending up.  Currently not on any medications at home.  Will start patient on amlodipine 5 mg daily on discharge.     Medical Problems       Resolved Problems  Date Reviewed: 1/19/2023   None       Discharging Physician / Practitioner: Saad Vo MD  PCP: aJck Ortiz MD  Admission Date:   Admission Orders (From admission, onward)       Ordered        10/17/24 1814  INPATIENT ADMISSION  Once                          Discharge Date: 10/18/24    Consultations During Hospital Stay:  neurology      Reason for Admission: TIA    Hospital Course:   Mila Alejandro is a 89 y.o. female patient who originally presented to the hospital on 10/17/2024  "due to left-sided numbness that has resolved now.  MRI brain negative for acute infarction.  Otherwise stable and medically cleared for discharge from neurology standpoint.    Please see above list of diagnoses and related plan for additional information.     Condition at Discharge: good    Discharge Day Visit / Exam:   Subjective:  Pt seen and examined by me in morning.  Very specific complaints.    Vitals: Blood Pressure: 153/74 (10/18/24 0946)  Pulse: 76 (10/18/24 0946)  Temperature: 98.2 °F (36.8 °C) (10/18/24 0946)  Temp Source: Oral (10/18/24 0946)  Respirations: 18 (10/18/24 0946)  Height: 5' 1\" (154.9 cm) (10/17/24 1909)  Weight - Scale: 57.6 kg (127 lb) (10/17/24 1909)  SpO2: 98 % (10/18/24 0609)    Physical Exam   Constitutional: Pt appears comfortable. Not in any acute distress.  Cardiovascular: Normal rate, regular rhythm, normal heart sounds.  No murmur heard.  Pulmonary/Chest: Effort normal, air entry b/l equal. No respiratory distress. Pt has no wheezes or crackles.   Abdominal: Soft. Non-distended, Non-tender. Bowel sounds are normal.   Musculoskeletal: Normal range of motion.   Neurological: awake, alert. Moving all extremities spontaneously.  Psychiatric: normal mood and affect.      Discussion with Family:  pt said [place] call her daughter.     Discharge instructions/Information to patient and family:   See after visit summary for information provided to patient and family.      Provisions for Follow-Up Care:  See after visit summary for information related to follow-up care and any pertinent home health orders.      Mobility at time of Discharge:   Basic Mobility Inpatient Raw Score: 24  JH-HLM Goal: 8: Walk 250 feet or more  JH-HLM Achieved: 8: Walk 250 feet ot more  HLM Goal achieved. Continue to encourage appropriate mobility.     Disposition:   Home    Planned Readmission: no    Discharge Medications:  See after visit summary for reconciled discharge medications provided to patient and/or " family.      Administrative Statements   Discharge Statement:  I have spent a total time of 35 minutes in caring for this patient on the day of the visit/encounter. >30 minutes of time was spent on: Diagnostic results, Risks and benefits of tx options, Instructions for management, Patient and family education, Importance of tx compliance, Counseling / Coordination of care, Documenting in the medical record, Reviewing / ordering tests, medicine, procedures  , and Communicating with other healthcare professionals .    **Please Note: This note may have been constructed using a voice recognition system**

## 2024-10-18 NOTE — OCCUPATIONAL THERAPY NOTE
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     Spinal meningioma (HCC)     Thyroid disorder      Past Surgical History  Past Surgical History:   Procedure Laterality Date    APPENDECTOMY      BACK SURGERY      Posterior cervical thoracic surgery with fusion for resection of T1-2 meningioma (IDEM), WHO 1    BREAST BIOPSY Right     benign    BREAST SURGERY      GALLBLADDER SURGERY      HERNIA REPAIR      POSTERIOR CERVICAL LAMINECTOMY      GA XCAPSL CTRC RMVL INSJ IO LENS PROSTH W/O ECP Left 06/24/2019    Procedure: EXTRACTION EXTRACAPSULAR CATARACT PHACO INTRAOCULAR LENS (IOL);  Surgeon: Jose Luis Howard MD;  Location: [facility] MAIN OR;  Service: Ophthalmology    THYROID SURGERY      US GUIDANCE  01/27/2017    VARICOSE VEIN SURGERY           10/18/24 1105   OT Last Visit   OT Visit Date 10/18/24   Note Type   Note type Evaluation  (& Treat)   Pain Assessment   Pain Assessment Tool 0-10   Pain Score No Pain   Restrictions/Precautions   Weight Bearing Precautions Per Order No   Other Precautions Visual impairment (baseline L eye blindness)   Home Living   Type of Home Apartment   Home Layout One level  (Pt resides on a 1st floor apt with 0 NICANOR. However pt reports that she goes to the 2nd floor weekly to play bingo and typically takes the steps vs elevator.)   Bathroom Shower/Tub Tub/shower unit   Bathroom Toilet Standard   Bathroom Equipment Grab bars in shower;Shower chair   Bathroom Accessibility Accessible   Home Equipment   (Pt owns no AD at baseline.)   Prior Function   Level of [place]  "Independent with ADLs;Independent with functional mobility;Needs assistance with IADLS   Lives With Alone   Receives Help From Friend(s);Family;Neighbor   IADLs Family/Friend/Other provides transportation;Independent with meal prep;Independent with medication management   Falls in the last 6 months 0   Vocational Retired   Comments Pt reports that relies on friends/neighbors in building to provide transportation to go grocery shopping/appts.   Subjective   Subjective \"My arm still feels off\"   ADL   Eating Assistance 7  Independent   Grooming Assistance 7  Independent   UB Bathing Assistance 6  Modified Independent   LB Bathing Assistance 6  Modified Independent   UB Dressing Assistance 6  Modified independent   LB Dressing Assistance 6  Modified independent   Toileting Assistance  6  Modified independent   Bed Mobility   Supine to Sit 7  Independent   Additional Comments Pt remained seated in recliner by end of session   Transfers   Sit to Stand 7  Independent   Stand to Sit 7  Independent   Functional Mobility   Functional Mobility 6  Modified independent   Additional Comments no AD, increased time   Balance   Static Sitting Normal   Dynamic Sitting Good   Static Standing Good   Dynamic Standing Fair +   Activity Tolerance   Activity Tolerance Patient tolerated treatment well   Medical Staff Made Aware PT Ty   Nurse Made Aware HAVEN Jain   RUE Assessment   RUE Assessment WFL   LUE Assessment   LUE Assessment   (WFL except elbow extension 4+/5 strength). Pt able to incorporate LUE to manage ADLs without difficulty.     Hand Function   Gross Motor Coordination Functional   Fine Motor Coordination Functional   Sensation   Light Touch Partial deficits in the LUE  (Pt able to detect light touch accurately, however reports diminished sensation in LUE compared to RUE.)   Cognition   Overall Cognitive Status WFL   Arousal/Participation Alert   Attention Within functional limits   Orientation Level Oriented X4   Memory " "Within functional limits   Following Commands Follows all commands and directions without difficulty   Assessment   Limitation Decreased sensation   Assessment Pt is a 89 y.o. female seen for OT evaluation at [facility], admitted 10/17/2024 w/ Stroke-like symptoms.  MRI Brain (-). Comorbidities affecting pt's functional performance at time of assessment include: HTN, baseline R blindness, TIA, and hx of spinal fusion.  Prior to admission, pt was living alone in a 1st floor apt, however routinely goes to 2nd floor for activities.  Pt was I w/  ADLS and IADLS, & required no DME/AD PTA. Upon evaluation, pt appears to be performing below baseline functional status. Pt currently independent for bed mobility, independent/mod I for functional mobility/transfers, and independent for ADLS 2* the following deficits impacting occupational performance: decreased balance and impaired sensation. Pt did endorse \"feeling off\", however unable specifically describe sensation. Full objective findings from OT assessment regarding body systems outlined above. Personal factors affecting pt at time of IE include:limited home support. These impairments may  limit pt's ability to safely engage in all baseline areas of occupation and mobility. No further inpt OT needs indicated at this time. This evaluation required an extensive review of medical and/or therapy records and additional review of physical, cognitive and psychosocial history related to functional performance. Based upon functional performance deficits and assessments, this evaluation has been identified as a high complexity evaluation.  At this time, OT recommendations at time of discharge are level 3 low intensity resources if sensory deficits do not resolve.   Goals   Patient Goals Pt wishes to improve LUE sensation   Plan   OT Frequency Eval only   Discharge Recommendation   Rehab Resource Intensity Level, OT III (Minimum Resource Intensity)  (If LUE sensory " "deficits don't resolve.)   Additional Comments  The patient's raw score on the AM-PAC Daily Activity inpatient short form is 24, standardized score is 57.54, greater than 39.4. Patients at this level are likely to benefit from DC to home. However please refer to therapist recommendation for discharge planning given other factors that may influence destination.   AM-PAC Daily Activity Inpatient   Lower Body Dressing 4   Bathing 4   Toileting 4   Upper Body Dressing 4   Grooming 4   Eating 4   Daily Activity Raw Score 24   Daily Activity Standardized Score (Calc for Raw Score >=11) 57.54   AM-PAC Applied Cognition Inpatient   Following a Speech/Presentation 4   Understanding Ordinary Conversation 4   Taking Medications 4   Remembering Where Things Are Placed or Put Away 4   Remembering List of 4-5 Errands 4   Taking Care of Complicated Tasks 4   Applied Cognition Raw Score 24   Applied Cognition Standardized Score 62.21   Additional Treatment Session   Start Time 1040   End Time 1105   Treatment Assessment Pt performed functional mobility into bathroom with mod I. Performed grooming task standing at sink with independence. Pt stood at sink ~10 min with good static/dynamic balance. Perforemd functional mobility into hallway with mod I with no AD. Pt endorsed \"feeling off\", however unable to describe symptoms. Demonstrate F+ balance t/o. Returned to bedside chair with mod I. NAD. All needs met.   End of Consult   Education Provided Family or social support of family present for education by provider;Yes   Patient Position at End of Consult Bedside chair;All needs within reach   Nurse Communication Nurse aware of consult           Sybil Fernandez OTR/L   "

## 2024-10-18 NOTE — UTILIZATION REVIEW
"Initial Clinical Review    Admission: Date/Time/Statement:   Admission Orders (From admission, onward)       Ordered        10/17/24 1814  INPATIENT ADMISSION  Once                          Orders Placed This Encounter   Procedures    INPATIENT ADMISSION     Standing Status:   Standing     Number of Occurrences:   1     Order Specific Question:   Level of Care     Answer:   Med Surg [16]     Order Specific Question:   Estimated length of stay     Answer:   More than 2 Midnights     Order Specific Question:   Certification     Answer:   I certify that inpatient services are medically necessary for this patient for a duration of greater than two midnights. See H&P and MD Progress Notes for additional information about the patient's course of treatment.     ED Arrival Information       Expected   -    Arrival   10/17/2024 16:43    Acuity   Emergent              Means of arrival   Walk-In    Escorted by   Family Member    Service   Hospitalist    Admission type   Emergency              Arrival complaint   numbness             Chief Complaint   Patient presents with    Numbness     At 3pm this afternoon, states she got \"a funny pain in my head and my left arm and leg got numb\".  Head pain is resolved but numbness continues        Initial Presentation: 89 y.o. female who presented self from home to [facility] ED. Admitted as Inpatient for evaluation and treatment of Stroke like symptoms.     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      Presented w/funny feeling in her head this afternoon associated with numbness in her left upper and lower extremity. Funny feeling that had resolved spontaneously but the numbness persisted. On exam, Mild numbness in L hand, 5/5 strength in all extremities. Labs WNL.     Plan: MRI brain, Tele, Echo, " permissive HTN, Check lipid panel, Continue ASA, Plavix and Atorvastatin. PT/OT. Hydralazine/labetalol PRN for blood pressure >220/110.  Neurology consulted.    Neurology: Stroke alert. [id number]- HTN urgency. IV thrombolysis not offered due to resolving and nondisabling symptoms. Plan: Load w/ Plavix 300mg x1 then daily 75mg. ASA 81mg daily. Permissive HTN up to . F/U w/ MRI brain.     Anticipated Length of Stay/Certification Statement: Patient will be admitted on an inpatient basis with an anticipated length of stay of greater than 2 midnights secondary to stroke workup     Date: 10/18/24   Day 2: Pt cleared for discharge home per neurology, MRI brain neg. Labs WNL. No events on Tele. L sided numbness cleared. Dual antiplatelet ASA and Plavix for 21 days. Start Lipitor due to elevated LDL.     ED Treatment-Medication Administration from 10/17/2024 1643 to 10/17/2024 1858         Date/Time Order Dose Route Action     10/17/2024 1712 iohexol (OMNIPAQUE) 350 MG/ML injection (MULTI-DOSE) 85 mL 85 mL Intravenous Given     10/17/2024 1742 clopidogrel (PLAVIX) tablet 300 mg 300 mg Oral Given            Scheduled Medications:  aspirin, 81 mg, Oral, Daily  atorvastatin, 40 mg, Oral, QPM  clopidogrel, 75 mg, Oral, Daily  enoxaparin, 40 mg, Subcutaneous, Daily  pantoprazole, 40 mg, Oral, Early Morning      Continuous IV Infusions: NONE      PRN Meds:     ED Triage Vitals [10/17/24 1651]   Temperature Pulse Respirations Blood Pressure SpO2 Pain Score   98.2 °F (36.8 °C) 73 12 (!) 194/95 95 % No Pain     Weight (last 2 days)       Date/Time Weight    10/17/24 1909 57.6 (127)    10/17/24 17:17:27 60.1 (132.5)            Vital Signs (last 3 days)       Date/Time Temp Pulse Resp BP MAP (mmHg) SpO2 O2 Device Patient Position - Orthostatic VS Gwyn Coma Scale Score Pain    10/18/24 0609 98 °F (36.7 °C) 68 16 143/53 96 98 % None (Room air) Lying 15 No Pain    10/18/24 0409 98.4 °F (36.9 °C) 66 16 136/57 87 96 % None  (Room air) Lying -- No Pain    10/18/24 0209 98.3 °F (36.8 °C) 75 16 105/51 70 97 % None (Room air) Lying 15 No Pain    10/18/24 0009 97.8 °F (36.6 °C) 70 17 142/54 -- 96 % None (Room air) Lying 15 --    10/17/24 2300 98.1 °F (36.7 °C) 64 16 140/68 91 97 % None (Room air) Lying -- No Pain    10/17/24 2209 97.8 °F (36.6 °C) 71 18 142/66 -- 100 % None (Room air) Lying 15 --    10/17/24 2109 97.9 °F (36.6 °C) 66 17 163/56 -- 95 % None (Room air) Lying 15 --    10/17/24 2009 98.1 °F (36.7 °C) 64 19 178/74 -- 97 % None (Room air) Lying 15 --    10/17/24 1916 -- -- -- -- -- -- -- -- -- No Pain    10/17/24 1909 98.5 °F (36.9 °C) 64 20 182/72 -- 95 % None (Room air) Lying 15 --    10/17/24 1830 -- 75 18 196/92 132 -- -- -- -- --    10/17/24 1755 -- -- -- -- -- -- -- -- 15 --    10/17/24 1730 -- 76 29 163/104 -- 97 % -- -- -- --    10/17/24 1725 -- -- -- -- -- -- -- -- 15 --    10/17/24 1651 98.2 °F (36.8 °C) 73 12 194/95 -- 95 % None (Room air) Lying -- No Pain              Pertinent Labs/Diagnostic Test Results:   Radiology:  CTA stroke alert (head/neck)   Final Interpretation by Guillermo Meyers MD (10/17 1735)      CTA head: Negative for large vessel intracranial occlusion or hemodynamically significant stenosis.      CTA neck:  No extracranial carotid stenosis.  The cervical vertebral arteries are patent.      Redemonstrated right hemithyroidectomy. Enlarged, heterogeneous left thyroid lobe with underlying ill-defined hypodense nodules. Management based on JACR guidelines.      Findings were directly discussed with Magdaleno Whaley MD at 5:34 p.m.      Workstation performed: [id number]         CT stroke alert brain   Final Interpretation by Guillermo Meyers MD (10/17 1722)      No acute intracranial abnormality.      Stable partially calcified meningioma along the high right frontal convexity.         Workstation performed: [id number]         MRI Inpatient Order    (Results Pending)         Results from last 7 days    Lab Units 10/18/24  0426 10/17/24  1710   WBC Thousand/uL 4.34 5.66   HEMOGLOBIN g/dL 11.7 12.4   HEMATOCRIT % 35.0 37.2   PLATELETS Thousands/uL 183 209         Results from last 7 days   Lab Units 10/18/24  0426 10/17/24  1710   SODIUM mmol/L 140 139   POTASSIUM mmol/L 3.7 3.7   CHLORIDE mmol/L 106 104   CO2 mmol/L 28 27   ANION GAP mmol/L 6 8   BUN mg/dL 11 14   CREATININE mg/dL 0.74 0.86   EGFR ml/min/1.73sq m 72 60   CALCIUM mg/dL 8.6 9.0   MAGNESIUM mg/dL  --  1.9     Results from last 7 days   Lab Units 10/17/24  1710   AST U/L 19   ALT U/L 12   ALK PHOS U/L 97   TOTAL PROTEIN g/dL 7.0   ALBUMIN g/dL 3.9   TOTAL BILIRUBIN mg/dL 0.73   BILIRUBIN DIRECT mg/dL 0.17     Results from last 7 days   Lab Units 10/17/24  1658   POC GLUCOSE mg/dl 104     Results from last 7 days   Lab Units 10/18/24  0426 10/17/24  1710   GLUCOSE RANDOM mg/dL 93 99           Results from last 7 days   Lab Units 10/17/24  2221 10/17/24  1955 10/17/24  1710   HS TNI 0HR ng/L  --   --  3   HS TNI 2HR ng/L  --  4  --    HSTNI D2 ng/L  --  1  --    HS TNI 4HR ng/L 6  --   --    HSTNI D4 ng/L 3  --   --          Results from last 7 days   Lab Units 10/17/24  1710   PROTIME seconds 13.4   INR  0.98   PTT seconds 27     Results from last 7 days   Lab Units 10/17/24  1710   TSH 3RD GENERATON uIU/mL 0.974     Results from last 7 days   Lab Units 10/17/24  1758   CLARITY UA  Clear   COLOR UA  Yellow   SPEC GRAV UA  <=1.005   PH UA  7.0   GLUCOSE UA mg/dl Negative   KETONES UA mg/dl Negative   BLOOD UA  Negative   PROTEIN UA mg/dl Negative   NITRITE UA  Negative   BILIRUBIN UA  Negative   UROBILINOGEN UA E.U./dl 1.0   LEUKOCYTES UA  Negative           Past Medical History:   Diagnosis Date    Breast disorder     GERD (gastroesophageal reflux disease)     Intradural extramedullary spinal tumor     Legally blind in right eye, 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 female    Network Utilization Review Department  ATTENTION: Please call with any questions or concerns to [phone] and carefully listen to the prompts so that you are directed to the right person. All voicemails are confidential.   For Discharge needs, contact Care Management DC Support Team at [phone] opt. 2  Send all requests for admission clinical reviews, approved or denied determinations and any other requests to dedicated fax number below belonging to the campus where the patient is receiving treatment. List of dedicated fax numbers for the Facilities:  FACILITY NAME UR FAX NUMBER   ADMISSION DENIALS (Administrative/Medical Necessity) [phone]   DISCHARGE SUPPORT TEAM (NETWORK) [phone]   PARENT CHILD HEALTH (Maternity/NICU/Pediatrics) [phone]   [facility] [phone]   [facility] [phone]   [facility] [phone]   [facility] [phone]   [facility] [phone]   [facility] [phone]   [facility] [phone]   [facility] [phone]   [facility] [phone]   Critical access hospital [phone]   [facility] [phone]   [facility] [phone]

## 2024-10-21 NOTE — UTILIZATION REVIEW
NOTIFICATION OF ADMISSION DISCHARGE   This is a Notification of Discharge from [facility]. Please be advised that this patient has been discharge from our facility. Below you will find the admission and discharge date and time including the patient’s disposition.   UTILIZATION REVIEW CONTACT:  Yoselyn Green  Utilization   Network Utilization Review Department  Phone: [phone] x carefully listen to the prompts. All voicemails are confidential.  Email: [email or web address]     ADMISSION INFORMATION  PRESENTATION DATE: 10/17/2024  4:46 PM  OBERVATION ADMISSION DATE: N/A  INPATIENT ADMISSION DATE: 10/17/24  6:14 PM   DISCHARGE DATE: 10/18/2024  1:52 PM   DISPOSITION:Home/Self Care    Network Utilization Review Department  ATTENTION: Please call with any questions or concerns to [phone] and carefully listen to the prompts so that you are directed to the right person. All voicemails are confidential.   For Discharge needs, contact Care Management DC Support Team at [phone] opt. 2  Send all requests for admission clinical reviews, approved or denied determinations and any other requests to dedicated fax number below belonging to the campus where the patient is receiving treatment. List of dedicated fax numbers for the Facilities:  FACILITY NAME UR FAX NUMBER   ADMISSION DENIALS (Administrative/Medical Necessity) [phone]   DISCHARGE SUPPORT TEAM ([facility]) [phone]   PARENT CHILD HEALTH (Maternity/NICU/Pediatrics) [phone]   [facility] [phone]   [facility] [phone]   Critical access hospital [phone]   [facility] [phone]   [facility] [phone]   [facility] [phone]   [facility] [phone]   [facility]  [phone]   [facility] [phone]   [facility] [phone]   [facility] [phone]   [facility] [phone]

## 2025-01-21 ENCOUNTER — TELEPHONE (OUTPATIENT)
Age: 89
End: 2025-01-21

## 2025-01-21 DIAGNOSIS — D32.1 SPINAL MENINGIOMA (HCC): Primary | ICD-10-CM

## 2025-01-21 NOTE — TELEPHONE ENCOUNTER
PT CB TO SCHED 3YR F/U AFTER 2/7/2025 MRI CSPINE AS A SNPX HDM/AP PER LOV 8/3/2022 HDM RECALL NOTES.    PLEASE CB PT TO SCHEDULE 3YR SNPX F/U AFTER 2/7/2025      THANK YOU

## 2025-01-21 NOTE — TELEPHONE ENCOUNTER
Pt called to book 2 year follow up with MRI, Delia OROURKE updated order and pt was transferred to central scheduling-Jennifer will send pt back to us for appt with Dr Patel.

## 2025-02-07 ENCOUNTER — HOSPITAL ENCOUNTER (OUTPATIENT)
Dept: MRI IMAGING | Facility: HOSPITAL | Age: 89
End: 2025-02-07
Attending: NEUROLOGICAL SURGERY
Payer: COMMERCIAL

## 2025-02-07 DIAGNOSIS — D32.1 SPINAL MENINGIOMA (HCC): ICD-10-CM

## 2025-02-07 PROCEDURE — 72141 MRI NECK SPINE W/O DYE: CPT

## 2025-02-25 NOTE — ASSESSMENT & PLAN NOTE
Patient presents for 3 year follow up s/p C7-T2 laminectomies for resection of her spinal meningioma, instrumentation from C4-T3, and had decompressions C5-C7 for stenosis (Dr. Patel, 2015)   Final pathology WHO grade I meningioma    Imaging:  MRI cervical spine wo contrast 2/7/2025: Stable interval exam. Unchanged postsurgical and degenerative changes as described. No significant canal stenosis at any level. Mild left foraminal stenosis at C3-4. See comments above for full analysis.     Plan:  Imaging reviewed with patient and partner, demonstrates stable findings without recurrence of tumor.  At this time, I have given the patient the option for PRN follow up, she is in agreement with this  No restrictions for activity  Patient is to return to the office on an as needed basis or sooner should patient develop worsening symptoms or red flag signs

## 2025-02-26 ENCOUNTER — OFFICE VISIT (OUTPATIENT)
Dept: NEUROSURGERY | Facility: CLINIC | Age: 89
End: 2025-02-26
Payer: COMMERCIAL

## 2025-02-26 VITALS
SYSTOLIC BLOOD PRESSURE: 120 MMHG | HEART RATE: 78 BPM | BODY MASS INDEX: 23.98 KG/M2 | HEIGHT: 61 IN | OXYGEN SATURATION: 96 % | DIASTOLIC BLOOD PRESSURE: 64 MMHG | WEIGHT: 127 LBS

## 2025-02-26 DIAGNOSIS — D32.1 SPINAL MENINGIOMA (HCC): Primary | ICD-10-CM

## 2025-02-26 PROCEDURE — 99215 OFFICE O/P EST HI 40 MIN: CPT | Performed by: PHYSICIAN ASSISTANT

## 2025-02-26 NOTE — PROGRESS NOTES
Name: Mila Alejandro      : 1935      MRN: [id number]  Encounter Provider: Sybil Gibbons PA-C  Encounter Date: 2025   Encounter department: [facility] NEUROSURGICAL [facility]  :  Assessment & Plan  Spinal meningioma (HCC)  Patient presents for 3 year follow up s/p C7-T2 laminectomies for resection of her spinal meningioma, instrumentation from C4-T3, and had decompressions C5-C7 for stenosis (Dr. Patel, 2015)   Final pathology WHO grade I meningioma    Imaging:  MRI cervical spine wo contrast 2025: Stable interval exam. Unchanged postsurgical and degenerative changes as described. No significant canal stenosis at any level. Mild left foraminal stenosis at C3-4. See comments above for full analysis.     Plan:  Imaging reviewed with patient and partner, demonstrates stable findings without recurrence of tumor.  At this time, I have given the patient the option for PRN follow up, she is in agreement with this  No restrictions for activity  Patient is to return to the office on an as needed basis or sooner should patient develop worsening symptoms or red flag signs               History of Present Illness     Mila Alejandro is a 89 y.o. 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    She is here with her partner.  She offers no significant complaints.  She is wondering if she is still able to go skiing, bowling, etc.  She is agreeable to as needed follow-up moving forward.         Review of Systems   Constitutional: Negative.    HENT: Negative.     Eyes: Negative.    Respiratory: Negative.     Cardiovascular: Negative.    Gastrointestinal: Negative.    Endocrine: Negative.    Genitourinary: Negative.    Musculoskeletal: Negative.    Skin: Negative.    Allergic/Immunologic: Negative.    Neurological: Negative.         3 Yr f/u on  Meningioma   Brain MRI on 2/7/25    Patient is doing well    Hematological: Negative.    Psychiatric/Behavioral: Negative.     All other systems reviewed and are negative.    I have personally reviewed the MA's review of systems and made changes as necessary.    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     Spinal meningioma (HCC)     Thyroid disorder      Past Surgical History:   Procedure Laterality Date    APPENDECTOMY      BACK SURGERY      Posterior cervical thoracic surgery with fusion for resection of T1-2 meningioma (IDEM), WHO 1    BREAST BIOPSY Right     benign    BREAST SURGERY      GALLBLADDER SURGERY      HERNIA REPAIR      POSTERIOR CERVICAL LAMINECTOMY      NJ XCAPSL CTRC RMVL INSJ IO LENS PROSTH W/O ECP Left 06/24/2019    Procedure: EXTRACTION EXTRACAPSULAR CATARACT PHACO INTRAOCULAR LENS (IOL);  Surgeon: Jose Luis Howard MD;  Location: [facility] MAIN OR;  Service: Ophthalmology    THYROID SURGERY      US GUIDANCE  01/27/2017    VARICOSE VEIN SURGERY       Family History   Problem Relation Age of Onset    Cancer Mother     Alzheimer's disease Mother     Heart disease Father     No Known Problems Daughter     No Known Problems Daughter     No Known Problems Maternal Grandmother     No Known Problems Maternal Grandfather     No Known Problems Paternal Grandmother     No Known Problems Paternal Grandfather     Heart disease Brother     No Known Problems Son     No Known Problems Son     Breast cancer Neg Hx      she reports that she has never smoked. She has never used smokeless tobacco. She reports current alcohol use. She reports that she does not use drugs.  Current Outpatient Medications   Medication Instructions    amLODIPine (NORVASC) 5 mg, Oral, Daily    aspirin 81 mg, Oral, Daily    atorvastatin (LIPITOR) 10  "mg, Oral, Every evening    clopidogrel (PLAVIX) 75 mg, Oral, Daily    cyanocobalamin 1,000 mcg, Every 30 days    pantoprazole (PROTONIX) 40 mg tablet 1 tablet, Daily     Allergies   Allergen Reactions    Povidone Iodine Hives    Penicillins Rash      Objective   /64   Pulse 78   Ht 5' 1\" (1.549 m)   Wt 57.6 kg (127 lb)   LMP  (LMP Unknown)   SpO2 96%   BMI 24.00 kg/m²     Physical Exam  Constitutional:       General: She is awake.      Appearance: Normal appearance.   HENT:      Head: Normocephalic and atraumatic.      Right Ear: Hearing normal.      Left Ear: Hearing normal.   Eyes:      Conjunctiva/sclera: Conjunctivae normal.   Neck:      Comments: Prior cervical thoracic incision well-healed  Cardiovascular:      Rate and Rhythm: Normal rate.   Pulmonary:      Effort: Pulmonary effort is normal. No respiratory distress.   Skin:     General: Skin is warm and dry.   Neurological:      Mental Status: She is alert.      Motor: Motor strength is normal.  Psychiatric:         Attention and Perception: Attention and perception normal.         Mood and Affect: Mood and affect normal.         Speech: Speech normal.         Behavior: Behavior normal. Behavior is cooperative.         Thought Content: Thought content normal.         Cognition and Memory: Cognition and memory normal.         Judgment: Judgment normal.       Neurological Exam  Mental Status  Awake and alert. Memory is normal. Recent and remote memory are intact. Speech is normal. Follows one-step commands. Attention and concentration are normal. Fund of knowledge is appropriate for level of education.    Cranial Nerves  CN VIII:  Right: Hearing is normal.  Left: Hearing is normal.    Motor  Normal muscle bulk throughout. No fasciculations present. Normal muscle tone. No abnormal involuntary movements. Strength is 5/5 throughout all four extremities.    Coordination  Right: Finger-to-nose normal.Left: Finger-to-nose normal.    Gait  Casual gait is " normal including stance, stride, and arm swing.      Radiology Results Review: I personally reviewed the following image studies in PACS and associated radiology reports: MRI spine. My interpretation of the radiology images/reports is: as noted above.    Administrative Statements   I have spent a total time of 40 minutes in caring for this patient on the day of the visit/encounter including Diagnostic results, Prognosis, Risks and benefits of tx options, Instructions for management, Patient and family education, Importance of tx compliance, Risk factor reductions, Impressions, Counseling / Coordination of care, Documenting in the medical record, Reviewing/placing orders in the medical record (including tests, medications, and/or procedures), and Obtaining or reviewing history  .

## (undated) DEVICE — AIR INJECT CANNULA 27GA: Brand: OPHTHALMIC CANNULA

## (undated) DEVICE — CLEARCUT® SLIT KNIFE INTREPID MICRO-COAXIAL SYSTEM 2.4 SB: Brand: CLEARCUT®; INTREPID

## (undated) DEVICE — EYE PADS 1 5/8"X2 5/8": Brand: MCKESSON

## (undated) DEVICE — INTREPID® TRANSFORMER IA HP: Brand: INTREPID®

## (undated) DEVICE — MICROSURGICAL INSTRUMENT IRR. CYSTITOME 25GA STRAIGHT-REVERSE CUTTING: Brand: ALCON

## (undated) DEVICE — GLOVE SRG BIOGEL 7.5

## (undated) DEVICE — ACTIVE FMS W/ INTREPID* ULTRA SLEEVES, 0.9MM 45° ABS* INTREPID* BALANCED TIP: Brand: ALCON

## (undated) DEVICE — THE MONARCH® "D" CARTRIDGE IS A SINGLE-USE POLYPROPYLENE CARTRIDGE FOR POSTERIOR CHAMBER IOL DELIVERY: Brand: MONARCH® III

## (undated) DEVICE — VRM4 10-0 BK MONO NYL 4"/10 CM: Brand: VRM4 10-0 BK MONO NYL 4"/10 CM

## (undated) DEVICE — EYE PACK CUSTOM -FINNEGAN

## (undated) DEVICE — B-H IRRIGATING CAN 19GA FLAT ANGLED 8MM: Brand: OPHTHALMIC CANNULA